# Patient Record
Sex: FEMALE | Race: WHITE | NOT HISPANIC OR LATINO | Employment: OTHER | URBAN - METROPOLITAN AREA
[De-identification: names, ages, dates, MRNs, and addresses within clinical notes are randomized per-mention and may not be internally consistent; named-entity substitution may affect disease eponyms.]

---

## 2023-02-22 LAB
ALANINE AMINOTRANSFERASE (SGPT) (U/L) IN SER/PLAS: 16 U/L (ref 7–45)
ALBUMIN (G/DL) IN SER/PLAS: 4.3 G/DL (ref 3.4–5)
ALKALINE PHOSPHATASE (U/L) IN SER/PLAS: 61 U/L (ref 33–136)
ANION GAP IN SER/PLAS: 12 MMOL/L (ref 10–20)
ASPARTATE AMINOTRANSFERASE (SGOT) (U/L) IN SER/PLAS: 19 U/L (ref 9–39)
BILIRUBIN TOTAL (MG/DL) IN SER/PLAS: 0.5 MG/DL (ref 0–1.2)
CALCIDIOL (25 OH VITAMIN D3) (NG/ML) IN SER/PLAS: 34 NG/ML
CALCIUM (MG/DL) IN SER/PLAS: 9.7 MG/DL (ref 8.6–10.3)
CARBON DIOXIDE, TOTAL (MMOL/L) IN SER/PLAS: 29 MMOL/L (ref 21–32)
CHLORIDE (MMOL/L) IN SER/PLAS: 102 MMOL/L (ref 98–107)
CHOLESTEROL (MG/DL) IN SER/PLAS: 139 MG/DL (ref 0–199)
CHOLESTEROL IN HDL (MG/DL) IN SER/PLAS: 53.8 MG/DL
CHOLESTEROL/HDL RATIO: 2.6
CREATININE (MG/DL) IN SER/PLAS: 0.81 MG/DL (ref 0.5–1.05)
CREATININE (MG/DL) IN URINE: 72 MG/DL (ref 20–320)
ERYTHROCYTE DISTRIBUTION WIDTH (RATIO) BY AUTOMATED COUNT: 12.9 % (ref 11.5–14.5)
ERYTHROCYTE MEAN CORPUSCULAR HEMOGLOBIN CONCENTRATION (G/DL) BY AUTOMATED: 32.1 G/DL (ref 32–36)
ERYTHROCYTE MEAN CORPUSCULAR VOLUME (FL) BY AUTOMATED COUNT: 87 FL (ref 80–100)
ERYTHROCYTES (10*6/UL) IN BLOOD BY AUTOMATED COUNT: 4.46 X10E12/L (ref 4–5.2)
ESTIMATED AVERAGE GLUCOSE FOR HBA1C: 189 MG/DL
GFR FEMALE: 75 ML/MIN/1.73M2
GLUCOSE (MG/DL) IN SER/PLAS: 165 MG/DL (ref 74–99)
HEMATOCRIT (%) IN BLOOD BY AUTOMATED COUNT: 39 % (ref 36–46)
HEMOGLOBIN (G/DL) IN BLOOD: 12.5 G/DL (ref 12–16)
HEMOGLOBIN A1C/HEMOGLOBIN TOTAL IN BLOOD: 8.2 %
LDL: 44 MG/DL (ref 0–99)
LEUKOCYTES (10*3/UL) IN BLOOD BY AUTOMATED COUNT: 7.2 X10E9/L (ref 4.4–11.3)
NON HDL CHOLESTEROL: 85 MG/DL
PLATELETS (10*3/UL) IN BLOOD AUTOMATED COUNT: 289 X10E9/L (ref 150–450)
POTASSIUM (MMOL/L) IN SER/PLAS: 4.3 MMOL/L (ref 3.5–5.3)
PROTEIN (MG/DL) IN URINE: 20 MG/DL (ref 5–24)
PROTEIN TOTAL: 6.6 G/DL (ref 6.4–8.2)
PROTEIN/CREATININE (MG/MG) IN URINE: 0.28 MG/MG CREAT (ref 0–0.17)
SODIUM (MMOL/L) IN SER/PLAS: 139 MMOL/L (ref 136–145)
TRIGLYCERIDE (MG/DL) IN SER/PLAS: 204 MG/DL (ref 0–149)
UREA NITROGEN (MG/DL) IN SER/PLAS: 20 MG/DL (ref 6–23)
VLDL: 41 MG/DL (ref 0–40)

## 2023-04-04 PROBLEM — M67.951 TENDINOPATHY OF RIGHT GLUTEUS MEDIUS: Status: ACTIVE | Noted: 2023-04-04

## 2023-04-04 PROBLEM — M81.0 OSTEOPOROSIS, POST-MENOPAUSAL: Status: ACTIVE | Noted: 2023-04-04

## 2023-04-04 PROBLEM — J22 LOWER RESPIRATORY INFECTION (E.G., BRONCHITIS, PNEUMONIA, PNEUMONITIS, PULMONITIS): Status: ACTIVE | Noted: 2023-04-04

## 2023-04-04 PROBLEM — R10.32 ABDOMINAL PAIN, ACUTE, LEFT LOWER QUADRANT: Status: RESOLVED | Noted: 2023-04-04 | Resolved: 2023-04-04

## 2023-04-04 PROBLEM — H52.13 MYOPIA OF BOTH EYES: Status: ACTIVE | Noted: 2023-04-04

## 2023-04-04 PROBLEM — R42 VERTIGO: Status: ACTIVE | Noted: 2023-04-04

## 2023-04-04 PROBLEM — M54.50 LOW BACK PAIN: Status: ACTIVE | Noted: 2023-04-04

## 2023-04-04 PROBLEM — M17.0 OSTEOARTHRITIS OF BOTH KNEES: Status: ACTIVE | Noted: 2023-04-04

## 2023-04-04 PROBLEM — M25.532 LEFT WRIST PAIN: Status: ACTIVE | Noted: 2023-04-04

## 2023-04-04 PROBLEM — M65.342 TRIGGER RING FINGER OF LEFT HAND: Status: ACTIVE | Noted: 2023-04-04

## 2023-04-04 PROBLEM — H93.12 TINNITUS OF LEFT EAR: Status: ACTIVE | Noted: 2023-04-04

## 2023-04-04 PROBLEM — R35.0 INCREASED URINARY FREQUENCY: Status: ACTIVE | Noted: 2023-04-04

## 2023-04-04 PROBLEM — S89.91XA INJURY OF RIGHT KNEE: Status: ACTIVE | Noted: 2023-04-04

## 2023-04-04 PROBLEM — M65.839 INTERSECTION SYNDROME OF WRIST: Status: ACTIVE | Noted: 2023-04-04

## 2023-04-04 PROBLEM — M19.042 OSTEOARTHRITIS OF HANDS, BILATERAL: Status: ACTIVE | Noted: 2023-04-04

## 2023-04-04 PROBLEM — M47.816 SPONDYLOSIS OF LUMBAR SPINE: Status: ACTIVE | Noted: 2023-04-04

## 2023-04-04 PROBLEM — M15.9 GENERALIZED OSTEOARTHRITIS OF MULTIPLE SITES: Status: ACTIVE | Noted: 2023-04-04

## 2023-04-04 PROBLEM — M19.041 OSTEOARTHRITIS OF HANDS, BILATERAL: Status: ACTIVE | Noted: 2023-04-04

## 2023-04-04 PROBLEM — G56.00 CARPAL TUNNEL SYNDROME: Status: ACTIVE | Noted: 2023-04-04

## 2023-04-04 PROBLEM — N95.2 ATROPHIC VAGINITIS: Status: ACTIVE | Noted: 2023-04-04

## 2023-04-04 PROBLEM — I10 HYPERTENSION: Status: ACTIVE | Noted: 2023-04-04

## 2023-04-04 PROBLEM — J32.9 SINUSITIS: Status: RESOLVED | Noted: 2023-04-04 | Resolved: 2023-04-04

## 2023-04-04 PROBLEM — K11.8 MASS OF RIGHT PAROTID GLAND: Status: ACTIVE | Noted: 2023-04-04

## 2023-04-04 PROBLEM — E55.9 VITAMIN D DEFICIENCY: Status: ACTIVE | Noted: 2023-04-04

## 2023-04-04 PROBLEM — H81.10 BENIGN PAROXYSMAL POSITIONAL VERTIGO: Status: ACTIVE | Noted: 2023-04-04

## 2023-04-04 PROBLEM — E78.2 HYPERLIPIDEMIA, MIXED: Status: ACTIVE | Noted: 2023-04-04

## 2023-04-04 PROBLEM — M46.1 SACROILIITIS (CMS-HCC): Status: ACTIVE | Noted: 2023-04-04

## 2023-04-04 PROBLEM — N39.0 RECURRENT UTI: Status: RESOLVED | Noted: 2023-04-04 | Resolved: 2023-04-04

## 2023-04-04 PROBLEM — H69.93 EUSTACHIAN TUBE DYSFUNCTION, BILATERAL: Status: ACTIVE | Noted: 2023-04-04

## 2023-04-04 PROBLEM — H90.3 BILATERAL SENSORINEURAL HEARING LOSS: Status: ACTIVE | Noted: 2023-04-04

## 2023-04-04 PROBLEM — D31.31 CHOROIDAL NEVUS OF RIGHT EYE: Status: ACTIVE | Noted: 2023-04-04

## 2023-04-04 PROBLEM — R59.1 LYMPHADENOPATHY: Status: ACTIVE | Noted: 2023-04-04

## 2023-04-04 PROBLEM — E11.9 TYPE 2 DIABETES MELLITUS WITHOUT COMPLICATIONS (MULTI): Status: ACTIVE | Noted: 2023-04-04

## 2023-04-04 PROBLEM — R25.2 LEG CRAMPS: Status: ACTIVE | Noted: 2023-04-04

## 2023-04-04 PROBLEM — H52.03 HYPERMETROPIA OF BOTH EYES: Status: ACTIVE | Noted: 2023-04-04

## 2023-04-04 PROBLEM — M25.542 ARTHRALGIA OF LEFT HAND: Status: ACTIVE | Noted: 2023-04-04

## 2023-04-04 PROBLEM — M25.551 HIP PAIN, RIGHT: Status: ACTIVE | Noted: 2023-04-04

## 2023-04-04 PROBLEM — R82.90 ABNORMAL FINDING ON URINALYSIS: Status: ACTIVE | Noted: 2023-04-04

## 2023-04-04 PROBLEM — M25.562 ARTHRALGIA OF LEFT KNEE: Status: ACTIVE | Noted: 2023-04-04

## 2023-04-04 PROBLEM — K57.32 DIVERTICULITIS OF SIGMOID COLON: Status: ACTIVE | Noted: 2023-04-04

## 2023-04-04 PROBLEM — R22.0 MASS OF SUBMANDIBULAR REGION: Status: ACTIVE | Noted: 2023-04-04

## 2023-04-04 PROBLEM — E11.9 DIABETES MELLITUS (MULTI): Status: ACTIVE | Noted: 2023-04-04

## 2023-04-04 PROBLEM — R39.9 URINARY TRACT INFECTION SYMPTOMS: Status: RESOLVED | Noted: 2023-04-04 | Resolved: 2023-04-04

## 2023-04-04 PROBLEM — R00.2 PALPITATIONS: Status: ACTIVE | Noted: 2023-04-04

## 2023-04-04 PROBLEM — M25.50 ARTHRALGIA: Status: ACTIVE | Noted: 2023-04-04

## 2023-04-04 RX ORDER — METFORMIN HYDROCHLORIDE 1000 MG/1
TABLET ORAL 2 TIMES DAILY
COMMUNITY
End: 2023-09-01 | Stop reason: WASHOUT

## 2023-04-04 RX ORDER — ACETAMINOPHEN 500 MG
TABLET ORAL DAILY
COMMUNITY
Start: 2021-03-29

## 2023-04-04 RX ORDER — ASPIRIN 81 MG/1
1 TABLET ORAL DAILY
COMMUNITY
Start: 2016-05-03

## 2023-04-04 RX ORDER — METFORMIN HYDROCHLORIDE 500 MG/1
2 TABLET, EXTENDED RELEASE ORAL 2 TIMES DAILY
COMMUNITY
Start: 2022-02-25 | End: 2023-08-21

## 2023-04-04 RX ORDER — BLOOD SUGAR DIAGNOSTIC
STRIP MISCELLANEOUS
COMMUNITY
Start: 2021-02-05 | End: 2023-11-30 | Stop reason: ENTERED-IN-ERROR

## 2023-04-04 RX ORDER — BISOPROLOL FUMARATE AND HYDROCHLOROTHIAZIDE 10; 6.25 MG/1; MG/1
TABLET ORAL
COMMUNITY
Start: 2015-12-07 | End: 2023-05-04

## 2023-04-04 RX ORDER — VITAMIN B COMPLEX
1 CAPSULE ORAL DAILY
COMMUNITY
Start: 2018-05-11

## 2023-04-04 RX ORDER — ROSUVASTATIN CALCIUM 20 MG/1
1 TABLET, COATED ORAL DAILY
COMMUNITY
Start: 2021-03-29 | End: 2024-02-19

## 2023-04-04 RX ORDER — LISINOPRIL 40 MG/1
1 TABLET ORAL DAILY
COMMUNITY
Start: 2016-05-03 | End: 2023-05-04

## 2023-04-04 RX ORDER — ESTRADIOL 0.1 MG/G
CREAM VAGINAL DAILY
COMMUNITY
Start: 2021-11-09

## 2023-04-11 PROBLEM — M54.51 VERTEBROGENIC LOW BACK PAIN: Status: ACTIVE | Noted: 2023-04-11

## 2023-04-11 RX ORDER — EMPAGLIFLOZIN 10 MG/1
1 TABLET, FILM COATED ORAL DAILY
COMMUNITY
Start: 2023-04-03 | End: 2023-09-01 | Stop reason: SDUPTHER

## 2023-04-11 RX ORDER — IBUPROFEN 200 MG
200 TABLET ORAL
COMMUNITY
Start: 2023-04-03 | End: 2024-01-04 | Stop reason: WASHOUT

## 2023-04-11 RX ORDER — AMLODIPINE BESYLATE 5 MG/1
0.5 TABLET ORAL DAILY
COMMUNITY
Start: 2023-04-03 | End: 2023-09-01 | Stop reason: ALTCHOICE

## 2023-05-02 DIAGNOSIS — I10 PRIMARY HYPERTENSION: Primary | ICD-10-CM

## 2023-05-04 RX ORDER — BISOPROLOL FUMARATE AND HYDROCHLOROTHIAZIDE 10; 6.25 MG/1; MG/1
TABLET ORAL
Qty: 150 TABLET | Refills: 2 | Status: SHIPPED | OUTPATIENT
Start: 2023-05-04 | End: 2023-09-01 | Stop reason: SDUPTHER

## 2023-05-04 RX ORDER — LISINOPRIL 40 MG/1
TABLET ORAL
Qty: 100 TABLET | Refills: 2 | Status: SHIPPED | OUTPATIENT
Start: 2023-05-04 | End: 2023-09-01 | Stop reason: SDUPTHER

## 2023-08-19 DIAGNOSIS — E11.9 TYPE 2 DIABETES MELLITUS WITHOUT COMPLICATION, WITHOUT LONG-TERM CURRENT USE OF INSULIN (MULTI): Primary | ICD-10-CM

## 2023-08-21 RX ORDER — METFORMIN HYDROCHLORIDE 500 MG/1
1000 TABLET, EXTENDED RELEASE ORAL 2 TIMES DAILY
Qty: 400 TABLET | Refills: 2 | Status: SHIPPED | OUTPATIENT
Start: 2023-08-21

## 2023-08-21 RX ORDER — SULFAMETHOXAZOLE AND TRIMETHOPRIM 400; 80 MG/1; MG/1
1 TABLET ORAL EVERY OTHER DAY
COMMUNITY
Start: 2023-06-27 | End: 2023-10-20 | Stop reason: SDUPTHER

## 2023-08-22 ENCOUNTER — APPOINTMENT (OUTPATIENT)
Dept: PRIMARY CARE | Facility: CLINIC | Age: 78
End: 2023-08-22
Payer: MEDICARE

## 2023-08-25 ENCOUNTER — APPOINTMENT (OUTPATIENT)
Dept: PRIMARY CARE | Facility: CLINIC | Age: 78
End: 2023-08-25
Payer: MEDICARE

## 2023-09-01 ENCOUNTER — OFFICE VISIT (OUTPATIENT)
Dept: PRIMARY CARE | Facility: CLINIC | Age: 78
End: 2023-09-01
Payer: MEDICARE

## 2023-09-01 ENCOUNTER — LAB (OUTPATIENT)
Dept: LAB | Facility: LAB | Age: 78
End: 2023-09-01
Payer: MEDICARE

## 2023-09-01 VITALS
BODY MASS INDEX: 27.35 KG/M2 | WEIGHT: 160.2 LBS | HEIGHT: 64 IN | HEART RATE: 55 BPM | SYSTOLIC BLOOD PRESSURE: 110 MMHG | TEMPERATURE: 97.3 F | OXYGEN SATURATION: 94 % | DIASTOLIC BLOOD PRESSURE: 70 MMHG

## 2023-09-01 DIAGNOSIS — E11.9 TYPE 2 DIABETES MELLITUS WITHOUT COMPLICATION, WITHOUT LONG-TERM CURRENT USE OF INSULIN (MULTI): ICD-10-CM

## 2023-09-01 DIAGNOSIS — M46.1 SACROILIITIS (CMS-HCC): ICD-10-CM

## 2023-09-01 DIAGNOSIS — I10 PRIMARY HYPERTENSION: ICD-10-CM

## 2023-09-01 DIAGNOSIS — E55.9 VITAMIN D DEFICIENCY: ICD-10-CM

## 2023-09-01 DIAGNOSIS — Z00.00 ROUTINE GENERAL MEDICAL EXAMINATION AT HEALTH CARE FACILITY: ICD-10-CM

## 2023-09-01 DIAGNOSIS — Z78.0 ASYMPTOMATIC MENOPAUSAL STATE: ICD-10-CM

## 2023-09-01 DIAGNOSIS — Z00.00 ROUTINE GENERAL MEDICAL EXAMINATION AT HEALTH CARE FACILITY: Primary | ICD-10-CM

## 2023-09-01 PROBLEM — R82.90 ABNORMAL FINDING ON URINALYSIS: Status: RESOLVED | Noted: 2023-04-04 | Resolved: 2023-09-01

## 2023-09-01 PROBLEM — M25.532 LEFT WRIST PAIN: Status: RESOLVED | Noted: 2023-04-04 | Resolved: 2023-09-01

## 2023-09-01 PROBLEM — M25.542 ARTHRALGIA OF LEFT HAND: Status: RESOLVED | Noted: 2023-04-04 | Resolved: 2023-09-01

## 2023-09-01 PROBLEM — M25.562 ARTHRALGIA OF LEFT KNEE: Status: RESOLVED | Noted: 2023-04-04 | Resolved: 2023-09-01

## 2023-09-01 PROBLEM — M25.50 ARTHRALGIA: Status: RESOLVED | Noted: 2023-04-04 | Resolved: 2023-09-01

## 2023-09-01 PROCEDURE — 1170F FXNL STATUS ASSESSED: CPT | Performed by: INTERNAL MEDICINE

## 2023-09-01 PROCEDURE — 80053 COMPREHEN METABOLIC PANEL: CPT

## 2023-09-01 PROCEDURE — 83036 HEMOGLOBIN GLYCOSYLATED A1C: CPT

## 2023-09-01 PROCEDURE — 1159F MED LIST DOCD IN RCRD: CPT | Performed by: INTERNAL MEDICINE

## 2023-09-01 PROCEDURE — 3074F SYST BP LT 130 MM HG: CPT | Performed by: INTERNAL MEDICINE

## 2023-09-01 PROCEDURE — 1126F AMNT PAIN NOTED NONE PRSNT: CPT | Performed by: INTERNAL MEDICINE

## 2023-09-01 PROCEDURE — G0439 PPPS, SUBSEQ VISIT: HCPCS | Performed by: INTERNAL MEDICINE

## 2023-09-01 PROCEDURE — 86803 HEPATITIS C AB TEST: CPT

## 2023-09-01 PROCEDURE — 82306 VITAMIN D 25 HYDROXY: CPT

## 2023-09-01 PROCEDURE — 1036F TOBACCO NON-USER: CPT | Performed by: INTERNAL MEDICINE

## 2023-09-01 PROCEDURE — 36415 COLL VENOUS BLD VENIPUNCTURE: CPT

## 2023-09-01 PROCEDURE — 3078F DIAST BP <80 MM HG: CPT | Performed by: INTERNAL MEDICINE

## 2023-09-01 PROCEDURE — 1160F RVW MEDS BY RX/DR IN RCRD: CPT | Performed by: INTERNAL MEDICINE

## 2023-09-01 PROCEDURE — 82570 ASSAY OF URINE CREATININE: CPT

## 2023-09-01 PROCEDURE — 82043 UR ALBUMIN QUANTITATIVE: CPT

## 2023-09-01 RX ORDER — EMPAGLIFLOZIN 10 MG/1
10 TABLET, FILM COATED ORAL DAILY
Qty: 90 TABLET | Refills: 1 | Status: SHIPPED | OUTPATIENT
Start: 2023-11-01 | End: 2023-11-29

## 2023-09-01 RX ORDER — LISINOPRIL 40 MG/1
40 TABLET ORAL DAILY
Qty: 90 TABLET | Refills: 1 | Status: SHIPPED | OUTPATIENT
Start: 2023-11-01 | End: 2024-03-07 | Stop reason: SDUPTHER

## 2023-09-01 RX ORDER — AMLODIPINE BESYLATE 2.5 MG/1
2.5 TABLET ORAL DAILY
Qty: 90 TABLET | Refills: 1 | Status: SHIPPED | OUTPATIENT
Start: 2023-11-01 | End: 2024-03-07 | Stop reason: SDUPTHER

## 2023-09-01 RX ORDER — BISOPROLOL FUMARATE AND HYDROCHLOROTHIAZIDE 10; 6.25 MG/1; MG/1
TABLET ORAL
Qty: 150 TABLET | Refills: 2 | Status: SHIPPED | OUTPATIENT
Start: 2023-11-01

## 2023-09-01 ASSESSMENT — ACTIVITIES OF DAILY LIVING (ADL)
JUDGMENT_ADEQUATE_SAFELY_COMPLETE_DAILY_ACTIVITIES: YES
BATHING: INDEPENDENT
DOING_HOUSEWORK: INDEPENDENT
ADEQUATE_TO_COMPLETE_ADL: YES
GROCERY_SHOPPING: INDEPENDENT
DRESSING: INDEPENDENT
TOILETING: INDEPENDENT
PATIENT'S MEMORY ADEQUATE TO SAFELY COMPLETE DAILY ACTIVITIES?: YES
GROOMING: INDEPENDENT
FEEDING YOURSELF: INDEPENDENT
TAKING_MEDICATION: INDEPENDENT
MANAGING_FINANCES: INDEPENDENT

## 2023-09-01 ASSESSMENT — ENCOUNTER SYMPTOMS
LOSS OF SENSATION IN FEET: 0
DEPRESSION: 0
OCCASIONAL FEELINGS OF UNSTEADINESS: 0

## 2023-09-01 ASSESSMENT — PATIENT HEALTH QUESTIONNAIRE - PHQ9
2. FEELING DOWN, DEPRESSED OR HOPELESS: NOT AT ALL
1. LITTLE INTEREST OR PLEASURE IN DOING THINGS: NOT AT ALL
SUM OF ALL RESPONSES TO PHQ9 QUESTIONS 1 AND 2: 0

## 2023-09-01 ASSESSMENT — PAIN SCALES - GENERAL: PAINLEVEL: 0-NO PAIN

## 2023-09-01 NOTE — PROGRESS NOTES
Subjective   Reason for Visit: Tanya Fan is an 78 y.o. female here for a Medicare Wellness visit.     Past Medical, Surgical, and Family History reviewed and updated in chart.    Reviewed all medications by prescribing practitioner or clinical pharmacist (such as prescriptions, OTCs, herbal therapies and supplements) and documented in the medical record.    HPI  Patient presented to the office for Medicare wellness visit.    Patient Care Team:  Marquise Garcia MD as PCP - General  Marquise Garcia MD as PCP - [employer] PCP     Review of Systems   Constitutional:  Negative for activity change, appetite change, fatigue, fever and unexpected weight change.   HENT:  Negative for dental problem, ear discharge, hearing loss, nosebleeds, postnasal drip, sinus pain, sore throat, trouble swallowing and voice change.    Eyes:  Negative for photophobia, pain and visual disturbance.   Respiratory:  Negative for cough, chest tightness, shortness of breath, wheezing and stridor.    Cardiovascular:  Negative for chest pain, palpitations and leg swelling.   Gastrointestinal:  Negative for abdominal pain, blood in stool, constipation, diarrhea, nausea and vomiting.   Endocrine: Negative for polydipsia, polyphagia and polyuria.   Genitourinary:  Negative for decreased urine volume, dyspareunia, dysuria, flank pain, frequency, hematuria and urgency.   Musculoskeletal:  Negative for arthralgias, back pain, gait problem, joint swelling, myalgias and neck pain.   Skin:  Negative for color change, rash and wound.   Allergic/Immunologic: Negative for environmental allergies and food allergies.   Neurological:  Negative for dizziness, tremors, seizures, syncope, facial asymmetry, speech difficulty, weakness, light-headedness, numbness and headaches.   Hematological:  Negative for adenopathy.   Psychiatric/Behavioral:  Negative for behavioral problems, confusion, hallucinations, self-injury, sleep disturbance and suicidal ideas.  "The patient is not nervous/anxious.      Objective   Vitals:  /70   Pulse 55   Temp 36.3 °C (97.3 °F)   Ht 1.626 m (5' 4\")   Wt 72.7 kg (160 lb 3.2 oz)   SpO2 94%   BMI 27.50 kg/m²       Physical Exam  Constitutional:       General: She is not in acute distress.     Appearance: Normal appearance. She is not ill-appearing or toxic-appearing.   HENT:      Head: Normocephalic and atraumatic.      Nose: Nose normal.   Eyes:      General:         Right eye: No discharge.         Left eye: No discharge.      Extraocular Movements: Extraocular movements intact.      Conjunctiva/sclera: Conjunctivae normal.      Pupils: Pupils are equal, round, and reactive to light.   Cardiovascular:      Rate and Rhythm: Normal rate and regular rhythm.      Pulses: Normal pulses.      Heart sounds: Normal heart sounds. No murmur heard.     No gallop.   Pulmonary:      Effort: Pulmonary effort is normal. No respiratory distress.      Breath sounds: Normal breath sounds. No stridor. No wheezing or rales.   Abdominal:      General: Bowel sounds are normal. There is no distension.      Palpations: Abdomen is soft.      Tenderness: There is no abdominal tenderness. There is no right CVA tenderness or left CVA tenderness.   Musculoskeletal:         General: No swelling or deformity. Normal range of motion.      Cervical back: Normal range of motion and neck supple. No rigidity or tenderness.      Right lower leg: No edema.      Left lower leg: No edema.   Skin:     General: Skin is warm.      Coloration: Skin is not jaundiced.      Findings: No bruising, erythema or rash.   Neurological:      General: No focal deficit present.      Mental Status: She is alert and oriented to person, place, and time.      Cranial Nerves: No cranial nerve deficit.      Gait: Gait normal.   Psychiatric:         Mood and Affect: Mood normal.         Behavior: Behavior normal.         Thought Content: Thought content normal.         Judgment: Judgment " normal.       Assessment/Plan   Problem List Items Addressed This Visit          Cardiac and Vasculature    Hypertension    Relevant Medications    amLODIPine (Norvasc) 2.5 mg tablet (Start on 11/1/2023)    bisoproloL-hydrochlorothiazide (Ziac) 10-6.25 mg tablet (Start on 11/1/2023)    lisinopril 40 mg tablet (Start on 11/1/2023)       Endocrine/Metabolic    Type 2 diabetes mellitus without complication (CMS/HCC)    Relevant Medications    Jardiance 10 mg (Start on 11/1/2023)    Other Relevant Orders    Hemoglobin A1C    Comprehensive Metabolic Panel    Albumin, urine, random    Vitamin D deficiency    Relevant Orders    Vitamin D 25-Hydroxy,Total (for eval of Vitamin D levels)     Other Visit Diagnoses       Routine general medical examination at health care facility    -  Primary    Relevant Orders    Hepatitis C Antibody    Asymptomatic menopausal state        Relevant Orders    XR DEXA bone density

## 2023-09-02 LAB
ALANINE AMINOTRANSFERASE (SGPT) (U/L) IN SER/PLAS: 16 U/L (ref 7–45)
ALBUMIN (G/DL) IN SER/PLAS: 4.4 G/DL (ref 3.4–5)
ALBUMIN (MG/L) IN URINE: 9 MG/L
ALBUMIN/CREATININE (UG/MG) IN URINE: 14.4 UG/MG CRT (ref 0–30)
ALKALINE PHOSPHATASE (U/L) IN SER/PLAS: 69 U/L (ref 33–136)
ANION GAP IN SER/PLAS: 14 MMOL/L (ref 10–20)
ASPARTATE AMINOTRANSFERASE (SGOT) (U/L) IN SER/PLAS: 17 U/L (ref 9–39)
BILIRUBIN TOTAL (MG/DL) IN SER/PLAS: 0.4 MG/DL (ref 0–1.2)
CALCIDIOL (25 OH VITAMIN D3) (NG/ML) IN SER/PLAS: 45 NG/ML
CALCIUM (MG/DL) IN SER/PLAS: 10.3 MG/DL (ref 8.6–10.6)
CARBON DIOXIDE, TOTAL (MMOL/L) IN SER/PLAS: 26 MMOL/L (ref 21–32)
CHLORIDE (MMOL/L) IN SER/PLAS: 106 MMOL/L (ref 98–107)
CREATININE (MG/DL) IN SER/PLAS: 0.89 MG/DL (ref 0.5–1.05)
CREATININE (MG/DL) IN URINE: 62.3 MG/DL (ref 20–320)
ESTIMATED AVERAGE GLUCOSE FOR HBA1C: 169 MG/DL
GFR FEMALE: 67 ML/MIN/1.73M2
GLUCOSE (MG/DL) IN SER/PLAS: 169 MG/DL (ref 74–99)
HEMOGLOBIN A1C/HEMOGLOBIN TOTAL IN BLOOD: 7.5 %
HEPATITIS C VIRUS AB PRESENCE IN SERUM: NONREACTIVE
POTASSIUM (MMOL/L) IN SER/PLAS: 4.3 MMOL/L (ref 3.5–5.3)
PROTEIN TOTAL: 7 G/DL (ref 6.4–8.2)
SODIUM (MMOL/L) IN SER/PLAS: 142 MMOL/L (ref 136–145)
UREA NITROGEN (MG/DL) IN SER/PLAS: 20 MG/DL (ref 6–23)

## 2023-09-03 ASSESSMENT — ENCOUNTER SYMPTOMS
VOICE CHANGE: 0
ADENOPATHY: 0
TREMORS: 0
EYE PAIN: 0
WOUND: 0
DYSURIA: 0
SEIZURES: 0
SORE THROAT: 0
VOMITING: 0
NAUSEA: 0
ARTHRALGIAS: 0
SPEECH DIFFICULTY: 0
TROUBLE SWALLOWING: 0
NUMBNESS: 0
FREQUENCY: 0
HALLUCINATIONS: 0
SHORTNESS OF BREATH: 0
FEVER: 0
ACTIVITY CHANGE: 0
UNEXPECTED WEIGHT CHANGE: 0
FLANK PAIN: 0
SLEEP DISTURBANCE: 0
WEAKNESS: 0
DIARRHEA: 0
POLYPHAGIA: 0
CHEST TIGHTNESS: 0
DIZZINESS: 0
MYALGIAS: 0
BLOOD IN STOOL: 0
FACIAL ASYMMETRY: 0
BACK PAIN: 0
CONSTIPATION: 0
APPETITE CHANGE: 0
LIGHT-HEADEDNESS: 0
FATIGUE: 0
POLYDIPSIA: 0
HEMATURIA: 0
NERVOUS/ANXIOUS: 0
COUGH: 0
CONFUSION: 0
COLOR CHANGE: 0
NECK PAIN: 0
JOINT SWELLING: 0
HEADACHES: 0
PHOTOPHOBIA: 0
STRIDOR: 0
PALPITATIONS: 0
WHEEZING: 0
SINUS PAIN: 0
ABDOMINAL PAIN: 0

## 2023-09-20 PROBLEM — H52.4 MYOPIA OF BOTH EYES WITH REGULAR ASTIGMATISM AND PRESBYOPIA: Status: ACTIVE | Noted: 2023-04-04

## 2023-09-20 PROBLEM — H52.223 MYOPIA OF BOTH EYES WITH REGULAR ASTIGMATISM AND PRESBYOPIA: Status: ACTIVE | Noted: 2023-04-04

## 2023-09-20 PROBLEM — E66.3 OVERWEIGHT WITH BODY MASS INDEX (BMI) OF 27 TO 27.9 IN ADULT: Status: ACTIVE | Noted: 2023-09-20

## 2023-09-20 PROBLEM — M47.816: Status: ACTIVE | Noted: 2023-09-20

## 2023-09-20 RX ORDER — LANCETS 33 GAUGE
EACH MISCELLANEOUS DAILY
COMMUNITY
End: 2023-11-30 | Stop reason: ENTERED-IN-ERROR

## 2023-09-20 RX ORDER — AMLODIPINE BESYLATE 5 MG/1
5 TABLET ORAL DAILY
COMMUNITY
End: 2023-11-30 | Stop reason: ENTERED-IN-ERROR

## 2023-09-20 RX ORDER — INSULIN PUMP SYRINGE, 3 ML
EACH MISCELLANEOUS
COMMUNITY
End: 2023-11-30 | Stop reason: ENTERED-IN-ERROR

## 2023-10-20 ENCOUNTER — TELEMEDICINE (OUTPATIENT)
Dept: INFECTIOUS DISEASES | Facility: CLINIC | Age: 78
End: 2023-10-20
Payer: MEDICARE

## 2023-10-20 DIAGNOSIS — Z87.440 HISTORY OF RECURRENT UTIS: Primary | ICD-10-CM

## 2023-10-20 DIAGNOSIS — N30.00 ACUTE CYSTITIS WITHOUT HEMATURIA: ICD-10-CM

## 2023-10-20 PROCEDURE — 99214 OFFICE O/P EST MOD 30 MIN: CPT | Performed by: INTERNAL MEDICINE

## 2023-10-20 RX ORDER — SULFAMETHOXAZOLE AND TRIMETHOPRIM 400; 80 MG/1; MG/1
1 TABLET ORAL EVERY OTHER DAY
Qty: 45 TABLET | Refills: 3 | Status: SHIPPED | OUTPATIENT
Start: 2023-10-20 | End: 2024-01-18

## 2023-10-20 NOTE — PROGRESS NOTES
Prior to seeing the patient we reviewed all the data in the system since our last visit in April.  No significant events.  Patient's had no symptomatic UTIs since her last visit.  She continues to use topical estrogen with increased fluids and continues to take a single strength Bactrim every other day.  No complaints whatsoever and very happy with her current regimen status in the absence of UTIs.      Virtual visit doing well      Assessment and plan.  Patient very happy with current strategy.  Continue fluids and estrogen.  I always tell patients I have some reservations about chronic suppressive antibiotics but in her case has been working for a long time with no symptomatic UTIs and will continue.  We ordered a standing urine culture result change should she get symptoms and we refilled her Bactrim.  We will do a follow-up visit April 5 at 820 virtual

## 2023-11-25 DIAGNOSIS — E11.9 TYPE 2 DIABETES MELLITUS WITHOUT COMPLICATION, WITHOUT LONG-TERM CURRENT USE OF INSULIN (MULTI): ICD-10-CM

## 2023-11-28 ENCOUNTER — TELEMEDICINE (OUTPATIENT)
Dept: PRIMARY CARE | Facility: CLINIC | Age: 78
End: 2023-11-28
Payer: MEDICARE

## 2023-11-28 VITALS — HEIGHT: 64 IN | WEIGHT: 160 LBS | BODY MASS INDEX: 27.31 KG/M2

## 2023-11-28 DIAGNOSIS — K11.20 SIALADENITIS: Primary | ICD-10-CM

## 2023-11-28 PROCEDURE — 99213 OFFICE O/P EST LOW 20 MIN: CPT | Performed by: FAMILY MEDICINE

## 2023-11-28 RX ORDER — CLINDAMYCIN HYDROCHLORIDE 300 MG/1
300 CAPSULE ORAL 3 TIMES DAILY
Qty: 30 CAPSULE | Refills: 0 | Status: SHIPPED | OUTPATIENT
Start: 2023-11-28 | End: 2023-12-08

## 2023-11-28 RX ORDER — PREDNISONE 20 MG/1
TABLET ORAL
COMMUNITY
Start: 2023-11-25 | End: 2023-11-30 | Stop reason: ENTERED-IN-ERROR

## 2023-11-28 ASSESSMENT — ENCOUNTER SYMPTOMS
SORE THROAT: 0
DIARRHEA: 0
FATIGUE: 0
NAUSEA: 0
FREQUENCY: 0
WEAKNESS: 0
VOMITING: 0
ABDOMINAL PAIN: 0
SHORTNESS OF BREATH: 0
HEMATURIA: 0
HEADACHES: 0
EYE PAIN: 0
CONFUSION: 0
NECK PAIN: 1
BLOOD IN STOOL: 0
HALLUCINATIONS: 0
DECREASED CONCENTRATION: 0
UNEXPECTED WEIGHT CHANGE: 0
RHINORRHEA: 1
TROUBLE SWALLOWING: 0
FEVER: 0
NUMBNESS: 0
DIZZINESS: 0
JOINT SWELLING: 0
DYSURIA: 0
COUGH: 0
PALPITATIONS: 0

## 2023-11-28 ASSESSMENT — PAIN SCALES - GENERAL: PAINLEVEL: 10-WORST PAIN EVER

## 2023-11-28 NOTE — PROGRESS NOTES
Subjective   Patient ID: Tanya Fan is a 78 y.o. female.    Patient diagnosed with COVID about 4 days ago.  Those symptoms have pretty much resolved but she developed swelling and significant pain below the angle of the mandible on the left.  It is painful to touch and she does not necessarily have a sore throat with swallowing.  She has had sialadenitis before and had seen ENT and even had biopsies done which were unremarkable.  There is no fever or chills.  No respiratory distress.        Review of Systems   Constitutional:  Negative for fatigue, fever and unexpected weight change.   HENT:  Positive for rhinorrhea. Negative for congestion, ear pain, hearing loss, sore throat and trouble swallowing.    Eyes:  Negative for pain and visual disturbance.   Respiratory:  Negative for cough and shortness of breath.    Cardiovascular:  Negative for chest pain, palpitations and leg swelling.   Gastrointestinal:  Negative for abdominal pain, blood in stool, diarrhea, nausea and vomiting.   Genitourinary:  Negative for dysuria, frequency, hematuria and urgency.   Musculoskeletal:  Positive for neck pain. Negative for joint swelling.   Skin:  Negative for pallor and rash.   Neurological:  Negative for dizziness, syncope, weakness, numbness and headaches.   Psychiatric/Behavioral:  Negative for confusion, decreased concentration, hallucinations and suicidal ideas.      There were no vitals filed for this visit.   Objective   Physical Exam  Constitutional:       General: She is not in acute distress.     Appearance: Normal appearance. She is not toxic-appearing.      Comments: She does not sound congested and there is no cough   Neck:      Comments: There is fullness on the left neck at the angle of the mandible.  There is no erythema and does not appear to be any induration.  Neurological:      Mental Status: She is alert.         Assessment/Plan   There are no diagnoses linked to this encounter.

## 2023-11-28 NOTE — PATIENT INSTRUCTIONS
You may have another blocked salivary gland on the left side.  I am going to treat it with antibiotics and I would like you to drink lemonade, lemon water and or sour candies.  Heating pad and massage may help as well.  If it does not improve let me know.  I do not feel this is part of Covid but perhaps you got  dehydrated or low on fluids and the problem developed.

## 2023-11-29 ENCOUNTER — APPOINTMENT (OUTPATIENT)
Dept: RADIOLOGY | Facility: HOSPITAL | Age: 78
DRG: 156 | End: 2023-11-29
Payer: MEDICARE

## 2023-11-29 ENCOUNTER — APPOINTMENT (OUTPATIENT)
Dept: PRIMARY CARE | Facility: CLINIC | Age: 78
End: 2023-11-29
Payer: MEDICARE

## 2023-11-29 ENCOUNTER — HOSPITAL ENCOUNTER (INPATIENT)
Facility: HOSPITAL | Age: 78
LOS: 1 days | Discharge: HOME | DRG: 156 | End: 2023-12-03
Attending: INTERNAL MEDICINE | Admitting: INTERNAL MEDICINE
Payer: MEDICARE

## 2023-11-29 DIAGNOSIS — K11.21 PAROTITIS, ACUTE: Primary | ICD-10-CM

## 2023-11-29 DIAGNOSIS — K11.20 SIALOADENITIS: ICD-10-CM

## 2023-11-29 LAB
ALBUMIN SERPL BCP-MCNC: 4.2 G/DL (ref 3.4–5)
ALP SERPL-CCNC: 67 U/L (ref 33–136)
ALT SERPL W P-5'-P-CCNC: 15 U/L (ref 7–45)
ANION GAP SERPL CALC-SCNC: 14 MMOL/L (ref 10–20)
AST SERPL W P-5'-P-CCNC: 14 U/L (ref 9–39)
BASOPHILS # BLD AUTO: 0.05 X10*3/UL (ref 0–0.1)
BASOPHILS NFR BLD AUTO: 0.4 %
BILIRUB SERPL-MCNC: 0.4 MG/DL (ref 0–1.2)
BUN SERPL-MCNC: 23 MG/DL (ref 6–23)
CALCIUM SERPL-MCNC: 9.7 MG/DL (ref 8.6–10.3)
CHLORIDE SERPL-SCNC: 103 MMOL/L (ref 98–107)
CO2 SERPL-SCNC: 25 MMOL/L (ref 21–32)
CREAT SERPL-MCNC: 1.03 MG/DL (ref 0.5–1.05)
EOSINOPHIL # BLD AUTO: 0.07 X10*3/UL (ref 0–0.4)
EOSINOPHIL NFR BLD AUTO: 0.5 %
ERYTHROCYTE [DISTWIDTH] IN BLOOD BY AUTOMATED COUNT: 13.2 % (ref 11.5–14.5)
GFR SERPL CREATININE-BSD FRML MDRD: 56 ML/MIN/1.73M*2
GLUCOSE SERPL-MCNC: 185 MG/DL (ref 74–99)
HCT VFR BLD AUTO: 39.8 % (ref 36–46)
HGB BLD-MCNC: 12.7 G/DL (ref 12–16)
IMM GRANULOCYTES # BLD AUTO: 0.17 X10*3/UL (ref 0–0.5)
IMM GRANULOCYTES NFR BLD AUTO: 1.3 % (ref 0–0.9)
LACTATE SERPL-SCNC: 1.2 MMOL/L (ref 0.4–2)
LYMPHOCYTES # BLD AUTO: 2.9 X10*3/UL (ref 0.8–3)
LYMPHOCYTES NFR BLD AUTO: 22.4 %
MCH RBC QN AUTO: 28.2 PG (ref 26–34)
MCHC RBC AUTO-ENTMCNC: 31.9 G/DL (ref 32–36)
MCV RBC AUTO: 88 FL (ref 80–100)
MONOCYTES # BLD AUTO: 1.11 X10*3/UL (ref 0.05–0.8)
MONOCYTES NFR BLD AUTO: 8.6 %
NEUTROPHILS # BLD AUTO: 8.64 X10*3/UL (ref 1.6–5.5)
NEUTROPHILS NFR BLD AUTO: 66.8 %
NRBC BLD-RTO: 0 /100 WBCS (ref 0–0)
PLATELET # BLD AUTO: 310 X10*3/UL (ref 150–450)
POTASSIUM SERPL-SCNC: 4.1 MMOL/L (ref 3.5–5.3)
PROT SERPL-MCNC: 6.9 G/DL (ref 6.4–8.2)
RBC # BLD AUTO: 4.51 X10*6/UL (ref 4–5.2)
SODIUM SERPL-SCNC: 138 MMOL/L (ref 136–145)
WBC # BLD AUTO: 12.9 X10*3/UL (ref 4.4–11.3)

## 2023-11-29 PROCEDURE — 2550000001 HC RX 255 CONTRASTS

## 2023-11-29 PROCEDURE — 85025 COMPLETE CBC W/AUTO DIFF WBC: CPT

## 2023-11-29 PROCEDURE — 2500000004 HC RX 250 GENERAL PHARMACY W/ HCPCS (ALT 636 FOR OP/ED): Performed by: NURSE PRACTITIONER

## 2023-11-29 PROCEDURE — 2500000004 HC RX 250 GENERAL PHARMACY W/ HCPCS (ALT 636 FOR OP/ED)

## 2023-11-29 PROCEDURE — 96368 THER/DIAG CONCURRENT INF: CPT

## 2023-11-29 PROCEDURE — 99233 SBSQ HOSP IP/OBS HIGH 50: CPT | Performed by: NURSE PRACTITIONER

## 2023-11-29 PROCEDURE — 83605 ASSAY OF LACTIC ACID: CPT

## 2023-11-29 PROCEDURE — 2550000001 HC RX 255 CONTRASTS: Performed by: INTERNAL MEDICINE

## 2023-11-29 PROCEDURE — 70491 CT SOFT TISSUE NECK W/DYE: CPT | Performed by: STUDENT IN AN ORGANIZED HEALTH CARE EDUCATION/TRAINING PROGRAM

## 2023-11-29 PROCEDURE — 96365 THER/PROPH/DIAG IV INF INIT: CPT | Mod: 59

## 2023-11-29 PROCEDURE — 96375 TX/PRO/DX INJ NEW DRUG ADDON: CPT

## 2023-11-29 PROCEDURE — 96372 THER/PROPH/DIAG INJ SC/IM: CPT

## 2023-11-29 PROCEDURE — 80053 COMPREHEN METABOLIC PANEL: CPT

## 2023-11-29 PROCEDURE — G0378 HOSPITAL OBSERVATION PER HR: HCPCS

## 2023-11-29 PROCEDURE — 99285 EMERGENCY DEPT VISIT HI MDM: CPT | Mod: 25 | Performed by: INTERNAL MEDICINE

## 2023-11-29 PROCEDURE — 96366 THER/PROPH/DIAG IV INF ADDON: CPT

## 2023-11-29 PROCEDURE — 2500000001 HC RX 250 WO HCPCS SELF ADMINISTERED DRUGS (ALT 637 FOR MEDICARE OP)

## 2023-11-29 PROCEDURE — 96376 TX/PRO/DX INJ SAME DRUG ADON: CPT

## 2023-11-29 PROCEDURE — 70491 CT SOFT TISSUE NECK W/DYE: CPT

## 2023-11-29 PROCEDURE — 36415 COLL VENOUS BLD VENIPUNCTURE: CPT

## 2023-11-29 RX ORDER — ROSUVASTATIN CALCIUM 20 MG/1
20 TABLET, COATED ORAL DAILY
Status: DISCONTINUED | OUTPATIENT
Start: 2023-11-30 | End: 2023-12-03 | Stop reason: HOSPADM

## 2023-11-29 RX ORDER — POLYETHYLENE GLYCOL 3350 17 G/17G
17 POWDER, FOR SOLUTION ORAL DAILY
Status: DISCONTINUED | OUTPATIENT
Start: 2023-11-30 | End: 2023-12-03 | Stop reason: HOSPADM

## 2023-11-29 RX ORDER — METRONIDAZOLE 500 MG/100ML
500 INJECTION, SOLUTION INTRAVENOUS ONCE
Status: COMPLETED | OUTPATIENT
Start: 2023-11-29 | End: 2023-11-29

## 2023-11-29 RX ORDER — LISINOPRIL 20 MG/1
40 TABLET ORAL DAILY
Status: DISCONTINUED | OUTPATIENT
Start: 2023-11-30 | End: 2023-12-03 | Stop reason: HOSPADM

## 2023-11-29 RX ORDER — ASPIRIN 81 MG/1
81 TABLET ORAL DAILY
Status: DISCONTINUED | OUTPATIENT
Start: 2023-11-30 | End: 2023-12-03 | Stop reason: HOSPADM

## 2023-11-29 RX ORDER — CHOLECALCIFEROL (VITAMIN D3) 25 MCG
2000 TABLET ORAL DAILY
Status: DISCONTINUED | OUTPATIENT
Start: 2023-11-30 | End: 2023-12-03 | Stop reason: HOSPADM

## 2023-11-29 RX ORDER — CLINDAMYCIN HYDROCHLORIDE 150 MG/1
300 CAPSULE ORAL 3 TIMES DAILY
Status: DISCONTINUED | OUTPATIENT
Start: 2023-11-29 | End: 2023-11-29

## 2023-11-29 RX ORDER — ACETAMINOPHEN 160 MG/5ML
650 SOLUTION ORAL EVERY 4 HOURS PRN
Status: DISCONTINUED | OUTPATIENT
Start: 2023-11-29 | End: 2023-12-03 | Stop reason: HOSPADM

## 2023-11-29 RX ORDER — BISOPROLOL FUMARATE 5 MG/1
10 TABLET, FILM COATED ORAL DAILY
Status: DISCONTINUED | OUTPATIENT
Start: 2023-11-30 | End: 2023-12-03 | Stop reason: HOSPADM

## 2023-11-29 RX ORDER — ACETAMINOPHEN 325 MG/1
650 TABLET ORAL EVERY 4 HOURS PRN
Status: DISCONTINUED | OUTPATIENT
Start: 2023-11-29 | End: 2023-12-03 | Stop reason: HOSPADM

## 2023-11-29 RX ORDER — OXYCODONE HYDROCHLORIDE 5 MG/1
5 TABLET ORAL EVERY 6 HOURS PRN
Status: DISCONTINUED | OUTPATIENT
Start: 2023-11-29 | End: 2023-12-03 | Stop reason: HOSPADM

## 2023-11-29 RX ORDER — METRONIDAZOLE 500 MG/100ML
500 INJECTION, SOLUTION INTRAVENOUS EVERY 8 HOURS
Status: DISCONTINUED | OUTPATIENT
Start: 2023-11-30 | End: 2023-11-30

## 2023-11-29 RX ORDER — ONDANSETRON 4 MG/1
4 TABLET, FILM COATED ORAL EVERY 8 HOURS PRN
Status: DISCONTINUED | OUTPATIENT
Start: 2023-11-29 | End: 2023-12-03 | Stop reason: HOSPADM

## 2023-11-29 RX ORDER — AMLODIPINE BESYLATE 2.5 MG/1
2.5 TABLET ORAL DAILY
Status: DISCONTINUED | OUTPATIENT
Start: 2023-11-30 | End: 2023-12-03 | Stop reason: HOSPADM

## 2023-11-29 RX ORDER — CEFAZOLIN SODIUM 1 G/50ML
1 SOLUTION INTRAVENOUS EVERY 8 HOURS
Status: DISCONTINUED | OUTPATIENT
Start: 2023-11-30 | End: 2023-12-03 | Stop reason: HOSPADM

## 2023-11-29 RX ORDER — ACETAMINOPHEN 650 MG/1
650 SUPPOSITORY RECTAL EVERY 4 HOURS PRN
Status: DISCONTINUED | OUTPATIENT
Start: 2023-11-29 | End: 2023-12-03 | Stop reason: HOSPADM

## 2023-11-29 RX ORDER — HYDROCHLOROTHIAZIDE 12.5 MG/1
6.25 TABLET ORAL DAILY
Status: DISCONTINUED | OUTPATIENT
Start: 2023-11-30 | End: 2023-12-03 | Stop reason: HOSPADM

## 2023-11-29 RX ORDER — DEXTROSE MONOHYDRATE 100 MG/ML
0.3 INJECTION, SOLUTION INTRAVENOUS ONCE AS NEEDED
Status: DISCONTINUED | OUTPATIENT
Start: 2023-11-29 | End: 2023-12-03 | Stop reason: HOSPADM

## 2023-11-29 RX ORDER — DEXTROSE 50 % IN WATER (D50W) INTRAVENOUS SYRINGE
25
Status: DISCONTINUED | OUTPATIENT
Start: 2023-11-29 | End: 2023-12-03 | Stop reason: HOSPADM

## 2023-11-29 RX ORDER — INSULIN LISPRO 100 [IU]/ML
0-10 INJECTION, SOLUTION INTRAVENOUS; SUBCUTANEOUS
Status: DISCONTINUED | OUTPATIENT
Start: 2023-11-30 | End: 2023-12-03 | Stop reason: HOSPADM

## 2023-11-29 RX ORDER — OXYCODONE AND ACETAMINOPHEN 5; 325 MG/1; MG/1
1 TABLET ORAL ONCE
Status: COMPLETED | OUTPATIENT
Start: 2023-11-29 | End: 2023-11-29

## 2023-11-29 RX ORDER — ONDANSETRON HYDROCHLORIDE 2 MG/ML
4 INJECTION, SOLUTION INTRAVENOUS EVERY 8 HOURS PRN
Status: DISCONTINUED | OUTPATIENT
Start: 2023-11-29 | End: 2023-12-03 | Stop reason: HOSPADM

## 2023-11-29 RX ORDER — CEFAZOLIN SODIUM 2 G/100ML
2 INJECTION, SOLUTION INTRAVENOUS DAILY
Status: DISCONTINUED | OUTPATIENT
Start: 2023-11-30 | End: 2023-11-29

## 2023-11-29 RX ORDER — METFORMIN HYDROCHLORIDE 500 MG/1
1000 TABLET, EXTENDED RELEASE ORAL 2 TIMES DAILY
Status: DISCONTINUED | OUTPATIENT
Start: 2023-11-29 | End: 2023-12-03 | Stop reason: HOSPADM

## 2023-11-29 RX ORDER — CEFAZOLIN SODIUM 2 G/100ML
2 INJECTION, SOLUTION INTRAVENOUS ONCE
Status: COMPLETED | OUTPATIENT
Start: 2023-11-29 | End: 2023-11-29

## 2023-11-29 RX ORDER — EMPAGLIFLOZIN 10 MG/1
TABLET, FILM COATED ORAL
Qty: 100 TABLET | Refills: 2 | Status: SHIPPED | OUTPATIENT
Start: 2023-11-29

## 2023-11-29 RX ADMIN — SODIUM CHLORIDE 1000 ML: 9 INJECTION, SOLUTION INTRAVENOUS at 19:36

## 2023-11-29 RX ADMIN — OXYCODONE HYDROCHLORIDE AND ACETAMINOPHEN 1 TABLET: 5; 325 TABLET ORAL at 14:46

## 2023-11-29 RX ADMIN — IOHEXOL 75 ML: 350 INJECTION, SOLUTION INTRAVENOUS at 16:03

## 2023-11-29 RX ADMIN — METRONIDAZOLE 500 MG: 500 INJECTION, SOLUTION INTRAVENOUS at 19:36

## 2023-11-29 RX ADMIN — HYDROMORPHONE HYDROCHLORIDE 0.2 MG: 0.2 INJECTION, SOLUTION INTRAMUSCULAR; INTRAVENOUS; SUBCUTANEOUS at 21:49

## 2023-11-29 RX ADMIN — CEFAZOLIN SODIUM 2 G: 2 INJECTION, SOLUTION INTRAVENOUS at 19:51

## 2023-11-29 ASSESSMENT — PAIN - FUNCTIONAL ASSESSMENT: PAIN_FUNCTIONAL_ASSESSMENT: 0-10

## 2023-11-29 ASSESSMENT — PAIN SCALES - GENERAL
PAINLEVEL_OUTOF10: 10 - WORST POSSIBLE PAIN
PAINLEVEL_OUTOF10: 10 - WORST POSSIBLE PAIN

## 2023-11-29 ASSESSMENT — COLUMBIA-SUICIDE SEVERITY RATING SCALE - C-SSRS
2. HAVE YOU ACTUALLY HAD ANY THOUGHTS OF KILLING YOURSELF?: NO
1. IN THE PAST MONTH, HAVE YOU WISHED YOU WERE DEAD OR WISHED YOU COULD GO TO SLEEP AND NOT WAKE UP?: NO
6. HAVE YOU EVER DONE ANYTHING, STARTED TO DO ANYTHING, OR PREPARED TO DO ANYTHING TO END YOUR LIFE?: NO

## 2023-11-29 ASSESSMENT — PAIN DESCRIPTION - ORIENTATION: ORIENTATION: LEFT

## 2023-11-29 ASSESSMENT — PAIN DESCRIPTION - LOCATION: LOCATION: NECK

## 2023-11-29 NOTE — ED PROVIDER NOTES
HPI   Chief Complaint   Patient presents with    Neck Pain     Pt presents to ED for swollen lymph node to L side of neck with ongoing pain after taking prednisone and atx. Pt covid (+) since Friday. Pt denies CP/SOB. Pt reports dysphagia. Airway patent and intact.         HPI  HISTORY OF PRESENT ILLNESS:  78 y.o. female presenting to the ED with complaint of left neck swelling for the past 4 days.  Patient has had a mild dry cough for about a week as well as some rhinorrhea, she took a COVID test on Friday 11/24 which was positive.  The symptoms have resolved.  She then developed pain and swelling on the left side of the neck, she was seen at an urgent care on Saturday 11/25, and was prescribed prednisone for a presumed swollen lymph node per patient.  She then had a telemedicine visit yesterday with her PCP who prescribed her clindamycin for presumed salivary gland stone or infection.  Patient has taken 3 doses of clindamycin, and states that she is having worsening pain and swelling.  She states that she is now having pain with swallowing and difficulty swallowing as well.  She states that she has had somewhat similar symptoms in the past, states that she had had an episode of pinkeye a few years ago which led to a salivary gland infection, she states that it was drained by ENT.  She denies any swelling, masses, bleeding, or drainage within the mouth.  She denies a sore throat.  No cough persistent cough or nasal congestion.  No neck pain or stiffness.  No headache.  No blurry vision, no pain with eye movements.  She denies any fevers or chills.  No chest pain or shortness of breath, no abdominal pain, no nausea or vomiting.  No other complaints or symptoms voiced.    PMH: DM2, HTN, HLD, parotid and submandibular masses, parotid sialoadenitis  Family history: noncontributory  Social history: non smoker, no ETOH, no illicit substances    12 point review of systems was performed and is negative unless otherwise  specified in HPI.        No data recorded                Patient History   Past Medical History:   Diagnosis Date    Acute pharyngitis, unspecified 2016    Pharyngitis, unspecified etiology    Acute pharyngitis, unspecified 2016    Sore throat    Acute upper respiratory infection, unspecified 2016    Acute upper respiratory infection    Contusion of unspecified knee, initial encounter 2015    Contusion of knee    Dysuria 2015    Dysuria    Encounter for full-term uncomplicated delivery      (spontaneous vaginal delivery)    Other disorders of nervous system 2014    Neurologic disorder    Personal history of other diseases of the circulatory system     History of hypertension    Personal history of other diseases of the respiratory system 2016    History of acute sinusitis    Personal history of pneumonia (recurrent) 2020    History of pneumonia    Personal history of urinary (tract) infections 2015    History of urinary tract infection    Recurrent UTI 2023    Unspecified conjunctivitis 2016    Conjunctivitis, left eye    Varicella without complication     Varicella without complication     Past Surgical History:   Procedure Laterality Date    BLADDER SURGERY  2013    Bladder Surgery    FOOT SURGERY  2013    Foot Surgery    HAND SURGERY  2013    Hand Surgery                                                                                                                                                          HYSTERECTOMY  2013    Hysterectomy    MR HEAD ANGIO WO IV CONTRAST  2019    MR HEAD ANGIO WO IV CONTRAST 2019 [facility]    MR NECK ANGIO WO IV CONTRAST  2019    MR NECK ANGIO WO IV CONTRAST 2019 [facility]    OTHER SURGICAL HISTORY  2013    Brain Surgery    OTHER SURGICAL HISTORY  2019    Arm surgery    OTHER SURGICAL HISTORY  2019    Oophorectomy    OTHER  SURGICAL HISTORY  07/25/2019    Ovarian cystectomy     Family History   Problem Relation Name Age of Onset    Stroke Father      Diabetes Other Family     Hypertension Other Family      Social History     Tobacco Use    Smoking status: Never    Smokeless tobacco: Never   Vaping Use    Vaping Use: Never used   Substance Use Topics    Alcohol use: Never     Comment: occasional    Drug use: Never       Physical Exam   ED Triage Vitals [11/29/23 1358]   Temp Heart Rate Resp BP   36.9 °C (98.5 °F) 64 19 114/68      SpO2 Temp Source Heart Rate Source Patient Position   95 % Temporal Monitor --      BP Location FiO2 (%)     -- --       Physical Exam  Constitutional:       General: She is not in acute distress.     Appearance: She is not toxic-appearing.   HENT:      Head: Normocephalic and atraumatic.      Nose: No congestion.      Mouth/Throat:      Mouth: Mucous membranes are moist.      Pharynx: Oropharynx is clear. No oropharyngeal exudate or posterior oropharyngeal erythema.   Eyes:      General: No scleral icterus.     Extraocular Movements: Extraocular movements intact.      Conjunctiva/sclera: Conjunctivae normal.      Pupils: Pupils are equal, round, and reactive to light.   Neck:      Comments: +edema and tenderness over the left lateral superior neck and overlying the left parotid gland, soft masslike edema, no induration.  Mild overlying erythema.  Skin is intact, no bleeding or drainage.  Has mild pain with neck rotation, but has full range of motion of the neck with flexion and extension and rotation.  No meningeal signs, no nuchal rigidity.  No tenderness over the midline C-spine or paraspinal musculature.  No trismus.  Parotid ducts appear normal.  Normal oropharynx, no lesions, edema, masses, or abscess, no bleeding or drainage noted, tonsils 1+ bilaterally with no exudates, uvula midline, no PTA.  Normal phonation, no hot potato voice.  No elevation of the tongue or the floor of mouth.  No edema or  tenderness over the anterior neck or submandibular area.  Cardiovascular:      Rate and Rhythm: Normal rate and regular rhythm.      Pulses: Normal pulses.   Pulmonary:      Effort: Pulmonary effort is normal. No respiratory distress.      Breath sounds: Normal breath sounds.   Abdominal:      General: There is no distension.      Palpations: Abdomen is soft.   Musculoskeletal:         General: Normal range of motion.      Cervical back: Normal range of motion and neck supple. Tenderness present. No rigidity.      Right lower leg: No edema.      Left lower leg: No edema.   Skin:     General: Skin is warm and dry.      Capillary Refill: Capillary refill takes less than 2 seconds.      Findings: No rash.   Neurological:      General: No focal deficit present.      Mental Status: She is alert and oriented to person, place, and time.      Cranial Nerves: No cranial nerve deficit.      Sensory: No sensory deficit.      Motor: No weakness.      Gait: Gait normal.   Psychiatric:         Mood and Affect: Mood normal.         Behavior: Behavior normal.         Judgment: Judgment normal.       ED Course & MDM   Diagnoses as of 11/29/23 2102   Sialoadenitis       Medical Decision Making  ED course / MDM     Summary:  Patient presented with left-sided neck swelling and pain for the past 4 days.  Seen at an urgent care and given prednisone, telemedicine visit with her PCP and given clindamycin, has had 3 doses.  No fevers.  Vital signs are stable, patient is overall well-appearing, nontoxic, ambulating unassisted.  On exam, there is edema and tenderness over the left lateral superior neck and overlying the parotid gland, no crepitus, some pain with neck range of motion but has intact full neck range of motion with no nuchal rigidity or meningeal signs, normal oropharynx.  Normal phonation, no opted to voice, no stridor, lungs clear to auscultation, no evidence of imminent airway emergency.  No purulence or fluid expressed from  parotid gland with pressure over the area of edema.  IV established, labs drawn.  Labs show a leukocytosis of 12.9, normal hemoglobin, glucose 185, no other electrolyte abnormalities, GFR 56, normal creatinine, normal liver enzymes.  Negative lactate.  CT scan shows findings compatible with acute sialoadenitis due to sialolithiasis.  Patient was given a dose of Percocet in the ED, which should improve her pain for period time, but pain did return. Patient case discussed with ED attending Dr. Sheets, who also saw and evaluated the patient. Discussed with ENT attending Dr. Storey via phone, discussed patient case and reviewed images, no need for transfer as there is no ENT intervention needed, admission versus outpatient management based on patient's clinical picture. Results and differential were discussed in detail with the patient. As she is having intractable pain, she prefers admission, which is reasonable.  ENT recommends aggressive hydration, frequent firm parotid massage, pain control, and IV antibiotics.  IV antibiotics and fluids were started in the ED. Patient was accepted for observation.    Impression:  1. See diagnosis     Disposition: Admission    Patient seen and discussed with Dr. Sheets    This note has been transcribed using voice recognition and may contain grammatical errors, misplaced words, incorrect words, incorrect phrases or other errors.     Procedure  Procedures     Polly Jeffrey PA-C  11/29/23 7603

## 2023-11-30 LAB
ANION GAP SERPL CALC-SCNC: 14 MMOL/L (ref 10–20)
BUN SERPL-MCNC: 19 MG/DL (ref 6–23)
CALCIUM SERPL-MCNC: 9.1 MG/DL (ref 8.6–10.3)
CHLORIDE SERPL-SCNC: 103 MMOL/L (ref 98–107)
CO2 SERPL-SCNC: 23 MMOL/L (ref 21–32)
CREAT SERPL-MCNC: 0.91 MG/DL (ref 0.5–1.05)
ERYTHROCYTE [DISTWIDTH] IN BLOOD BY AUTOMATED COUNT: 13.2 % (ref 11.5–14.5)
GFR SERPL CREATININE-BSD FRML MDRD: 66 ML/MIN/1.73M*2
GLUCOSE BLD MANUAL STRIP-MCNC: 140 MG/DL (ref 74–99)
GLUCOSE BLD MANUAL STRIP-MCNC: 157 MG/DL (ref 74–99)
GLUCOSE BLD MANUAL STRIP-MCNC: 188 MG/DL (ref 74–99)
GLUCOSE BLD MANUAL STRIP-MCNC: 265 MG/DL (ref 74–99)
GLUCOSE SERPL-MCNC: 132 MG/DL (ref 74–99)
HCT VFR BLD AUTO: 37.4 % (ref 36–46)
HGB BLD-MCNC: 12.4 G/DL (ref 12–16)
MCH RBC QN AUTO: 28.6 PG (ref 26–34)
MCHC RBC AUTO-ENTMCNC: 33.2 G/DL (ref 32–36)
MCV RBC AUTO: 86 FL (ref 80–100)
NRBC BLD-RTO: 0 /100 WBCS (ref 0–0)
PLATELET # BLD AUTO: 297 X10*3/UL (ref 150–450)
POTASSIUM SERPL-SCNC: 4.1 MMOL/L (ref 3.5–5.3)
RBC # BLD AUTO: 4.34 X10*6/UL (ref 4–5.2)
SODIUM SERPL-SCNC: 136 MMOL/L (ref 136–145)
WBC # BLD AUTO: 14.6 X10*3/UL (ref 4.4–11.3)

## 2023-11-30 PROCEDURE — 2500000001 HC RX 250 WO HCPCS SELF ADMINISTERED DRUGS (ALT 637 FOR MEDICARE OP): Performed by: PHARMACIST

## 2023-11-30 PROCEDURE — 80048 BASIC METABOLIC PNL TOTAL CA: CPT | Performed by: NURSE PRACTITIONER

## 2023-11-30 PROCEDURE — 2500000001 HC RX 250 WO HCPCS SELF ADMINISTERED DRUGS (ALT 637 FOR MEDICARE OP): Performed by: NURSE PRACTITIONER

## 2023-11-30 PROCEDURE — 82947 ASSAY GLUCOSE BLOOD QUANT: CPT

## 2023-11-30 PROCEDURE — 85027 COMPLETE CBC AUTOMATED: CPT | Performed by: NURSE PRACTITIONER

## 2023-11-30 PROCEDURE — 36415 COLL VENOUS BLD VENIPUNCTURE: CPT | Performed by: NURSE PRACTITIONER

## 2023-11-30 PROCEDURE — 2500000002 HC RX 250 W HCPCS SELF ADMINISTERED DRUGS (ALT 637 FOR MEDICARE OP, ALT 636 FOR OP/ED): Performed by: NURSE PRACTITIONER

## 2023-11-30 PROCEDURE — 2500000004 HC RX 250 GENERAL PHARMACY W/ HCPCS (ALT 636 FOR OP/ED): Performed by: PHARMACIST

## 2023-11-30 PROCEDURE — 99232 SBSQ HOSP IP/OBS MODERATE 35: CPT | Performed by: NURSE PRACTITIONER

## 2023-11-30 PROCEDURE — 2500000004 HC RX 250 GENERAL PHARMACY W/ HCPCS (ALT 636 FOR OP/ED): Performed by: INTERNAL MEDICINE

## 2023-11-30 PROCEDURE — 99221 1ST HOSP IP/OBS SF/LOW 40: CPT | Performed by: OTOLARYNGOLOGY

## 2023-11-30 PROCEDURE — G0378 HOSPITAL OBSERVATION PER HR: HCPCS

## 2023-11-30 PROCEDURE — 2500000004 HC RX 250 GENERAL PHARMACY W/ HCPCS (ALT 636 FOR OP/ED): Performed by: NURSE PRACTITIONER

## 2023-11-30 RX ORDER — ENOXAPARIN SODIUM 100 MG/ML
40 INJECTION SUBCUTANEOUS EVERY 24 HOURS
Status: DISCONTINUED | OUTPATIENT
Start: 2023-11-30 | End: 2023-12-03 | Stop reason: HOSPADM

## 2023-11-30 RX ORDER — CEFAZOLIN SODIUM 2 G/100ML
INJECTION, SOLUTION INTRAVENOUS
Status: DISPENSED
Start: 2023-11-30 | End: 2023-11-30

## 2023-11-30 RX ADMIN — HYDROMORPHONE HYDROCHLORIDE 0.4 MG: 1 INJECTION, SOLUTION INTRAMUSCULAR; INTRAVENOUS; SUBCUTANEOUS at 00:53

## 2023-11-30 RX ADMIN — LISINOPRIL 40 MG: 20 TABLET ORAL at 09:34

## 2023-11-30 RX ADMIN — POLYETHYLENE GLYCOL 3350 17 G: 17 POWDER, FOR SOLUTION ORAL at 09:31

## 2023-11-30 RX ADMIN — AMLODIPINE BESYLATE 2.5 MG: 5 TABLET ORAL at 09:35

## 2023-11-30 RX ADMIN — EMPAGLIFLOZIN 10 MG: 10 TABLET, FILM COATED ORAL at 09:36

## 2023-11-30 RX ADMIN — HYDROMORPHONE HYDROCHLORIDE 0.4 MG: 1 INJECTION, SOLUTION INTRAMUSCULAR; INTRAVENOUS; SUBCUTANEOUS at 21:34

## 2023-11-30 RX ADMIN — BISOPROLOL FUMARATE 10 MG: 5 TABLET, FILM COATED ORAL at 10:03

## 2023-11-30 RX ADMIN — HYDROMORPHONE HYDROCHLORIDE 0.4 MG: 1 INJECTION, SOLUTION INTRAMUSCULAR; INTRAVENOUS; SUBCUTANEOUS at 06:54

## 2023-11-30 RX ADMIN — METRONIDAZOLE 500 MG: 500 INJECTION, SOLUTION INTRAVENOUS at 11:13

## 2023-11-30 RX ADMIN — ASPIRIN 81 MG: 81 TABLET, COATED ORAL at 09:32

## 2023-11-30 RX ADMIN — ROSUVASTATIN CALCIUM 20 MG: 20 TABLET, FILM COATED ORAL at 09:35

## 2023-11-30 RX ADMIN — HYDROMORPHONE HYDROCHLORIDE 0.4 MG: 1 INJECTION, SOLUTION INTRAMUSCULAR; INTRAVENOUS; SUBCUTANEOUS at 16:55

## 2023-11-30 RX ADMIN — METRONIDAZOLE 500 MG: 500 INJECTION, SOLUTION INTRAVENOUS at 03:53

## 2023-11-30 RX ADMIN — HYDROCHLOROTHIAZIDE 6.25 MG: 25 TABLET ORAL at 09:32

## 2023-11-30 RX ADMIN — INSULIN LISPRO 2 UNITS: 100 INJECTION, SOLUTION INTRAVENOUS; SUBCUTANEOUS at 12:00

## 2023-11-30 RX ADMIN — ACETAMINOPHEN 650 MG: 325 TABLET ORAL at 09:32

## 2023-11-30 RX ADMIN — CEFAZOLIN SODIUM 1 G: 1 INJECTION, SOLUTION INTRAVENOUS at 21:35

## 2023-11-30 RX ADMIN — INSULIN LISPRO 6 UNITS: 100 INJECTION, SOLUTION INTRAVENOUS; SUBCUTANEOUS at 16:51

## 2023-11-30 RX ADMIN — CEFAZOLIN SODIUM 1 G: 1 INJECTION, SOLUTION INTRAVENOUS at 12:01

## 2023-11-30 RX ADMIN — HYDROMORPHONE HYDROCHLORIDE 0.4 MG: 1 INJECTION, SOLUTION INTRAMUSCULAR; INTRAVENOUS; SUBCUTANEOUS at 10:04

## 2023-11-30 RX ADMIN — VANCOMYCIN HYDROCHLORIDE 1.25 G: 10 INJECTION, POWDER, LYOPHILIZED, FOR SOLUTION INTRAVENOUS at 14:36

## 2023-11-30 RX ADMIN — CEFAZOLIN SODIUM 1 G: 1 INJECTION, SOLUTION INTRAVENOUS at 04:21

## 2023-11-30 SDOH — ECONOMIC STABILITY: FOOD INSECURITY: WITHIN THE PAST 12 MONTHS, THE FOOD YOU BOUGHT JUST DIDN'T LAST AND YOU DIDN'T HAVE MONEY TO GET MORE.: NEVER TRUE

## 2023-11-30 SDOH — HEALTH STABILITY: PHYSICAL HEALTH: ON AVERAGE, HOW MANY MINUTES DO YOU ENGAGE IN EXERCISE AT THIS LEVEL?: 0 MIN

## 2023-11-30 SDOH — ECONOMIC STABILITY: FOOD INSECURITY: WITHIN THE PAST 12 MONTHS, YOU WORRIED THAT YOUR FOOD WOULD RUN OUT BEFORE YOU GOT MONEY TO BUY MORE.: NEVER TRUE

## 2023-11-30 SDOH — ECONOMIC STABILITY: INCOME INSECURITY: IN THE PAST 12 MONTHS, HAS THE ELECTRIC, GAS, OIL, OR WATER COMPANY THREATENED TO SHUT OFF SERVICE IN YOUR HOME?: NO

## 2023-11-30 SDOH — SOCIAL STABILITY: SOCIAL INSECURITY: WITHIN THE LAST YEAR, HAVE YOU BEEN HUMILIATED OR EMOTIONALLY ABUSED IN OTHER WAYS BY YOUR PARTNER OR EX-PARTNER?: NO

## 2023-11-30 SDOH — HEALTH STABILITY: MENTAL HEALTH: HOW MANY STANDARD DRINKS CONTAINING ALCOHOL DO YOU HAVE ON A TYPICAL DAY?: PATIENT DOES NOT DRINK

## 2023-11-30 SDOH — HEALTH STABILITY: MENTAL HEALTH: HOW OFTEN DO YOU HAVE A DRINK CONTAINING ALCOHOL?: NEVER

## 2023-11-30 SDOH — ECONOMIC STABILITY: TRANSPORTATION INSECURITY
IN THE PAST 12 MONTHS, HAS THE LACK OF TRANSPORTATION KEPT YOU FROM MEDICAL APPOINTMENTS OR FROM GETTING MEDICATIONS?: NO

## 2023-11-30 SDOH — ECONOMIC STABILITY: INCOME INSECURITY: IN THE LAST 12 MONTHS, WAS THERE A TIME WHEN YOU WERE NOT ABLE TO PAY THE MORTGAGE OR RENT ON TIME?: NO

## 2023-11-30 SDOH — SOCIAL STABILITY: SOCIAL INSECURITY: HAS ANYONE EVER THREATENED TO HURT YOUR FAMILY OR YOUR PETS?: NO

## 2023-11-30 SDOH — SOCIAL STABILITY: SOCIAL NETWORK
IN A TYPICAL WEEK, HOW MANY TIMES DO YOU TALK ON THE PHONE WITH FAMILY, FRIENDS, OR NEIGHBORS?: MORE THAN THREE TIMES A WEEK

## 2023-11-30 SDOH — SOCIAL STABILITY: SOCIAL NETWORK: HOW OFTEN DO YOU ATTEND CHURCH OR RELIGIOUS SERVICES?: NEVER

## 2023-11-30 SDOH — HEALTH STABILITY: MENTAL HEALTH
STRESS IS WHEN SOMEONE FEELS TENSE, NERVOUS, ANXIOUS, OR CAN'T SLEEP AT NIGHT BECAUSE THEIR MIND IS TROUBLED. HOW STRESSED ARE YOU?: NOT AT ALL

## 2023-11-30 SDOH — SOCIAL STABILITY: SOCIAL INSECURITY: WITHIN THE LAST YEAR, HAVE YOU BEEN AFRAID OF YOUR PARTNER OR EX-PARTNER?: NO

## 2023-11-30 SDOH — ECONOMIC STABILITY: INCOME INSECURITY: HOW HARD IS IT FOR YOU TO PAY FOR THE VERY BASICS LIKE FOOD, HOUSING, MEDICAL CARE, AND HEATING?: NOT HARD AT ALL

## 2023-11-30 SDOH — SOCIAL STABILITY: SOCIAL NETWORK: HOW OFTEN DO YOU GET TOGETHER WITH FRIENDS OR RELATIVES?: MORE THAN THREE TIMES A WEEK

## 2023-11-30 SDOH — SOCIAL STABILITY: SOCIAL NETWORK: ARE YOU MARRIED, WIDOWED, DIVORCED, SEPARATED, NEVER MARRIED, OR LIVING WITH A PARTNER?: MARRIED

## 2023-11-30 SDOH — SOCIAL STABILITY: SOCIAL NETWORK
DO YOU BELONG TO ANY CLUBS OR ORGANIZATIONS SUCH AS CHURCH GROUPS UNIONS, FRATERNAL OR ATHLETIC GROUPS, OR SCHOOL GROUPS?: NO

## 2023-11-30 SDOH — SOCIAL STABILITY: SOCIAL INSECURITY: DO YOU FEEL ANYONE HAS EXPLOITED OR TAKEN ADVANTAGE OF YOU FINANCIALLY OR OF YOUR PERSONAL PROPERTY?: NO

## 2023-11-30 SDOH — ECONOMIC STABILITY: TRANSPORTATION INSECURITY
IN THE PAST 12 MONTHS, HAS LACK OF TRANSPORTATION KEPT YOU FROM MEETINGS, WORK, OR FROM GETTING THINGS NEEDED FOR DAILY LIVING?: NO

## 2023-11-30 SDOH — HEALTH STABILITY: PHYSICAL HEALTH: ON AVERAGE, HOW MANY DAYS PER WEEK DO YOU ENGAGE IN MODERATE TO STRENUOUS EXERCISE (LIKE A BRISK WALK)?: 0 DAYS

## 2023-11-30 SDOH — ECONOMIC STABILITY: HOUSING INSECURITY
IN THE LAST 12 MONTHS, WAS THERE A TIME WHEN YOU DID NOT HAVE A STEADY PLACE TO SLEEP OR SLEPT IN A SHELTER (INCLUDING NOW)?: NO

## 2023-11-30 SDOH — SOCIAL STABILITY: SOCIAL INSECURITY: HAVE YOU HAD THOUGHTS OF HARMING ANYONE ELSE?: YES

## 2023-11-30 SDOH — HEALTH STABILITY: MENTAL HEALTH: HOW OFTEN DO YOU HAVE 6 OR MORE DRINKS ON ONE OCCASION?: NEVER

## 2023-11-30 SDOH — SOCIAL STABILITY: SOCIAL INSECURITY
WITHIN THE LAST YEAR, HAVE TO BEEN RAPED OR FORCED TO HAVE ANY KIND OF SEXUAL ACTIVITY BY YOUR PARTNER OR EX-PARTNER?: NO

## 2023-11-30 SDOH — ECONOMIC STABILITY: HOUSING INSECURITY: IN THE LAST 12 MONTHS, HOW MANY PLACES HAVE YOU LIVED?: 1

## 2023-11-30 SDOH — SOCIAL STABILITY: SOCIAL INSECURITY
WITHIN THE LAST YEAR, HAVE YOU BEEN KICKED, HIT, SLAPPED, OR OTHERWISE PHYSICALLY HURT BY YOUR PARTNER OR EX-PARTNER?: NO

## 2023-11-30 SDOH — SOCIAL STABILITY: SOCIAL INSECURITY: ABUSE: ADULT

## 2023-11-30 SDOH — SOCIAL STABILITY: SOCIAL INSECURITY: WERE YOU ABLE TO COMPLETE ALL THE BEHAVIORAL HEALTH SCREENINGS?: YES

## 2023-11-30 SDOH — SOCIAL STABILITY: SOCIAL INSECURITY: ARE YOU OR HAVE YOU BEEN THREATENED OR ABUSED PHYSICALLY, EMOTIONALLY, OR SEXUALLY BY ANYONE?: NO

## 2023-11-30 SDOH — SOCIAL STABILITY: SOCIAL NETWORK: HOW OFTEN DO YOU ATTENT MEETINGS OF THE CLUB OR ORGANIZATION YOU BELONG TO?: NEVER

## 2023-11-30 SDOH — SOCIAL STABILITY: SOCIAL INSECURITY: ARE THERE ANY APPARENT SIGNS OF INJURIES/BEHAVIORS THAT COULD BE RELATED TO ABUSE/NEGLECT?: NO

## 2023-11-30 SDOH — SOCIAL STABILITY: SOCIAL INSECURITY: DO YOU FEEL UNSAFE GOING BACK TO THE PLACE WHERE YOU ARE LIVING?: NO

## 2023-11-30 SDOH — SOCIAL STABILITY: SOCIAL INSECURITY: DOES ANYONE TRY TO KEEP YOU FROM HAVING/CONTACTING OTHER FRIENDS OR DOING THINGS OUTSIDE YOUR HOME?: NO

## 2023-11-30 ASSESSMENT — COGNITIVE AND FUNCTIONAL STATUS - GENERAL
MOBILITY SCORE: 24
MOVING TO AND FROM BED TO CHAIR: A LITTLE
STANDING UP FROM CHAIR USING ARMS: A LITTLE
PATIENT BASELINE BEDBOUND: NO
DAILY ACTIVITIY SCORE: 24
MOBILITY SCORE: 22
DAILY ACTIVITIY SCORE: 24

## 2023-11-30 ASSESSMENT — ACTIVITIES OF DAILY LIVING (ADL)
PATIENT'S MEMORY ADEQUATE TO SAFELY COMPLETE DAILY ACTIVITIES?: YES
JUDGMENT_ADEQUATE_SAFELY_COMPLETE_DAILY_ACTIVITIES: YES
LACK_OF_TRANSPORTATION: NO
BATHING: INDEPENDENT
FEEDING YOURSELF: INDEPENDENT
DRESSING YOURSELF: INDEPENDENT
TOILETING: INDEPENDENT
HEARING - LEFT EAR: FUNCTIONAL
GROOMING: INDEPENDENT
HEARING - RIGHT EAR: FUNCTIONAL
ADEQUATE_TO_COMPLETE_ADL: YES
WALKS IN HOME: INDEPENDENT

## 2023-11-30 ASSESSMENT — LIFESTYLE VARIABLES
AUDIT-C TOTAL SCORE: 1
HOW MANY STANDARD DRINKS CONTAINING ALCOHOL DO YOU HAVE ON A TYPICAL DAY: 1 OR 2
HOW OFTEN DO YOU HAVE A DRINK CONTAINING ALCOHOL: MONTHLY OR LESS
SKIP TO QUESTIONS 9-10: 1
SKIP TO QUESTIONS 9-10: 1
PRESCIPTION_ABUSE_PAST_12_MONTHS: NO
HOW OFTEN DO YOU HAVE 6 OR MORE DRINKS ON ONE OCCASION: NEVER
AUDIT-C TOTAL SCORE: 0
SUBSTANCE_ABUSE_PAST_12_MONTHS: NO
AUDIT-C TOTAL SCORE: 1

## 2023-11-30 ASSESSMENT — PAIN SCALES - GENERAL
PAINLEVEL_OUTOF10: 0 - NO PAIN
PAINLEVEL_OUTOF10: 10 - WORST POSSIBLE PAIN
PAINLEVEL_OUTOF10: 7

## 2023-11-30 ASSESSMENT — PAIN DESCRIPTION - ORIENTATION: ORIENTATION: LEFT

## 2023-11-30 ASSESSMENT — PAIN - FUNCTIONAL ASSESSMENT
PAIN_FUNCTIONAL_ASSESSMENT: 0-10
PAIN_FUNCTIONAL_ASSESSMENT: 0-10

## 2023-11-30 ASSESSMENT — PATIENT HEALTH QUESTIONNAIRE - PHQ9
2. FEELING DOWN, DEPRESSED OR HOPELESS: NOT AT ALL
SUM OF ALL RESPONSES TO PHQ9 QUESTIONS 1 & 2: 0
1. LITTLE INTEREST OR PLEASURE IN DOING THINGS: NOT AT ALL

## 2023-11-30 ASSESSMENT — PAIN DESCRIPTION - LOCATION: LOCATION: JAW

## 2023-11-30 NOTE — PROGRESS NOTES
Vancomycin Dosing by Pharmacy- INITIAL    Tanya Fan is a 78 y.o. year old female who Pharmacy has been consulted for vancomycin dosing for cellulitis, skin and soft tissue. Based on the patient's indication and renal status this patient will be dosed based on a goal AUC of 400-600.     Renal function is currently stable.    Visit Vitals  /83 (BP Location: Right arm, Patient Position: Sitting)   Pulse 108   Temp 36.9 °C (98.5 °F) (Temporal)   Resp 18        Lab Results   Component Value Date    CREATININE 0.91 11/30/2023    CREATININE 1.03 11/29/2023    CREATININE 0.89 09/01/2023    CREATININE 0.81 02/22/2023    CREATININE 0.80 08/22/2022    CREATININE 0.92 11/05/2021        Assessment/Plan     Patient will not be given a loading dose.  Will initiate vancomycin maintenance,  1250 mg every 24 hours.    This dosing regimen is predicted by [employer] to result in the following pharmacokinetic parameters:  Regimen: 1250 mg IV every 24 hours.  Start time: 12:24 on 11/30/2023  Exposure target: AUC24 (range)400-600 mg/L.hr   AUC24,ss: 432 mg/L.hr  Probability of AUC24 > 400: 59 %  Ctrough,ss: 12.5 mg/L  Probability of Ctrough,ss > 20: 9 %  Probability of nephrotoxicity (Lodise ADRIÁN 2009): 8 %    Follow-up level has been ordered with AM labs on 12/1.   Will continue to monitor renal function daily while on vancomycin and order serum creatinine at least every 48 hours if not already ordered.  Follow for continued vancomycin needs, clinical response, and signs/symptoms of toxicity.     Niyah Goff, PharmD

## 2023-11-30 NOTE — H&P
History Of Present Illness  Tanya Fan is a 78 y.o. female presenting with swelling of the left side of her neck.  Patient recently was diagnosed with COVID on Friday, November 24.  She noticed swelling of the left side of her neck.  Patient states she noticed worsening pain overnight.  She did call her primary care doctor and she put her on clindamycin due to multiple allergies.  She told her to suck on sour candies and follow-up with ENT.  She called Dr. Mclain to make an appointment but he could not see her today.  Her pain got worse so she came into the ER.  CT neck shows multiple stones with stranding around the parotid gland.  Patient was admitted for pain control and IV antibiotics.  ER called ENT down at [facility] they said there was no need to transfer her down to [facility].  Admit for pain control along with IV antibiotics and close ENT follow-up outpatient.     Past Medical History  She has a past medical history of Acute pharyngitis, unspecified (05/07/2016), Acute pharyngitis, unspecified (05/07/2016), Acute upper respiratory infection, unspecified (06/02/2016), Contusion of unspecified knee, initial encounter (07/30/2015), Dysuria (04/09/2015), Encounter for full-term uncomplicated delivery, Other disorders of nervous system (04/04/2014), Personal history of other diseases of the circulatory system, Personal history of other diseases of the respiratory system (05/12/2016), Personal history of pneumonia (recurrent) (02/14/2020), Personal history of urinary (tract) infections (04/13/2015), Recurrent UTI (04/04/2023), Unspecified conjunctivitis (05/27/2016), and Varicella without complication.    Surgical History  She has a past surgical history that includes Other surgical history (11/14/2013); Bladder surgery (11/14/2013); Hysterectomy (11/14/2013); Foot surgery (11/14/2013); Other surgical history (07/25/2019); Other surgical history (07/25/2019); Other surgical history (07/25/2019); Hand  surgery (12/13/2013); MR angio head wo IV contrast (6/27/2019); and MR angio neck wo IV contrast (6/27/2019).     Social History  She reports that she has never smoked. She has never used smokeless tobacco. She reports that she does not drink alcohol and does not use drugs.    Family History  Family History   Problem Relation Name Age of Onset    Stroke Father      Diabetes Other Family     Hypertension Other Family         Allergies  Amoxicillin-pot clavulanate, Doxycycline, and Aspartame    Review of Systems   Gen: No fatigue, fever, sweats.  Head: No headache, trauma.  Eyes: No vision loss, double vision, drainage, eye pain.  ENT: No hearing changes, pain, epistaxis, congestion POSITIVE neck swelling  Cardiac: No chest pain  Pulmonary: No shortness of breath,  pleuritic pain,   Heme/lymph: No swollen glands  GI: No abdominal pain, nausea, vomiting, diarrhea  : No  dysuria, frequency, urgency, hematuria  Musculoskeletal: No limb pain, joint pain, back pain, joint swelling or stiffness.  Skin: No rashes, pruritus, lumps, lesions.  Neuro: No Numbness, tingling, or weakness.  Psych: No  anxiety     Review of systems is otherwise negative unless stated above or in history of present illness.    Physical Exam   General: Vital signs stable, Pt is alert, no acute distress  Eyes: Conjunctiva normal, PERRL, EOMs intact  HENMT: Normocephalic, atraumatic, external ears and nose normal, no scars POSITIVE parotid swelling with pain on palpation and erythema.  No mastoid tenderness. Trachea is midline. No meningeal signs, negative Kernig and Brudzinski, moves neck freely.  No sinus tenderness  Resp: Respiratory effort is normal, no retractions, no stridor. Lungs CTA, no wheezes or rhonchi  CV: Heart is regular rate and rhythm.   Skin: No evidence of trauma, skin is warm and dry. No rashes, lesions or ulcers.  Skel: full range of motion of upper and lower extremities.   Neuro: Normal gait, CN II-XII intact, no motor or sensory  changes.  Psych: Alert and oriented ×3, judgment is appropriate, normal mood and affect     Last Recorded Vitals  /68   Pulse 64   Temp 36.9 °C (98.5 °F) (Temporal)   Resp 19   Wt 74.8 kg (165 lb)   SpO2 95%     Relevant Results        Results for orders placed or performed during the hospital encounter of 11/29/23 (from the past 24 hour(s))   CBC and Auto Differential   Result Value Ref Range    WBC 12.9 (H) 4.4 - 11.3 x10*3/uL    nRBC 0.0 0.0 - 0.0 /100 WBCs    RBC 4.51 4.00 - 5.20 x10*6/uL    Hemoglobin 12.7 12.0 - 16.0 g/dL    Hematocrit 39.8 36.0 - 46.0 %    MCV 88 80 - 100 fL    MCH 28.2 26.0 - 34.0 pg    MCHC 31.9 (L) 32.0 - 36.0 g/dL    RDW 13.2 11.5 - 14.5 %    Platelets 310 150 - 450 x10*3/uL    Neutrophils % 66.8 40.0 - 80.0 %    Immature Granulocytes %, Automated 1.3 (H) 0.0 - 0.9 %    Lymphocytes % 22.4 13.0 - 44.0 %    Monocytes % 8.6 2.0 - 10.0 %    Eosinophils % 0.5 0.0 - 6.0 %    Basophils % 0.4 0.0 - 2.0 %    Neutrophils Absolute 8.64 (H) 1.60 - 5.50 x10*3/uL    Immature Granulocytes Absolute, Automated 0.17 0.00 - 0.50 x10*3/uL    Lymphocytes Absolute 2.90 0.80 - 3.00 x10*3/uL    Monocytes Absolute 1.11 (H) 0.05 - 0.80 x10*3/uL    Eosinophils Absolute 0.07 0.00 - 0.40 x10*3/uL    Basophils Absolute 0.05 0.00 - 0.10 x10*3/uL   Comprehensive metabolic panel   Result Value Ref Range    Glucose 185 (H) 74 - 99 mg/dL    Sodium 138 136 - 145 mmol/L    Potassium 4.1 3.5 - 5.3 mmol/L    Chloride 103 98 - 107 mmol/L    Bicarbonate 25 21 - 32 mmol/L    Anion Gap 14 10 - 20 mmol/L    Urea Nitrogen 23 6 - 23 mg/dL    Creatinine 1.03 0.50 - 1.05 mg/dL    eGFR 56 (L) >60 mL/min/1.73m*2    Calcium 9.7 8.6 - 10.3 mg/dL    Albumin 4.2 3.4 - 5.0 g/dL    Alkaline Phosphatase 67 33 - 136 U/L    Total Protein 6.9 6.4 - 8.2 g/dL    AST 14 9 - 39 U/L    Bilirubin, Total 0.4 0.0 - 1.2 mg/dL    ALT 15 7 - 45 U/L   Lactate   Result Value Ref Range    Lactate 1.2 0.4 - 2.0 mmol/L     CT soft tissue neck w IV  contrast    Result Date: 11/29/2023  Interpreted By:  Chi Sanchez, STUDY: CT SOFT TISSUE NECK W IV CONTRAST;  11/29/2023 4:14 pm   INDICATION: Signs/Symptoms:swelling left side of neck, difficulty swallowing.   COMPARISON: CT soft tissue neck 12/26/2019   ACCESSION NUMBER(S): [id number]   ORDERING CLINICIAN: LIGIA LONGORIA   TECHNIQUE: Axial CT images of the neck were obtained.  The patient received 75 cc Omnipaque 350 intravenous contrast agent. The images were reformatted in angled axial, coronal and sagittal planes.   FINDINGS: Oral Cavity, Pharynx and Larynx:  Evaluation oral cavity is limited by streak artifact from dental hardware.  The nasopharyngeal and oropharyngeal structures are unremarkable. The hypopharyngeal and laryngeal structures are unremarkable.   Retropharyngeal and Prevertebral Soft Tissues: Unremarkable.   Lymph nodes: There are few non specific bilateral neck nodes, probably reactive in etiology.   Neck vessels: Bilateral neck vessels are normal in course and caliber and appear patent.   Thyroid gland: Several low-attenuation nodules scattered throughout the thyroid gland with the largest measuring 8 mm.   Parotid and submandibular glands: Innumerable calcifications scattered throughout bilateral parotid glands, more prominent on the left. There is asymmetric enlargement of the left parotid gland with diffuse soft tissue stranding and adjacent platysmal muscle thickening. Findings are concerning for acute sialoadenitis secondary to several sialoliths. No dilation of Stensen duct. Several calcification within the right parotid gland without acute inflammatory changes. Bilateral submandibular glands are unremarkable.   Paranasal Sinuses and Mastoids: Left maxillary sinus mucous retention cyst. Mild paranasal sinus mucosal thickening otherwise.   Visualized orbital structures are unremarkable. Status post right frontotemporal craniotomy.   Visualized upper lungs are clear.   Moderate to  severe multilevel degenerative changes of the cervical spine most prominent at C4-C5.       Several bilateral parotid gland sialoliths, with heterogeneous enlargement, and surrounding soft tissue stranding involving the left parotid gland, compatible with acute sialoadenitis due to sialolithiasis. No Stensen duct dilation. No suspicious cervical adenopathy.     MACRO: None   Signed by: Chi Sanchez 11/29/2023 4:34 PM Dictation workstation:   [id number]        Assessment/Plan   Principal Problem:    Sialoadenitis  -CBC Wbc 12.9  -CMP   -CT neck Several bilateral parotid gland sialoliths, with heterogeneous enlargement, and surrounding soft tissue stranding involving the left parotid gland, compatible with acute sialoadenitis due to sialolithiasis. No Stensen duct dilation. No suspicious cervical adenopathy  -pain control  -parotid massage q4h  -trend lab work  -Flagyl 500 mg ivpb q8h  -cefazolin 1 gm ivpb q24h    DM/HTN  -Hold metformin due to contrast  -continue all other meds  -insulin sliding scale  -accu check ac/hs    Labs/Testing reviewed    Iv abx  Parotid massage  Trend lab work  DC home with close ENT follow up when feeling better    * 45 minutes total spent on patient's care today; >50% time spent on counseling/coordination of care         Mirella Ni, APRN-CNP

## 2023-11-30 NOTE — PROGRESS NOTES
"Tanya Fan is a 78 y.o. female on day 0 of admission presenting with Sialoadenitis.    Subjective   Patient pain is more manageable.  Will be seen by ENT and ID today for further evaluation     ROS  Gen: No fatigue, fever, sweats.  Head: No headache, trauma.  Eyes: No vision loss, double vision, drainage, eye pain.  ENT: No hearing changes, pain, epistaxis, congestion. POSITIVE parotid swelling and pain on palpation  Cardiac: No chest pain  Pulmonary: No shortness of breath,  pleuritic pain,   Heme/lymph: No swollen glands  GI: No abdominal pain, nausea, vomiting, diarrhea  : No  dysuria, frequency, urgency, hematuria  Musculoskeletal: No limb pain, joint pain, back pain, joint swelling or stiffness.  Skin: No rashes, pruritus, lumps, lesions.  Neuro: No Numbness, tingling, or weakness.  Psych: No  anxiety     Review of systems is otherwise negative unless stated above or in history of present illness.    Objective     Physical Exam  General: Vital signs stable, Pt is alert, no acute distress  Eyes: Conjunctiva normal, PERRL, EOMs intact  HENMT: Normocephalic, atraumatic, external ears and nose normal, no scars POSITIVE large parotid swelling on left neck.  No mastoid tenderness. Trachea is midline. No meningeal signs, negative Kernig and Brudzinski, moves neck freely.  No sinus tenderness  Resp: Respiratory effort is normal, no retractions, no stridor. Lungs CTA, no wheezes or rhonchi  CV: Heart is regular rate and rhythm.   Skin: No evidence of trauma, skin is warm and dry. No rashes, lesions or ulcers.  Skel: full range of motion of upper and lower extremities.   Neuro: Normal gait, CN II-XII intact, no motor or sensory changes.  Psych: Alert and oriented ×3, judgment is appropriate, normal mood and affect      Last Recorded Vitals  Blood pressure 101/58, pulse 62, temperature 36.4 °C (97.5 °F), temperature source Temporal, resp. rate 18, height 1.626 m (5' 4\"), weight 74.8 kg (165 lb), SpO2 95 " %.  Intake/Output last 3 Shifts:  No intake/output data recorded.    Relevant Results  Results for orders placed or performed during the hospital encounter of 11/29/23 (from the past 24 hour(s))   CBC and Auto Differential   Result Value Ref Range    WBC 12.9 (H) 4.4 - 11.3 x10*3/uL    nRBC 0.0 0.0 - 0.0 /100 WBCs    RBC 4.51 4.00 - 5.20 x10*6/uL    Hemoglobin 12.7 12.0 - 16.0 g/dL    Hematocrit 39.8 36.0 - 46.0 %    MCV 88 80 - 100 fL    MCH 28.2 26.0 - 34.0 pg    MCHC 31.9 (L) 32.0 - 36.0 g/dL    RDW 13.2 11.5 - 14.5 %    Platelets 310 150 - 450 x10*3/uL    Neutrophils % 66.8 40.0 - 80.0 %    Immature Granulocytes %, Automated 1.3 (H) 0.0 - 0.9 %    Lymphocytes % 22.4 13.0 - 44.0 %    Monocytes % 8.6 2.0 - 10.0 %    Eosinophils % 0.5 0.0 - 6.0 %    Basophils % 0.4 0.0 - 2.0 %    Neutrophils Absolute 8.64 (H) 1.60 - 5.50 x10*3/uL    Immature Granulocytes Absolute, Automated 0.17 0.00 - 0.50 x10*3/uL    Lymphocytes Absolute 2.90 0.80 - 3.00 x10*3/uL    Monocytes Absolute 1.11 (H) 0.05 - 0.80 x10*3/uL    Eosinophils Absolute 0.07 0.00 - 0.40 x10*3/uL    Basophils Absolute 0.05 0.00 - 0.10 x10*3/uL   Comprehensive metabolic panel   Result Value Ref Range    Glucose 185 (H) 74 - 99 mg/dL    Sodium 138 136 - 145 mmol/L    Potassium 4.1 3.5 - 5.3 mmol/L    Chloride 103 98 - 107 mmol/L    Bicarbonate 25 21 - 32 mmol/L    Anion Gap 14 10 - 20 mmol/L    Urea Nitrogen 23 6 - 23 mg/dL    Creatinine 1.03 0.50 - 1.05 mg/dL    eGFR 56 (L) >60 mL/min/1.73m*2    Calcium 9.7 8.6 - 10.3 mg/dL    Albumin 4.2 3.4 - 5.0 g/dL    Alkaline Phosphatase 67 33 - 136 U/L    Total Protein 6.9 6.4 - 8.2 g/dL    AST 14 9 - 39 U/L    Bilirubin, Total 0.4 0.0 - 1.2 mg/dL    ALT 15 7 - 45 U/L   Lactate   Result Value Ref Range    Lactate 1.2 0.4 - 2.0 mmol/L   CBC   Result Value Ref Range    WBC 14.6 (H) 4.4 - 11.3 x10*3/uL    nRBC 0.0 0.0 - 0.0 /100 WBCs    RBC 4.34 4.00 - 5.20 x10*6/uL    Hemoglobin 12.4 12.0 - 16.0 g/dL    Hematocrit 37.4 36.0 -  46.0 %    MCV 86 80 - 100 fL    MCH 28.6 26.0 - 34.0 pg    MCHC 33.2 32.0 - 36.0 g/dL    RDW 13.2 11.5 - 14.5 %    Platelets 297 150 - 450 x10*3/uL   Basic metabolic panel   Result Value Ref Range    Glucose 132 (H) 74 - 99 mg/dL    Sodium 136 136 - 145 mmol/L    Potassium 4.1 3.5 - 5.3 mmol/L    Chloride 103 98 - 107 mmol/L    Bicarbonate 23 21 - 32 mmol/L    Anion Gap 14 10 - 20 mmol/L    Urea Nitrogen 19 6 - 23 mg/dL    Creatinine 0.91 0.50 - 1.05 mg/dL    eGFR 66 >60 mL/min/1.73m*2    Calcium 9.1 8.6 - 10.3 mg/dL   POCT GLUCOSE   Result Value Ref Range    POCT Glucose 140 (H) 74 - 99 mg/dL   POCT GLUCOSE   Result Value Ref Range    POCT Glucose 188 (H) 74 - 99 mg/dL       Imaging Results  CT soft tissue neck w IV contrast    Result Date: 11/29/2023  Interpreted By:  Chi Sanchez, STUDY: CT SOFT TISSUE NECK W IV CONTRAST;  11/29/2023 4:14 pm   INDICATION: Signs/Symptoms:swelling left side of neck, difficulty swallowing.   COMPARISON: CT soft tissue neck 12/26/2019   ACCESSION NUMBER(S): [id number]   ORDERING CLINICIAN: LIGIA LONGORIA   TECHNIQUE: Axial CT images of the neck were obtained.  The patient received 75 cc Omnipaque 350 intravenous contrast agent. The images were reformatted in angled axial, coronal and sagittal planes.   FINDINGS: Oral Cavity, Pharynx and Larynx:  Evaluation oral cavity is limited by streak artifact from dental hardware.  The nasopharyngeal and oropharyngeal structures are unremarkable. The hypopharyngeal and laryngeal structures are unremarkable.   Retropharyngeal and Prevertebral Soft Tissues: Unremarkable.   Lymph nodes: There are few non specific bilateral neck nodes, probably reactive in etiology.   Neck vessels: Bilateral neck vessels are normal in course and caliber and appear patent.   Thyroid gland: Several low-attenuation nodules scattered throughout the thyroid gland with the largest measuring 8 mm.   Parotid and submandibular glands: Innumerable calcifications  scattered throughout bilateral parotid glands, more prominent on the left. There is asymmetric enlargement of the left parotid gland with diffuse soft tissue stranding and adjacent platysmal muscle thickening. Findings are concerning for acute sialoadenitis secondary to several sialoliths. No dilation of Stensen duct. Several calcification within the right parotid gland without acute inflammatory changes. Bilateral submandibular glands are unremarkable.   Paranasal Sinuses and Mastoids: Left maxillary sinus mucous retention cyst. Mild paranasal sinus mucosal thickening otherwise.   Visualized orbital structures are unremarkable. Status post right frontotemporal craniotomy.   Visualized upper lungs are clear.   Moderate to severe multilevel degenerative changes of the cervical spine most prominent at C4-C5.       Several bilateral parotid gland sialoliths, with heterogeneous enlargement, and surrounding soft tissue stranding involving the left parotid gland, compatible with acute sialoadenitis due to sialolithiasis. No Stensen duct dilation. No suspicious cervical adenopathy.     MACRO: None   Signed by: Chi Sanchez 11/29/2023 4:34 PM Dictation workstation:   [id number]      Medications:  amLODIPine, 2.5 mg, oral, Daily  aspirin, 81 mg, oral, Daily  bisoprolol, 10 mg, oral, Daily  ceFAZolin in dextrose (iso-os), , ,   ceFAZolin in dextrose (iso-os), 1 g, intravenous, q8h  cholecalciferol, 2,000 Units, oral, Daily  empagliflozin, 10 mg, oral, Daily  enoxaparin, 40 mg, subcutaneous, q24h  hydroCHLOROthiazide, 6.25 mg, oral, Daily  insulin lispro, 0-10 Units, subcutaneous, TID with meals  lisinopril, 40 mg, oral, Daily  [Held by provider] metFORMIN XR, 1,000 mg, oral, BID  polyethylene glycol, 17 g, oral, Daily  rosuvastatin, 20 mg, oral, Daily  vancomycin (Vancocin) 1.25 g in dextrose 5 % in water (D5W) 250 mL IV, 1,250 mg, intravenous, q24h       PRN medications: acetaminophen **OR** acetaminophen **OR**  acetaminophen, ceFAZolin in dextrose (iso-os), dextrose 10 % in water (D10W), dextrose, glucagon, HYDROmorphone, ondansetron **OR** ondansetron, oxyCODONE     Assessment/Plan   Principal Problem:    Sialoadenitis  -CBC Wbc 12.9  -CMP   -CT neck Several bilateral parotid gland sialoliths, with heterogeneous enlargement, and surrounding soft tissue stranding involving the left parotid gland, compatible with acute sialoadenitis due to sialolithiasis. No Stensen duct dilation. No suspicious cervical adenopathy  -pain control  -parotid massage q4h  -trend lab work  -ID consult changed abx   -Vancomycin ivpb daily  -Cefazolin ivpb daily  -ENT consult     DM/HTN  -Hold metformin due to contrast  -Continue jardiance  -continue all other meds  -insulin sliding scale  -accu check ac/hs     Labs/Testing reviewed    Interdisciplinary team rounding completed with hospitalist, nurse, TCC    NP discussed plan and lab/testing results with Dr. Malone    ENT consult pending  ID changed abx to cover staph    * 45 minutes total spent on patient's care today; >50% time spent on counseling/coordination of care    Mirella Ni, APRN-CNP

## 2023-11-30 NOTE — PROGRESS NOTES
Home with       11/30/23 0645   Current Planned Discharge Disposition   Current Planned Discharge Disposition Home

## 2023-11-30 NOTE — PROGRESS NOTES
Pharmacy Medication History Review    Tanya Fan is a 78 y.o. female admitted for Sialoadenitis. Pharmacy reviewed the patient's [id number] medications and allergies for accuracy.    The list below reflectives the updated PTA list. Please review each medication in order reconciliation for additional clarification and justification.  Prior to Admission Medications   Prescriptions Last Dose Informant Patient Reported? Taking?   Jardiance 10 mg 11/29/2023  No No   Sig: TAKE 1 TABLET BY MOUTH ONCE  DAILY (DO NOT START BEFORE  NOVEMBER 1, 2023)   amLODIPine (Norvasc) 2.5 mg tablet 11/29/2023  No No   Sig: Take 1 tablet (2.5 mg) by mouth once daily. Do not start before November 1, 2023.   aspirin 81 mg EC tablet 11/29/2023  Yes No   Sig: Take 1 tablet (81 mg) by mouth once daily.   b complex vitamins capsule 11/29/2023  Yes No   Sig: Take 1 capsule by mouth once daily.   bisoproloL-hydrochlorothiazide (Ziac) 10-6.25 mg tablet 11/29/2023  No No   Sig: TAKE 1 AND 1/2 TABLETS BY MOUTH  DAILY WITH BREAKFAST Do not start before November 1, 2023.   cholecalciferol (Vitamin D-3) 50 mcg (2,000 unit) capsule 11/29/2023  Yes No   Sig: Take by mouth early in the morning.. Take daily as directed   clindamycin (Cleocin) 300 mg capsule   No No   Sig: Take 1 capsule (300 mg) by mouth 3 times a day for 10 days.   estradiol (Estrace) 0.01 % (0.1 mg/gram) vaginal cream   Yes No   Sig: Insert into the vagina once daily. Apply 1 applicator full daily to the external area in and around the vagina   ibuprofen (AdviL) 200 mg tablet   Yes No   Sig: Take 1 tablet (200 mg) by mouth. Take 3-4 times daily as needed   lisinopril 40 mg tablet 11/29/2023  No No   Sig: Take 1 tablet (40 mg) by mouth once daily. Do not start before November 1, 2023.   metFORMIN XR (Glucophage-XR) 500 mg 24 hr tablet 11/29/2023  No No   Sig: TAKE 2 TABLETS BY MOUTH TWICE  DAILY   rosuvastatin (Crestor) 20 mg tablet 11/29/2023  Yes No   Sig: Take 1 tablet (20  mg) by mouth once daily.   sulfamethoxazole-trimethoprim (Bactrim) 400-80 mg tablet 11/29/2023  No No   Sig: Take 1 tablet by mouth every other day.      Facility-Administered Medications: None           The list below reflectives the updated allergy list. Please review each documented allergy for additional clarification and justification.  Allergies  Reviewed by Rama May RN on 11/29/2023        Severity Reactions Comments    Amoxicillin-pot Clavulanate Not Specified Unknown     Doxycycline Not Specified Unknown     Aspartame Low GI bleeding, Unknown Bleeding in colon            Below are additional concerns with the patient's PTA list.  Patient verified all medications and doses.    Quita Conway CPhT

## 2023-11-30 NOTE — CARE PLAN
Problem: Diabetes  Goal: Achieve decreasing blood glucose levels by end of shift  11/30/2023 1511 by Edilma Linares RN  Outcome: Progressing  11/30/2023 1511 by Edilma Linares RN  Outcome: Progressing  11/30/2023 1510 by Edilma Linares RN  Outcome: Progressing  Goal: Increase stability of blood glucose readings by end of shift  11/30/2023 1511 by Edilma Linares RN  Outcome: Progressing  11/30/2023 1511 by Edilma Linares RN  Outcome: Progressing  11/30/2023 1510 by Edilma Linares RN  Outcome: Progressing  Goal: Decrease in ketones present in urine by end of shift  11/30/2023 1511 by Edilma Linares RN  Outcome: Progressing  11/30/2023 1511 by Edilma Linares RN  Outcome: Progressing  11/30/2023 1510 by Edilma Linares RN  Outcome: Progressing  Goal: Maintain electrolyte levels within acceptable range throughout shift  11/30/2023 1511 by Edilma Linares RN  Outcome: Progressing  11/30/2023 1511 by Edilma Linares RN  Outcome: Progressing  11/30/2023 1510 by Edilma Linares RN  Outcome: Progressing  Goal: Maintain glucose levels >70mg/dl to <250mg/dl throughout shift  11/30/2023 1511 by Edilma Linares RN  Outcome: Progressing  11/30/2023 1511 by Edilma Linares RN  Outcome: Progressing  11/30/2023 1510 by Edilma Linares RN  Outcome: Progressing  Goal: No changes in neurological exam by end of shift  11/30/2023 1511 by Edilma Linares RN  Outcome: Progressing  11/30/2023 1511 by Edilma Linares RN  Outcome: Progressing  11/30/2023 1510 by Edilma Linares RN  Outcome: Progressing  Goal: Learn about and adhere to nutrition recommendations by end of shift  11/30/2023 1511 by Edilma Linares RN  Outcome: Progressing  11/30/2023 1511 by Edilma Linares RN  Outcome: Progressing  11/30/2023 1510 by Edilma Linares RN  Outcome: Progressing  Goal: Vital signs within normal range for age by end of shift  11/30/2023 1511 by Edilma Linares RN  Outcome: Progressing  11/30/2023 1511 by Edilma Linares RN  Outcome: Progressing  11/30/2023 1510 by  Edilma Linares RN  Outcome: Progressing  Goal: Increase self care and/or family involovement by end of shift  11/30/2023 1511 by Edilma Linares RN  Outcome: Progressing  11/30/2023 1511 by Edilma Linares RN  Outcome: Progressing  11/30/2023 1510 by Edilma Linares RN  Outcome: Progressing  Goal: Receive DSME education by end of shift  11/30/2023 1511 by Edilma Linares RN  Outcome: Progressing  11/30/2023 1511 by Edilma Linares RN  Outcome: Progressing  11/30/2023 1510 by Edilma Linares RN  Outcome: Progressing     Problem: Pain - Adult  Goal: Verbalizes/displays adequate comfort level or baseline comfort level  11/30/2023 1511 by Edilma Linares RN  Outcome: Progressing  11/30/2023 1511 by Edilma Linares RN  Outcome: Progressing  11/30/2023 1510 by Edilma Linares RN  Outcome: Progressing     Problem: Safety - Adult  Goal: Free from fall injury  11/30/2023 1511 by Edilma Linares RN  Outcome: Progressing  11/30/2023 1511 by Edilma Linares RN  Outcome: Progressing  11/30/2023 1510 by Edilma Linares RN  Outcome: Progressing     Problem: Discharge Planning  Goal: Discharge to home or other facility with appropriate resources  11/30/2023 1511 by Edilma Linares RN  Outcome: Progressing  11/30/2023 1511 by Edilma Linares RN  Outcome: Progressing  11/30/2023 1510 by Edilma Linares RN  Outcome: Progressing     Problem: Chronic Conditions and Co-morbidities  Goal: Patient's chronic conditions and co-morbidity symptoms are monitored and maintained or improved  11/30/2023 1511 by Edilma Linares RN  Outcome: Progressing  11/30/2023 1511 by Edilma Linares RN  Outcome: Progressing  11/30/2023 1510 by Edilma Linares RN  Outcome: Progressing   The patient's goals for the shift include      The clinical goals for the shift include to clear infection and return home

## 2023-11-30 NOTE — CONSULTS
Reason For Consult  Concern for parotitis    History Of Present Illness  Tanya Fan is a 78 y.o. female presenting with 4 days of left sided neck swelling and pain for which she was prescribed Clindamycin and prednisone initially and had taken 3 doses of the abx, however, she reports the pain got acutely worse overnight. Notably, she also reports a remote history of salivary gland swelling subsequent to pink eye, which was aspirated by an ENT. Currently, she endorses left neck pain and odynophagia, but denies drainage within the mouth, fevers, or changes in vision or hearing.     A CT neck was obtained which showed multiple stones in bilateral parotid glands left > right.      Past Medical History  She has a past medical history of Acute pharyngitis, unspecified (05/07/2016), Acute pharyngitis, unspecified (05/07/2016), Acute upper respiratory infection, unspecified (06/02/2016), Contusion of unspecified knee, initial encounter (07/30/2015), Dysuria (04/09/2015), Encounter for full-term uncomplicated delivery, Other disorders of nervous system (04/04/2014), Personal history of other diseases of the circulatory system, Personal history of other diseases of the respiratory system (05/12/2016), Personal history of pneumonia (recurrent) (02/14/2020), Personal history of urinary (tract) infections (04/13/2015), Recurrent UTI (04/04/2023), Unspecified conjunctivitis (05/27/2016), and Varicella without complication.    Surgical History  She has a past surgical history that includes Other surgical history (11/14/2013); Bladder surgery (11/14/2013); Hysterectomy (11/14/2013); Foot surgery (11/14/2013); Other surgical history (07/25/2019); Other surgical history (07/25/2019); Other surgical history (07/25/2019); Hand surgery (12/13/2013); MR angio head wo IV contrast (6/27/2019); and MR angio neck wo IV contrast (6/27/2019).     Social History  She reports that she has never smoked. She has never used smokeless tobacco.  "She reports that she does not drink alcohol and does not use drugs.    Family History  Family History   Problem Relation Name Age of Onset    Stroke Father      Diabetes Other Family     Hypertension Other Family         Allergies  Amoxicillin-pot clavulanate, Doxycycline, and Aspartame    Review of Systems  Negative except as above in HPI.      Physical Exam  PHYSICAL EXAMINATION:  Constitutional:  No acute distress  Voice:  No hoarseness or other abnormality  Respiration:  Breathing comfortably, no stridor  Cardiovascular:  No clubbing/cyanosis/edema in hands  Eyes:  EOM intact, sclera normal  Neuro:  Alert and oriented times 3, Cranial nerves II-XII grossly intact and symmetric bilaterally  Head and Face:  Symmetric facial features, no masses or lesions   Salivary Glands:  Erythema and edema overlying area of left parotid gland, tender to palpation; clear saliva expressed intra-orally upon gentle massage  Right Ear:  Normal external ear, normal hearing to whispered voice.  Left Ear: Normal external ear, normal hearing to whispered voice.  Nose:  External nose midline  Oral Cavity/Oropharynx/Lips:  Normal mucous membranes, no obvious masses or lesions  Neck/Lymph:  No LAD, no thyroid masses, trachea midline  Skin:  Neck skin is without scar or injury except for erythema noted overlying parotid   Psych:  Alert and oriented with appropriate mood and affect       Last Recorded Vitals  Blood pressure 101/58, pulse 62, temperature 36.4 °C (97.5 °F), temperature source Temporal, resp. rate 18, height 1.626 m (5' 4\"), weight 74.8 kg (165 lb), SpO2 95 %.    Relevant Results  CT soft tissue neck w IV contrast  Result Date: 11/29/2023  Parotid and submandibular glands: Innumerable calcifications scattered throughout bilateral parotid glands, more prominent on the left. There is asymmetric enlargement of the left parotid gland with diffuse soft tissue stranding and adjacent platysmal muscle thickening. Findings are " concerning for acute sialoadenitis secondary to several sialoliths. No dilation of Stensen duct. Several calcification within the right parotid gland without acute inflammatory changes. Bilateral submandibular glands are unremarkable.        Several bilateral parotid gland sialoliths, with heterogeneous enlargement, and surrounding soft tissue stranding involving the left parotid gland, compatible with acute sialoadenitis due to sialolithiasis. No Stensen duct dilation. No suspicious cervical adenopathy.           Assessment/Plan   Ms. Fan is a 77 yo female who presented to the ED with left sided neck pain and swelling, ENT was consulted for concern for parotitis. CT shows evidence of bilateral stones within the parotid glands, particularly in the left where it is associated with enlargement and fat stranding, however there is currently no appreciable collection amenable to drainage. No appreciable stone in Stensen's duct and clear saliva without purulence expressed upon palpation. Constellation of symptoms compatible with parotitis without hussain abscess or significant obstruction of Stensen's duct.     Recommendations:  - No acute ENT intervention warranted at this time   - Agree with antibiotics and pain control   - Antibiotics per ID   - Gentle massage of the area to encourage expression of saliva, sialagogues including lemon water, warm compresses, and hydration      - If no clinical improvement, recommend consult to interventional radiology for ultrasound and potentially image guided aspiration     Patient seen and discussed with attending Dr. Pabon who agrees with plan.

## 2023-11-30 NOTE — PROGRESS NOTES
11/30/23 0634   [facility] Disability Status   Are you deaf or do you have serious difficulty hearing? N   Are you blind or do you have serious difficulty seeing, even when wearing glasses? N   Because of a physical, mental, or emotional condition, do you have serious difficulty concentrating, remembering, or making decisions? (5 years old or older) N   Do you have serious difficulty walking or climbing stairs? N   Do you have serious difficulty dressing or bathing? N   Because of a physical, mental, or emotional condition, do you have serious difficulty doing errands alone such as visiting the doctor? N

## 2023-11-30 NOTE — PROGRESS NOTES
Transitional Care Coordination Progress Note:  Plan per Medical/Surgical team: treatment of left neck sialoadenitis (with parietal stones) with IV ATB, IV flagyl, oxy, Percocet  Status:observation  Payor source: [facility]  Discharge disposition: home with   Potential Barriers: failed outpatient ATB therapy  ADOD: 12/1/2023  LEON Motley RN, BSN Transitional Care Coordinator ED# [phone]      11/30/23 0634   Discharge Planning   Living Arrangements Spouse/significant other   Support Systems Spouse/significant other   Assistance Needed ENT follow-up outpatient   Type of Residence Private residence   Number of Stairs to Enter Residence 2   Number of Stairs Within Residence 8   Home or Post Acute Services None   Patient expects to be discharged to: home with    Does the patient need discharge transport arranged? Yes   RoundTrip coordination needed? Yes   Has discharge transport been arranged? No   Financial Resource Strain   How hard is it for you to pay for the very basics like food, housing, medical care, and heating? Not hard   Housing Stability   In the last 12 months, was there a time when you were not able to pay the mortgage or rent on time? N   In the last 12 months, how many places have you lived? 1   In the last 12 months, was there a time when you did not have a steady place to sleep or slept in a shelter (including now)? N   Transportation Needs   In the past 12 months, has lack of transportation kept you from medical appointments or from getting medications? no   In the past 12 months, has lack of transportation kept you from meetings, work, or from getting things needed for daily living? No

## 2023-11-30 NOTE — PROGRESS NOTES
11/30/23 0639   Physical Activity   On average, how many days per week do you engage in moderate to strenuous exercise (like a brisk walk)? 0 days   On average, how many minutes do you engage in exercise at this level? 0 min   Financial Resource Strain   How hard is it for you to pay for the very basics like food, housing, medical care, and heating? Not hard   Housing Stability   In the last 12 months, was there a time when you were not able to pay the mortgage or rent on time? N   In the last 12 months, how many places have you lived? 1   In the last 12 months, was there a time when you did not have a steady place to sleep or slept in a shelter (including now)? N   Transportation Needs   In the past 12 months, has lack of transportation kept you from medical appointments or from getting medications? no   In the past 12 months, has lack of transportation kept you from meetings, work, or from getting things needed for daily living? No   Food Insecurity   Within the past 12 months, you worried that your food would run out before you got the money to buy more. Never true   Within the past 12 months, the food you bought just didn't last and you didn't have money to get more. Never true   Stress   Do you feel stress - tense, restless, nervous, or anxious, or unable to sleep at night because your mind is troubled all the time - these days? Not at all   Social Connections   In a typical week, how many times do you talk on the phone with family, friends, or neighbors? More than 3   How often do you get together with friends or relatives? More than 3   How often do you attend Latter day or Amish services? Never   Do you belong to any clubs or organizations such as Latter day groups, unions, fraternal or athletic groups, or school groups? No   How often do you attend meetings of the clubs or organizations you belong to? Never   Are you , , , , never , or living with a partner?     Intimate Partner Violence   Within the last year, have you been afraid of your partner or ex-partner? No   Within the last year, have you been humiliated or emotionally abused in other ways by your partner or ex-partner? No   Within the last year, have you been kicked, hit, slapped, or otherwise physically hurt by your partner or ex-partner? No   Within the last year, have you been raped or forced to have any kind of sexual activity by your partner or ex-partner? No   Alcohol Use   Q1: How often do you have a drink containing alcohol? Never   Q2: How many drinks containing alcohol do you have on a typical day when you are drinking? None   Q3: How often do you have six or more drinks on one occasion? Never   Utilities   In the past 12 months has the electric, gas, oil, or water company threatened to shut off services in your home? No

## 2023-11-30 NOTE — CONSULTS
"INFECTIOUS DISEASE INPATIENT INITIAL CONSULTATION    Referred By: Lakesha Staton    Reason For Consult: parotitis    HPI:  This is a 78 y.o. female with PMH as below who presented with left facial swelling/pain.    Had COVID recently. Developed left parotitis - given Prednisone but not better. On PO Clindamycin for 1 day and not better so came here. CT is showing left parotitis with stones and no abscess. Is on IV Cefazolin and IV Flagyl now. WBC is up. No fever. Pain is significant and having a hard time eating.     Past Medical History:  She has a past medical history of Acute pharyngitis, unspecified (05/07/2016), Acute pharyngitis, unspecified (05/07/2016), Acute upper respiratory infection, unspecified (06/02/2016), Contusion of unspecified knee, initial encounter (07/30/2015), Dysuria (04/09/2015), Encounter for full-term uncomplicated delivery, Other disorders of nervous system (04/04/2014), Personal history of other diseases of the circulatory system, Personal history of other diseases of the respiratory system (05/12/2016), Personal history of pneumonia (recurrent) (02/14/2020), Personal history of urinary (tract) infections (04/13/2015), Recurrent UTI (04/04/2023), Unspecified conjunctivitis (05/27/2016), and Varicella without complication.    Surgical History:  She has a past surgical history that includes Other surgical history (11/14/2013); Bladder surgery (11/14/2013); Hysterectomy (11/14/2013); Foot surgery (11/14/2013); Other surgical history (07/25/2019); Other surgical history (07/25/2019); Other surgical history (07/25/2019); Hand surgery (12/13/2013); MR angio head wo IV contrast (6/27/2019); and MR angio neck wo IV contrast (6/27/2019).    Allergies:  Amoxicillin-pot clavulanate, Doxycycline, and Aspartame     Vitals (Last 24 Hours):  Heart Rate:  []   Temp:  [36.9 °C (98.5 °F)]   Resp:  [16-19]   BP: (105-128)/(55-90)   Height:  [162.6 cm (5' 4\")]   Weight:  [74.8 kg (165 lb)]   SpO2:  " [95 %-99 %]      PHYSICAL EXAM:  Gen - NAD  Face - left face with swollen parotid gland and redness/pain/swelling/warmth  Heart - RRR  Lungs - clear bilaterally, no wheezing  Abd - soft, no ttp, BS present  Skin - no rash    MEDS:    Current Facility-Administered Medications:     acetaminophen (Tylenol) tablet 650 mg, 650 mg, oral, q4h PRN, 650 mg at 11/30/23 0932 **OR** acetaminophen (Tylenol) oral liquid 650 mg, 650 mg, oral, q4h PRN **OR** acetaminophen (Tylenol) suppository 650 mg, 650 mg, rectal, q4h PRN, TODD Pino    amLODIPine (Norvasc) tablet 2.5 mg, 2.5 mg, oral, Daily, TODD Pino, 2.5 mg at 11/30/23 0935    aspirin EC tablet 81 mg, 81 mg, oral, Daily, TODD Pino, 81 mg at 11/30/23 0932    bisoprolol (Zebeta) tablet 10 mg, 10 mg, oral, Daily, Mary Jane Sultana PharmD, 10 mg at 11/30/23 1003    ceFAZolin in dextrose (iso-os) (Ancef) IVPB  - Omnicell Lorenaide Hardy, , , ,     ceFAZolin in dextrose (iso-os) (Ancef) IVPB 1 g, 1 g, intravenous, q8h, TODD Pino, Last Rate: 100 mL/hr at 11/30/23 1201, 1 g at 11/30/23 1201    cholecalciferol (Vitamin D-3) tablet 2,000 Units, 2,000 Units, oral, Daily, TODD Pino    dextrose 10 % in water (D10W) infusion, 0.3 g/kg/hr, intravenous, Once PRN, TODD Pino    dextrose 50 % injection 25 g, 25 g, intravenous, q15 min PRN, TODD Pino    empagliflozin (Jardiance) tablet 10 mg, 10 mg, oral, Daily, TODD Pino, 10 mg at 11/30/23 0936    glucagon (Glucagen) injection 1 mg, 1 mg, intramuscular, q15 min PRN, Mirella Ni, APRN-CNP    hydroCHLOROthiazide (HYDRODiuril) tablet 6.25 mg, 6.25 mg, oral, Daily, Frankie RojoD, 6.25 mg at 11/30/23 0932    HYDROmorphone (Dilaudid) injection 0.4 mg, 0.4 mg, intravenous, q3h PRN, Macy Brady MD, 0.4 mg at 11/30/23 1004    insulin lispro (HumaLOG) injection 0-10 Units, 0-10 Units, subcutaneous, TID  with meals, Mirella Silva TODD Ni, 2 Units at 11/30/23 1200    lisinopril tablet 40 mg, 40 mg, oral, Daily, Mirella Briggsinski, APRN-CNP, 40 mg at 11/30/23 0934    [Held by provider] metFORMIN XR (Glucophage-XR) 24 hr tablet 1,000 mg, 1,000 mg, oral, BID, TODD Pino    ondansetron (Zofran) tablet 4 mg, 4 mg, oral, q8h PRN **OR** ondansetron (Zofran) injection 4 mg, 4 mg, intravenous, q8h PRN, TODD Pino    oxyCODONE (Roxicodone) immediate release tablet 5 mg, 5 mg, oral, q6h PRN, TODD Pino    polyethylene glycol (Glycolax, Miralax) packet 17 g, 17 g, oral, Daily, Jojo Wolinski, APRN-CNP, 17 g at 11/30/23 0931    rosuvastatin (Crestor) tablet 20 mg, 20 mg, oral, Daily, JojoTODD Wies, 20 mg at 11/30/23 0935    vancomycin (Vancocin) 1.25 g in dextrose 5 % in water (D5W) 250 mL IV, 1,250 mg, intravenous, q24h, Niyah Goff, PharmD    Current Outpatient Medications:     amLODIPine (Norvasc) 2.5 mg tablet, Take 1 tablet (2.5 mg) by mouth once daily. Do not start before November 1, 2023., Disp: 90 tablet, Rfl: 1    aspirin 81 mg EC tablet, Take 1 tablet (81 mg) by mouth once daily., Disp: , Rfl:     b complex vitamins capsule, Take 1 capsule by mouth once daily., Disp: , Rfl:     bisoproloL-hydrochlorothiazide (Ziac) 10-6.25 mg tablet, TAKE 1 AND 1/2 TABLETS BY MOUTH  DAILY WITH BREAKFAST Do not start before November 1, 2023., Disp: 150 tablet, Rfl: 2    cholecalciferol (Vitamin D-3) 50 mcg (2,000 unit) capsule, Take by mouth early in the morning.. Take daily as directed, Disp: , Rfl:     clindamycin (Cleocin) 300 mg capsule, Take 1 capsule (300 mg) by mouth 3 times a day for 10 days., Disp: 30 capsule, Rfl: 0    estradiol (Estrace) 0.01 % (0.1 mg/gram) vaginal cream, Insert into the vagina once daily. Apply 1 applicator full daily to the external area in and around the vagina, Disp: , Rfl:     ibuprofen (AdviL) 200 mg tablet, Take 1 tablet (200 mg)  by mouth. Take 3-4 times daily as needed, Disp: , Rfl:     Jardiance 10 mg, TAKE 1 TABLET BY MOUTH ONCE  DAILY (DO NOT START BEFORE  NOVEMBER 1, 2023), Disp: 100 tablet, Rfl: 2    lisinopril 40 mg tablet, Take 1 tablet (40 mg) by mouth once daily. Do not start before November 1, 2023., Disp: 90 tablet, Rfl: 1    metFORMIN XR (Glucophage-XR) 500 mg 24 hr tablet, TAKE 2 TABLETS BY MOUTH TWICE  DAILY, Disp: 400 tablet, Rfl: 2    rosuvastatin (Crestor) 20 mg tablet, Take 1 tablet (20 mg) by mouth once daily., Disp: , Rfl:     sulfamethoxazole-trimethoprim (Bactrim) 400-80 mg tablet, Take 1 tablet by mouth every other day., Disp: 45 tablet, Rfl: 3     LABS:  Lab Results   Component Value Date    WBC 14.6 (H) 11/30/2023    HGB 12.4 11/30/2023    HCT 37.4 11/30/2023    MCV 86 11/30/2023     11/30/2023      Results from last 72 hours   Lab Units 11/30/23  0449   SODIUM mmol/L 136   POTASSIUM mmol/L 4.1   CHLORIDE mmol/L 103   CO2 mmol/L 23   BUN mg/dL 19   CREATININE mg/dL 0.91   GLUCOSE mg/dL 132*   CALCIUM mg/dL 9.1   ANION GAP mmol/L 14   EGFR mL/min/1.73m*2 66     Results from last 72 hours   Lab Units 11/29/23  1443   ALK PHOS U/L 67   BILIRUBIN TOTAL mg/dL 0.4   PROTEIN TOTAL g/dL 6.9   ALT U/L 15   AST U/L 14   ALBUMIN g/dL 4.2     Estimated Creatinine Clearance: 52.1 mL/min (by C-G formula based on SCr of 0.91 mg/dL).       IMAGING:  CT Face 11/29  Impression:     Several bilateral parotid gland sialoliths, with heterogeneous  enlargement, and surrounding soft tissue stranding involving the left  parotid gland, compatible with acute sialoadenitis due to  sialolithiasis. No Stensen duct dilation. No suspicious cervical  adenopathy.       ASSESSMENT/PLAN:    Left Parotitis with Stones - swelling/pain might just be from clogged ducts/stones although infection might be playing a role. WBC could be from infection vs related to recent prednisone use. Tends to be Staph infection, could be MRSA.  Abx Allergies -  limits abx choices    IV Vancomycin ordered. Continue IV Cefazolin. Stopped Flagyl.    Monitoring for adverse effects of abx such as rash/itching/diarrhea.    Will follow. Thanks!    Lopez Geller MD  [facility]  Office #[phone]

## 2023-12-01 ENCOUNTER — APPOINTMENT (OUTPATIENT)
Dept: RADIOLOGY | Facility: HOSPITAL | Age: 78
DRG: 156 | End: 2023-12-01
Payer: MEDICARE

## 2023-12-01 LAB
ALBUMIN SERPL BCP-MCNC: 3.6 G/DL (ref 3.4–5)
ANION GAP SERPL CALC-SCNC: 15 MMOL/L (ref 10–20)
BASOPHILS # BLD AUTO: 0.06 X10*3/UL (ref 0–0.1)
BASOPHILS NFR BLD AUTO: 0.4 %
BUN SERPL-MCNC: 18 MG/DL (ref 6–23)
CALCIUM SERPL-MCNC: 9.2 MG/DL (ref 8.6–10.3)
CHLORIDE SERPL-SCNC: 99 MMOL/L (ref 98–107)
CO2 SERPL-SCNC: 23 MMOL/L (ref 21–32)
CREAT SERPL-MCNC: 0.91 MG/DL (ref 0.5–1.05)
EOSINOPHIL # BLD AUTO: 0.18 X10*3/UL (ref 0–0.4)
EOSINOPHIL NFR BLD AUTO: 1.3 %
ERYTHROCYTE [DISTWIDTH] IN BLOOD BY AUTOMATED COUNT: 13.2 % (ref 11.5–14.5)
GFR SERPL CREATININE-BSD FRML MDRD: 66 ML/MIN/1.73M*2
GLUCOSE BLD MANUAL STRIP-MCNC: 173 MG/DL (ref 74–99)
GLUCOSE BLD MANUAL STRIP-MCNC: 175 MG/DL (ref 74–99)
GLUCOSE BLD MANUAL STRIP-MCNC: 232 MG/DL (ref 74–99)
GLUCOSE SERPL-MCNC: 187 MG/DL (ref 74–99)
HCT VFR BLD AUTO: 37.4 % (ref 36–46)
HGB BLD-MCNC: 11.9 G/DL (ref 12–16)
IMM GRANULOCYTES # BLD AUTO: 0.05 X10*3/UL (ref 0–0.5)
IMM GRANULOCYTES NFR BLD AUTO: 0.3 % (ref 0–0.9)
LYMPHOCYTES # BLD AUTO: 2.49 X10*3/UL (ref 0.8–3)
LYMPHOCYTES NFR BLD AUTO: 17.4 %
MCH RBC QN AUTO: 27.6 PG (ref 26–34)
MCHC RBC AUTO-ENTMCNC: 31.8 G/DL (ref 32–36)
MCV RBC AUTO: 87 FL (ref 80–100)
MONOCYTES # BLD AUTO: 1.03 X10*3/UL (ref 0.05–0.8)
MONOCYTES NFR BLD AUTO: 7.2 %
NEUTROPHILS # BLD AUTO: 10.54 X10*3/UL (ref 1.6–5.5)
NEUTROPHILS NFR BLD AUTO: 73.4 %
NRBC BLD-RTO: 0 /100 WBCS (ref 0–0)
PHOSPHATE SERPL-MCNC: 3 MG/DL (ref 2.5–4.9)
PLATELET # BLD AUTO: 297 X10*3/UL (ref 150–450)
POTASSIUM SERPL-SCNC: 3.9 MMOL/L (ref 3.5–5.3)
RBC # BLD AUTO: 4.31 X10*6/UL (ref 4–5.2)
SODIUM SERPL-SCNC: 133 MMOL/L (ref 136–145)
VANCOMYCIN SERPL-MCNC: 8.5 UG/ML (ref 5–20)
WBC # BLD AUTO: 14.4 X10*3/UL (ref 4.4–11.3)

## 2023-12-01 PROCEDURE — G0378 HOSPITAL OBSERVATION PER HR: HCPCS

## 2023-12-01 PROCEDURE — 2500000004 HC RX 250 GENERAL PHARMACY W/ HCPCS (ALT 636 FOR OP/ED): Performed by: NURSE PRACTITIONER

## 2023-12-01 PROCEDURE — 76536 US EXAM OF HEAD AND NECK: CPT | Mod: REDUCED SERVICES | Performed by: RADIOLOGY

## 2023-12-01 PROCEDURE — 2500000004 HC RX 250 GENERAL PHARMACY W/ HCPCS (ALT 636 FOR OP/ED): Performed by: INTERNAL MEDICINE

## 2023-12-01 PROCEDURE — 36415 COLL VENOUS BLD VENIPUNCTURE: CPT | Performed by: PHARMACIST

## 2023-12-01 PROCEDURE — 82947 ASSAY GLUCOSE BLOOD QUANT: CPT

## 2023-12-01 PROCEDURE — 2500000001 HC RX 250 WO HCPCS SELF ADMINISTERED DRUGS (ALT 637 FOR MEDICARE OP): Performed by: NURSE PRACTITIONER

## 2023-12-01 PROCEDURE — 80069 RENAL FUNCTION PANEL: CPT | Performed by: PHARMACIST

## 2023-12-01 PROCEDURE — 99233 SBSQ HOSP IP/OBS HIGH 50: CPT | Performed by: NURSE PRACTITIONER

## 2023-12-01 PROCEDURE — 36415 COLL VENOUS BLD VENIPUNCTURE: CPT | Performed by: NURSE PRACTITIONER

## 2023-12-01 PROCEDURE — 76536 US EXAM OF HEAD AND NECK: CPT | Mod: 52

## 2023-12-01 PROCEDURE — 80202 ASSAY OF VANCOMYCIN: CPT | Performed by: PHARMACIST

## 2023-12-01 PROCEDURE — 2500000004 HC RX 250 GENERAL PHARMACY W/ HCPCS (ALT 636 FOR OP/ED): Performed by: PHARMACIST

## 2023-12-01 PROCEDURE — 2500000004 HC RX 250 GENERAL PHARMACY W/ HCPCS (ALT 636 FOR OP/ED): Performed by: STUDENT IN AN ORGANIZED HEALTH CARE EDUCATION/TRAINING PROGRAM

## 2023-12-01 PROCEDURE — 85025 COMPLETE CBC W/AUTO DIFF WBC: CPT | Performed by: NURSE PRACTITIONER

## 2023-12-01 RX ORDER — PANTOPRAZOLE SODIUM 40 MG/1
40 TABLET, DELAYED RELEASE ORAL
Status: DISCONTINUED | OUTPATIENT
Start: 2023-12-02 | End: 2023-12-03 | Stop reason: HOSPADM

## 2023-12-01 RX ADMIN — VANCOMYCIN HYDROCHLORIDE 1.25 G: 10 INJECTION, POWDER, LYOPHILIZED, FOR SOLUTION INTRAVENOUS at 14:14

## 2023-12-01 RX ADMIN — CHOLECALCIFEROL TAB 25 MCG (1000 UNIT) 2000 UNITS: 25 TAB at 05:45

## 2023-12-01 RX ADMIN — HYDROMORPHONE HYDROCHLORIDE 0.4 MG: 1 INJECTION, SOLUTION INTRAMUSCULAR; INTRAVENOUS; SUBCUTANEOUS at 06:52

## 2023-12-01 RX ADMIN — ROSUVASTATIN CALCIUM 20 MG: 20 TABLET, FILM COATED ORAL at 08:41

## 2023-12-01 RX ADMIN — HYDROMORPHONE HYDROCHLORIDE 0.4 MG: 1 INJECTION, SOLUTION INTRAMUSCULAR; INTRAVENOUS; SUBCUTANEOUS at 10:36

## 2023-12-01 RX ADMIN — CEFAZOLIN SODIUM 1 G: 1 INJECTION, SOLUTION INTRAVENOUS at 03:47

## 2023-12-01 RX ADMIN — ASPIRIN 81 MG: 81 TABLET, COATED ORAL at 08:41

## 2023-12-01 RX ADMIN — EMPAGLIFLOZIN 10 MG: 10 TABLET, FILM COATED ORAL at 08:41

## 2023-12-01 RX ADMIN — DEXAMETHASONE 10 MG: 6 TABLET ORAL at 22:07

## 2023-12-01 RX ADMIN — HYDROMORPHONE HYDROCHLORIDE 0.4 MG: 1 INJECTION, SOLUTION INTRAMUSCULAR; INTRAVENOUS; SUBCUTANEOUS at 02:51

## 2023-12-01 RX ADMIN — CEFAZOLIN SODIUM 1 G: 1 INJECTION, SOLUTION INTRAVENOUS at 20:30

## 2023-12-01 RX ADMIN — CEFAZOLIN SODIUM 1 G: 1 INJECTION, SOLUTION INTRAVENOUS at 13:03

## 2023-12-01 RX ADMIN — POLYETHYLENE GLYCOL 3350 17 G: 17 POWDER, FOR SOLUTION ORAL at 08:41

## 2023-12-01 ASSESSMENT — COGNITIVE AND FUNCTIONAL STATUS - GENERAL
DAILY ACTIVITIY SCORE: 24
MOBILITY SCORE: 24

## 2023-12-01 ASSESSMENT — PAIN - FUNCTIONAL ASSESSMENT
PAIN_FUNCTIONAL_ASSESSMENT: 0-10

## 2023-12-01 ASSESSMENT — PAIN DESCRIPTION - LOCATION
LOCATION: NECK
LOCATION: NECK

## 2023-12-01 ASSESSMENT — PAIN SCALES - PAIN ASSESSMENT IN ADVANCED DEMENTIA (PAINAD): BREATHING: NORMAL

## 2023-12-01 ASSESSMENT — PAIN SCALES - WONG BAKER
WONGBAKER_NUMERICALRESPONSE: NO HURT
WONGBAKER_NUMERICALRESPONSE: HURTS WORST

## 2023-12-01 ASSESSMENT — PAIN SCALES - GENERAL
PAINLEVEL_OUTOF10: 10 - WORST POSSIBLE PAIN
PAINLEVEL_OUTOF10: 10 - WORST POSSIBLE PAIN
PAINLEVEL_OUTOF10: 5 - MODERATE PAIN
PAINLEVEL_OUTOF10: 10 - WORST POSSIBLE PAIN

## 2023-12-01 ASSESSMENT — PAIN DESCRIPTION - ORIENTATION: ORIENTATION: LEFT

## 2023-12-01 NOTE — PROGRESS NOTES
Patient continues treatment of left neck sialoadenitis (with parietal stones) with IV ATB, Dilaudid, oxy, IR consulted. Seen by ENT & glands aspirated.  L Rody Motley RN, BSN Transitional Care Coordinator ED# [phone]

## 2023-12-01 NOTE — PROGRESS NOTES
Vancomycin Dosing by Pharmacy- FOLLOW UP    Tanya Fan is a 78 y.o. year old female who Pharmacy has been consulted for vancomycin dosing for cellulitis, skin and soft tissue. Based on the patient's indication and renal status this patient is being dosed based on a goal AUC of 400-600.     Renal function is currently stable.    Current vancomycin dose: 1250 mg given every 24 hours    Estimated vancomycin AUC on current dose: 452 mg/L.hr     Visit Vitals  BP 97/54 (BP Location: Left arm, Patient Position: Lying)   Pulse 67   Temp 36.8 °C (98.2 °F) (Temporal)   Resp 18        Lab Results   Component Value Date    CREATININE 0.91 12/01/2023    CREATININE 0.91 11/30/2023    CREATININE 1.03 11/29/2023    CREATININE 0.89 09/01/2023    CREATININE 0.81 02/22/2023    CREATININE 0.80 08/22/2022    CREATININE 0.92 11/05/2021      Assessment/Plan    Within goal AUC range. Continue current vancomycin regimen.    This dosing regimen is predicted by [employer] to result in the following pharmacokinetic parameters:  Regimen: 1250 mg IV every 24 hours.  Start time: 08:26 on 12/01/2023  Exposure target: AUC24 (range)400-600 mg/L.hr   AUC24,ss: 452 mg/L.hr  Probability of AUC24 > 400: 72 %  Ctrough,ss: 13.1 mg/L  Probability of Ctrough,ss > 20: 7 %  Probability of nephrotoxicity (Lodise ADRIÁN 2009): 8 %    The next level has been ordered with AM labs on 12./4. May be obtained sooner if clinically indicated.   Will continue to monitor renal function daily while on vancomycin and order serum creatinine at least every 48 hours if not already ordered.  Follow for continued vancomycin needs, clinical response, and signs/symptoms of toxicity.     iNyah Goff, PharmD

## 2023-12-01 NOTE — DISCHARGE INSTR - OTHER ORDERS
Gentle massage of the area to encourage expression of saliva, sialagogues including lemon water, warm compresses, and hydration

## 2023-12-01 NOTE — PRE-PROCEDURE NOTE
Interventional Radiology Preprocedure Note    Indication for procedure: The encounter diagnosis was Sialoadenitis.    Relevant review of systems:  Pain and swelling left neck    Relevant Labs:   Lab Results   Component Value Date    CREATININE 0.91 12/01/2023    EGFR 66 12/01/2023       Planned Sedation/Anesthesia: Minimal    Airway assessment: normal; left neck edema    Directed physical examination:    A&Ox3  Breathing Unlabored  RRR  Left neck with edema, warm, and red.     Mallampati: II (hard and soft palate, upper portion of tonsils anduvula visible)    ASA Score: ASA 2 - Patient with mild systemic disease with no functional limitations    Plan for US guided aspiration parotid gland/abscess today.     Benefits, risks and alternatives of procedure and planned sedation have been discussed with the patient and/or their representative. All questions answered and they agree to proceed.

## 2023-12-01 NOTE — POST-PROCEDURE NOTE
Interventional Radiology Brief Postprocedure Note    Attending: Shaquille Bliss MD     Assistant: none    Diagnosis: left sialadenitis    Description of procedure: US eval of the left parotid gland showed an expanded gland with sialoliths likely indicating sialadenitis. There is no collection or abscess and so no aspiration was performed. Dictation to follow.     Anesthesia:  NA    Complications: NA    Estimated Blood Loss: NA    Medications  As of 12/01/23 1632      oxyCODONE-acetaminophen (Percocet) 5-325 mg per tablet 1 tablet (tablet) Total dose:  1 tablet Dosing weight:  74.8      Date/Time Rate/Dose/Volume Action       11/29/23  1446 1 tablet Given               iohexol (OMNIPaque) 350 mg iodine/mL solution 75 mL (mL) Total volume:  75 mL Dosing weight:  74.8      Date/Time Rate/Dose/Volume Action       11/29/23  1603 75 mL Given               sodium chloride 0.9 % bolus 1,000 mL (mL/hr) Total volume:  Not documented* Dosing weight:  74.8   *Total volume has not been documented. View each administration to see the amount administered.     Date/Time Rate/Dose/Volume Action       11/29/23  1936 1,000 mL - 1,000 mL/hr (over 60 min) New Bag      2036  (over 60 min) Stopped               metroNIDAZOLE in NaCl (iso-os) (Flagyl)  mg (mg) Total dose:  1,500 mg Dosing weight:  74.8      Date/Time Rate/Dose/Volume Action       11/29/23  1936 500 mg (over 60 min) New Bag      2036  (over 60 min) Stopped     11/30/23  0353 500 mg (over 60 min) New Bag      0453  (over 60 min) Stopped      1113 500 mg (over 60 min) New Bag      1213  (over 60 min) Stopped               ceFAZolin in dextrose (iso-os) (Ancef) IVPB 2 g (g) Total dose:  2 g Dosing weight:  74.8      Date/Time Rate/Dose/Volume Action       11/29/23 1951 2 g (over 30 min) New Bag      2021  (over 30 min) Stopped               ceFAZolin in dextrose (iso-os) (Ancef) IVPB  - Omnicell Override Pull Total dose:  Cannot be calculated*   *Administration dose not  documented     Date/Time Rate/Dose/Volume Action       11/30/23  0350 *Not included in total Missed               amLODIPine (Norvasc) tablet 2.5 mg (mg) Total dose:  2.5 mg* Dosing weight:  74.8   *Administration not included in total     Date/Time Rate/Dose/Volume Action       11/30/23 0935 2.5 mg Given     12/01/23 0900 *2.5 mg Missed               aspirin EC tablet 81 mg (mg) Total dose:  162 mg      Date/Time Rate/Dose/Volume Action       11/30/23  0932 81 mg Given     12/01/23 0841 81 mg Given               cholecalciferol (Vitamin D-3) tablet 2,000 Units (Units) Total dose:  2,000 Units*   *Administration not included in total     Date/Time Rate/Dose/Volume Action       11/30/23 0600 *2,000 Units Missed     12/01/23 0545 2,000 Units Given               empagliflozin (Jardiance) tablet 10 mg (mg) Total dose:  20 mg      Date/Time Rate/Dose/Volume Action       11/30/23 0936 10 mg Given     12/01/23 0841 10 mg Given               lisinopril tablet 40 mg (mg) Total dose:  40 mg*   *Administration not included in total     Date/Time Rate/Dose/Volume Action       11/30/23  0934 40 mg Given     12/01/23 0900 *40 mg Missed               metFORMIN XR (Glucophage-XR) 24 hr tablet 1,000 mg (mg) Total dose:  Cannot be calculated*   *Administration dose not documented     Date/Time Rate/Dose/Volume Action       11/29/23  2105 *Not included in total Held by provider     11/30/23 0900 *Not included in total Automatically Held      2100 *Not included in total Automatically Held     12/01/23 0900 *1,000 mg Missed               rosuvastatin (Crestor) tablet 20 mg (mg) Total dose:  40 mg      Date/Time Rate/Dose/Volume Action       11/30/23 0935 20 mg Given     12/01/23 0841 20 mg Given               enoxaparin (Lovenox) syringe 40 mg (mg) Total dose:  0 mg* Dosing weight:  74.8   *Administration not included in total     Date/Time Rate/Dose/Volume Action       11/30/23  1436 *40 mg Missed     12/01/23  1415 *40 mg  Missed               acetaminophen (Tylenol) tablet 650 mg (mg) Total dose:  650 mg Dosing weight:  74.8      Date/Time Rate/Dose/Volume Action       11/30/23  0932 650 mg Given               acetaminophen (Tylenol) oral liquid 650 mg (mg) Total dose:  Cannot be calculated* Dosing weight:  74.8   *Administration dose not documented     Date/Time Rate/Dose/Volume Action       11/30/23 0932 *Not included in total See Alternative               acetaminophen (Tylenol) suppository 650 mg (mg) Total dose:  Cannot be calculated* Dosing weight:  74.8   *Administration dose not documented     Date/Time Rate/Dose/Volume Action       11/30/23 0932 *Not included in total See Alternative               polyethylene glycol (Glycolax, Miralax) packet 17 g (g) Total dose:  34 g Dosing weight:  74.8      Date/Time Rate/Dose/Volume Action       11/30/23  0931 17 g Given     12/01/23  0841 17 g Given               HYDROmorphone PF (Dilaudid) injection 0.2 mg (mg) Total dose:  0.2 mg Dosing weight:  74.8      Date/Time Rate/Dose/Volume Action       11/29/23 2149 0.2 mg Given               ceFAZolin in dextrose (iso-os) (Ancef) IVPB 1 g (mL/hr) Total volume:  Not documented* Dosing weight:  74.8   *Total volume has not been documented. View each administration to see the amount administered.     Date/Time Rate/Dose/Volume Action       11/30/23  0421 1 g - 100 mL/hr (over 30 min) New Bag      0451  (over 30 min) Stopped      1201 1 g - 100 mL/hr (over 30 min) New Bag      1231  (over 30 min) Stopped      2135 1 g - 100 mL/hr (over 30 min) New Bag      2205  (over 30 min) Stopped     12/01/23  0347 1 g - 100 mL/hr (over 30 min) New Bag      0417  (over 30 min) Stopped      1303 1 g - 100 mL/hr (over 30 min) New Bag      1333  (over 30 min) Stopped               insulin lispro (HumaLOG) injection 0-10 Units (Units) Total dose:  8 Units Dosing weight:  74.8      Date/Time Rate/Dose/Volume Action       11/30/23  0800 *Not included in total  Missed      1200 2 Units Given      1651 6 Units Given     12/01/23  0800 *Not included in total Missed      1200 *Not included in total Missed               bisoprolol (Zebeta) tablet 10 mg (mg) Total dose:  10 mg* Dosing weight:  74.8   *Administration not included in total     Date/Time Rate/Dose/Volume Action       11/30/23  1003 10 mg Given     12/01/23  0900 *10 mg Missed               hydroCHLOROthiazide (HYDRODiuril) tablet 6.25 mg (mg) Total dose:  6.25 mg* Dosing weight:  74.8   *Administration not included in total     Date/Time Rate/Dose/Volume Action       11/30/23  0932 6.25 mg Given     12/01/23  0900 *6.25 mg Missed               HYDROmorphone (Dilaudid) injection 0.4 mg (mg) Total dose:  3.2 mg Dosing weight:  74.8      Date/Time Rate/Dose/Volume Action       11/30/23  0053 0.4 mg Given      0654 0.4 mg Given      1004 0.4 mg Given      1655 0.4 mg Given      2134 0.4 mg Given     12/01/23  0251 0.4 mg Given      0652 0.4 mg Given      1036 0.4 mg Given               vancomycin (Vancocin) 1.25 g in dextrose 5 % in water (D5W) 250 mL IV (mL/hr) Total volume:  Not documented* Dosing weight:  74.8   *Total volume has not been documented. View each administration to see the amount administered.     Date/Time Rate/Dose/Volume Action       11/30/23  1436 1.25 g - 220 mL/hr (over 75 min) New Bag      1551  (over 75 min) Stopped     12/01/23  1414 1.25 g - 220 mL/hr (over 75 min) New Bag                   * Cannot find log *      See detailed result report with images in PACS.    The patient tolerated the procedure well without incident or complication and is in stable condition.

## 2023-12-01 NOTE — PROGRESS NOTES
Tanya Fan is a 78 y.o. female on day 0 of admission presenting with Sialoadenitis.      Subjective   Patient assessed at bedside. No acute events overnight or concerns at this time. Patient's clinical presentation and pain worsened from yesterday. Having some difficulty swallowing which is new today. Airway intact with no difficulties breathing. SpO2 > 92% on room air. No wheezing or acute respiratory distress.   Patient to have IR obtain US and percutaneous drainage today.       Objective     Last Recorded Vitals  BP 97/54 (BP Location: Left arm, Patient Position: Lying)   Pulse 67   Temp 36.8 °C (98.2 °F) (Temporal)   Resp 18   Wt 74.8 kg (165 lb)   SpO2 92%   Intake/Output last 3 Shifts:  No intake or output data in the 24 hours ending 12/01/23 1156    Admission Weight  Weight: 74.8 kg (165 lb) (11/29/23 1358)    Daily Weight  11/29/23 : 74.8 kg (165 lb)    Image Results  CT soft tissue neck w IV contrast  Narrative: Interpreted By:  Chi Sanchez,   STUDY:  CT SOFT TISSUE NECK W IV CONTRAST;  11/29/2023 4:14 pm      INDICATION:  Signs/Symptoms:swelling left side of neck, difficulty swallowing.      COMPARISON:  CT soft tissue neck 12/26/2019      ACCESSION NUMBER(S):  [id number]      ORDERING CLINICIAN:  LIGIA LONGORIA      TECHNIQUE:  Axial CT images of the neck were obtained.  The patient received 75  cc Omnipaque 350 intravenous contrast agent. The images were  reformatted in angled axial, coronal and sagittal planes.      FINDINGS:  Oral Cavity, Pharynx and Larynx:  Evaluation oral cavity is limited  by streak artifact from dental hardware.  The nasopharyngeal and  oropharyngeal structures are unremarkable. The hypopharyngeal and  laryngeal structures are unremarkable.      Retropharyngeal and Prevertebral Soft Tissues: Unremarkable.      Lymph nodes: There are few non specific bilateral neck nodes,  probably reactive in etiology.      Neck vessels: Bilateral neck vessels are normal in course  and caliber  and appear patent.      Thyroid gland: Several low-attenuation nodules scattered throughout  the thyroid gland with the largest measuring 8 mm.      Parotid and submandibular glands: Innumerable calcifications  scattered throughout bilateral parotid glands, more prominent on the  left. There is asymmetric enlargement of the left parotid gland with  diffuse soft tissue stranding and adjacent platysmal muscle  thickening. Findings are concerning for acute sialoadenitis secondary  to several sialoliths. No dilation of Stensen duct. Several  calcification within the right parotid gland without acute  inflammatory changes. Bilateral submandibular glands are unremarkable.      Paranasal Sinuses and Mastoids: Left maxillary sinus mucous retention  cyst. Mild paranasal sinus mucosal thickening otherwise.      Visualized orbital structures are unremarkable. Status post right  frontotemporal craniotomy.      Visualized upper lungs are clear.      Moderate to severe multilevel degenerative changes of the cervical  spine most prominent at C4-C5.      Impression: Several bilateral parotid gland sialoliths, with heterogeneous  enlargement, and surrounding soft tissue stranding involving the left  parotid gland, compatible with acute sialoadenitis due to  sialolithiasis. No Stensen duct dilation. No suspicious cervical  adenopathy.          MACRO:  None      Signed by: Chi Sanchez 11/29/2023 4:34 PM  Dictation workstation:   [id number]      Physical Exam  Vitals reviewed.   Constitutional:       General: She is awake. She is not in acute distress.     Appearance: She is not toxic-appearing.   HENT:      Head: Normocephalic and atraumatic.      Right Ear: External ear normal.      Left Ear: External ear normal.      Nose: Nose normal.      Mouth/Throat:      Mouth: Mucous membranes are moist.   Eyes:      Extraocular Movements: Extraocular movements intact.      Conjunctiva/sclera: Conjunctivae normal.       Pupils: Pupils are equal, round, and reactive to light.   Neck:      Comments: Prominent erythematous mass around left side jaw  Cardiovascular:      Rate and Rhythm: Normal rate and regular rhythm.      Pulses: Normal pulses.      Heart sounds: Normal heart sounds. No murmur heard.  Pulmonary:      Effort: Pulmonary effort is normal. No respiratory distress.      Breath sounds: Normal breath sounds. No wheezing, rhonchi or rales.   Abdominal:      General: Abdomen is flat. Bowel sounds are normal. There is no distension.      Palpations: Abdomen is soft. There is no hepatomegaly or pulsatile mass.      Tenderness: There is no abdominal tenderness. There is no guarding.   Musculoskeletal:         General: No signs of injury. Normal range of motion.      Cervical back: Normal range of motion.      Right lower leg: No edema.      Left lower leg: No edema.   Skin:     General: Skin is warm and dry.      Findings: No bruising, erythema, lesion or rash.   Neurological:      General: No focal deficit present.      Mental Status: She is alert and oriented to person, place, and time. Mental status is at baseline.      Cranial Nerves: Cranial nerves 2-12 are intact. No cranial nerve deficit.   Psychiatric:         Mood and Affect: Mood normal.         Behavior: Behavior normal.         Thought Content: Thought content normal.         Judgment: Judgment normal.         Relevant Results      Scheduled medications  amLODIPine, 2.5 mg, oral, Daily  aspirin, 81 mg, oral, Daily  bisoprolol, 10 mg, oral, Daily  ceFAZolin in dextrose (iso-os), 1 g, intravenous, q8h  cholecalciferol, 2,000 Units, oral, Daily  empagliflozin, 10 mg, oral, Daily  enoxaparin, 40 mg, subcutaneous, q24h  hydroCHLOROthiazide, 6.25 mg, oral, Daily  insulin lispro, 0-10 Units, subcutaneous, TID with meals  lisinopril, 40 mg, oral, Daily  [Held by provider] metFORMIN XR, 1,000 mg, oral, BID  polyethylene glycol, 17 g, oral, Daily  rosuvastatin, 20 mg, oral,  Daily  vancomycin (Vancocin) 1.25 g in dextrose 5 % in water (D5W) 250 mL IV, 1,250 mg, intravenous, q24h      Continuous medications     PRN medications  PRN medications: acetaminophen **OR** acetaminophen **OR** acetaminophen, dextrose 10 % in water (D10W), dextrose, glucagon, HYDROmorphone, ondansetron **OR** ondansetron, oxyCODONE    CT soft tissue neck w IV contrast    Result Date: 11/29/2023  Interpreted By:  Chi Sanchez, STUDY: CT SOFT TISSUE NECK W IV CONTRAST;  11/29/2023 4:14 pm   INDICATION: Signs/Symptoms:swelling left side of neck, difficulty swallowing.   COMPARISON: CT soft tissue neck 12/26/2019   ACCESSION NUMBER(S): [id number]   ORDERING CLINICIAN: LIGIA LONGORIA   TECHNIQUE: Axial CT images of the neck were obtained.  The patient received 75 cc Omnipaque 350 intravenous contrast agent. The images were reformatted in angled axial, coronal and sagittal planes.   FINDINGS: Oral Cavity, Pharynx and Larynx:  Evaluation oral cavity is limited by streak artifact from dental hardware.  The nasopharyngeal and oropharyngeal structures are unremarkable. The hypopharyngeal and laryngeal structures are unremarkable.   Retropharyngeal and Prevertebral Soft Tissues: Unremarkable.   Lymph nodes: There are few non specific bilateral neck nodes, probably reactive in etiology.   Neck vessels: Bilateral neck vessels are normal in course and caliber and appear patent.   Thyroid gland: Several low-attenuation nodules scattered throughout the thyroid gland with the largest measuring 8 mm.   Parotid and submandibular glands: Innumerable calcifications scattered throughout bilateral parotid glands, more prominent on the left. There is asymmetric enlargement of the left parotid gland with diffuse soft tissue stranding and adjacent platysmal muscle thickening. Findings are concerning for acute sialoadenitis secondary to several sialoliths. No dilation of Stensen duct. Several calcification within the right parotid  gland without acute inflammatory changes. Bilateral submandibular glands are unremarkable.   Paranasal Sinuses and Mastoids: Left maxillary sinus mucous retention cyst. Mild paranasal sinus mucosal thickening otherwise.   Visualized orbital structures are unremarkable. Status post right frontotemporal craniotomy.   Visualized upper lungs are clear.   Moderate to severe multilevel degenerative changes of the cervical spine most prominent at C4-C5.       Several bilateral parotid gland sialoliths, with heterogeneous enlargement, and surrounding soft tissue stranding involving the left parotid gland, compatible with acute sialoadenitis due to sialolithiasis. No Stensen duct dilation. No suspicious cervical adenopathy.     MACRO: None   Signed by: Chi Sanchez 11/29/2023 4:34 PM Dictation workstation:   [id number]     Results for orders placed or performed during the hospital encounter of 11/29/23 (from the past 24 hour(s))   POCT GLUCOSE   Result Value Ref Range    POCT Glucose 188 (H) 74 - 99 mg/dL   POCT GLUCOSE   Result Value Ref Range    POCT Glucose 265 (H) 74 - 99 mg/dL   POCT GLUCOSE   Result Value Ref Range    POCT Glucose 157 (H) 74 - 99 mg/dL   Vancomycin   Result Value Ref Range    Vancomycin 8.5 5.0 - 20.0 ug/mL   Renal function panel   Result Value Ref Range    Glucose 187 (H) 74 - 99 mg/dL    Sodium 133 (L) 136 - 145 mmol/L    Potassium 3.9 3.5 - 5.3 mmol/L    Chloride 99 98 - 107 mmol/L    Bicarbonate 23 21 - 32 mmol/L    Anion Gap 15 10 - 20 mmol/L    Urea Nitrogen 18 6 - 23 mg/dL    Creatinine 0.91 0.50 - 1.05 mg/dL    eGFR 66 >60 mL/min/1.73m*2    Calcium 9.2 8.6 - 10.3 mg/dL    Phosphorus 3.0 2.5 - 4.9 mg/dL    Albumin 3.6 3.4 - 5.0 g/dL   CBC and Auto Differential   Result Value Ref Range    WBC 14.4 (H) 4.4 - 11.3 x10*3/uL    nRBC 0.0 0.0 - 0.0 /100 WBCs    RBC 4.31 4.00 - 5.20 x10*6/uL    Hemoglobin 11.9 (L) 12.0 - 16.0 g/dL    Hematocrit 37.4 36.0 - 46.0 %    MCV 87 80 - 100 fL    MCH 27.6  26.0 - 34.0 pg    MCHC 31.8 (L) 32.0 - 36.0 g/dL    RDW 13.2 11.5 - 14.5 %    Platelets 297 150 - 450 x10*3/uL    Neutrophils % 73.4 40.0 - 80.0 %    Immature Granulocytes %, Automated 0.3 0.0 - 0.9 %    Lymphocytes % 17.4 13.0 - 44.0 %    Monocytes % 7.2 2.0 - 10.0 %    Eosinophils % 1.3 0.0 - 6.0 %    Basophils % 0.4 0.0 - 2.0 %    Neutrophils Absolute 10.54 (H) 1.60 - 5.50 x10*3/uL    Immature Granulocytes Absolute, Automated 0.05 0.00 - 0.50 x10*3/uL    Lymphocytes Absolute 2.49 0.80 - 3.00 x10*3/uL    Monocytes Absolute 1.03 (H) 0.05 - 0.80 x10*3/uL    Eosinophils Absolute 0.18 0.00 - 0.40 x10*3/uL    Basophils Absolute 0.06 0.00 - 0.10 x10*3/uL   POCT GLUCOSE   Result Value Ref Range    POCT Glucose 175 (H) 74 - 99 mg/dL                Assessment/Plan      1) Acute Sialoadenitis 2/2 Sialolithiasis  - CT neck: Several bilateral parotid gland sialoliths, with heterogeneous enlargement, and surrounding soft tissue stranding involving the left parotid gland, compatible with acute sialoadenitis due to sialolithiasis. No Stensen duct dilation. No suspicious cervical adenopathy   - ENT evaluated pt in ED upon admission, still in contact with Dr. Pabon via [employer]. Recommending IR to US with intention to percutaneous drain today since patient not improved.  - ID consulted, Vancomycin and Cefazolin IV  - IR consulted, will take this afternoon for US and perc drainage. No need to keep NPO.   - WBC 14.6>14.4  - Hgb stable 12.4>11.9  - Pain control, continue IV abx  - F/U with Dr. Mclain outpatient at discharge    2) DM2  3) HTN  - holding metformin d/t contrast, resume 12/2  - continue jardiance and other home meds  - SSI, hypoglycemic protocol, optimize glycemic control  - monitor BP and HR, currently stable    DVT ppx - lovenox    Dispo - pending IR results, possibly tomorrow    Labs/Testing reviewed  Interdisciplinary team rounding completed with hospitalist, nurse, TCC  PA discussed plan and lab/testing  results with hospital medicine attending Dr. Sher    * 45 minutes total spent on patient's care today; >50% time spent on counseling/coordination of care    Arelis Tristan PA-C

## 2023-12-01 NOTE — CARE PLAN
The patient's goals for the shift include  decreased pain throughout the shift    The clinical goals for the shift include pt will be free from injury throughout the shift    Over the shift, the patient made progress towards the above goals

## 2023-12-01 NOTE — CARE PLAN
The patient's goals for the shift include  pt will have decreased pain throughout the shift    The clinical goals for the shift include to clear infection and return home    Over the shift, the patient did make progress toward the following goals. Barriers to progression include . Recommendations to address these barriers include .

## 2023-12-01 NOTE — PROGRESS NOTES
"  INFECTIOUS DISEASE DAILY PROGRESS NOTE    SUBJECTIVE:    Still quite painful/swollen in the left neck. No fevers.    OBJECTIVE:  VITALS (Last 24 Hours)  BP 97/54 (BP Location: Left arm, Patient Position: Lying)   Pulse 67   Temp 36.8 °C (98.2 °F) (Temporal)   Resp 18   Ht 1.626 m (5' 4\")   Wt 74.8 kg (165 lb)   SpO2 92%   BMI 28.32 kg/m²     PHYSICAL EXAM:  Gen - NAD  Face - left face with swollen parotid gland and redness/pain/swelling/warmth, some of the spreading redness is better but overall looks the same  Abd - soft, no ttp, BS present  Skin - no rash    ABX: IV Vanc/Cefazolin    LABS:  Lab Results   Component Value Date    WBC 14.4 (H) 12/01/2023    HGB 11.9 (L) 12/01/2023    HCT 37.4 12/01/2023    MCV 87 12/01/2023     12/01/2023     Lab Results   Component Value Date    GLUCOSE 187 (H) 12/01/2023    CALCIUM 9.2 12/01/2023     (L) 12/01/2023    K 3.9 12/01/2023    CO2 23 12/01/2023    CL 99 12/01/2023    BUN 18 12/01/2023    CREATININE 0.91 12/01/2023     Results from last 72 hours   Lab Units 12/01/23  0344 11/29/23  1443   ALK PHOS U/L  --  67   BILIRUBIN TOTAL mg/dL  --  0.4   PROTEIN TOTAL g/dL  --  6.9   ALT U/L  --  15   AST U/L  --  14   ALBUMIN g/dL 3.6 4.2     Estimated Creatinine Clearance: 52.1 mL/min (by C-G formula based on SCr of 0.91 mg/dL).    ASSESSMENT/PLAN:     Left Parotitis with Stones  Abx Allergies - limits abx choices     IV Vanc and IV Cefazolin.    US with IR and possible aspiration - please send for culture if able.     Monitoring for adverse effects of abx such as rash/itching/diarrhea.    Will follow peripherally over the weekend. Please call Dr. Wise with questions. Thanks! D/w TR Infante and Dr. Cm Geller MD  [facility]  Office #[phone]      "

## 2023-12-01 NOTE — CONSULTS
Consults    Reason For Consult  Parotid gland infection    History Of Present Illness  Tanya Fan is a 78 y.o. female presenting with pain, swelling, and erythema of the parotid gland. She states she has a history of a right sided parotid gland infection several years ago which was treated with PO abx and aspiration. She noted increasing left sided pain over the weekend and went to the urgent care. She was treated with PO steroids. Her symptoms continued to worsen. She was started on clindamycin by her PCP and was advised to go to the ED if no improvement. Patient in observation from ED with pain and edema of the left parotid gland. ENT following, recommended abx overnight. No clinical improvement today. IR consulted for US and aspiration of parotid gland/abscess.      Past Medical History  She has a past medical history of Acute pharyngitis, unspecified (05/07/2016), Acute pharyngitis, unspecified (05/07/2016), Acute upper respiratory infection, unspecified (06/02/2016), Contusion of unspecified knee, initial encounter (07/30/2015), Dysuria (04/09/2015), Encounter for full-term uncomplicated delivery, Other disorders of nervous system (04/04/2014), Personal history of other diseases of the circulatory system, Personal history of other diseases of the respiratory system (05/12/2016), Personal history of pneumonia (recurrent) (02/14/2020), Personal history of urinary (tract) infections (04/13/2015), Recurrent UTI (04/04/2023), Unspecified conjunctivitis (05/27/2016), and Varicella without complication.    Surgical History  She has a past surgical history that includes Other surgical history (11/14/2013); Bladder surgery (11/14/2013); Hysterectomy (11/14/2013); Foot surgery (11/14/2013); Other surgical history (07/25/2019); Other surgical history (07/25/2019); Other surgical history (07/25/2019); Hand surgery (12/13/2013); MR angio head wo IV contrast (6/27/2019); and MR angio neck wo IV contrast (6/27/2019).      Social History  She reports that she has never smoked. She has never used smokeless tobacco. She reports that she does not drink alcohol and does not use drugs.    Family History  Family History   Problem Relation Name Age of Onset    Stroke Father      Diabetes Other Family     Hypertension Other Family         Allergies  Amoxicillin-pot clavulanate, Doxycycline, and Aspartame    MEDS:    Current Facility-Administered Medications:     acetaminophen (Tylenol) tablet 650 mg, 650 mg, oral, q4h PRN, 650 mg at 11/30/23 0932 **OR** acetaminophen (Tylenol) oral liquid 650 mg, 650 mg, oral, q4h PRN **OR** acetaminophen (Tylenol) suppository 650 mg, 650 mg, rectal, q4h PRN, TODD Pino    amLODIPine (Norvasc) tablet 2.5 mg, 2.5 mg, oral, Daily, TODD Pino, 2.5 mg at 11/30/23 0935    aspirin EC tablet 81 mg, 81 mg, oral, Daily, TODD Pino, 81 mg at 12/01/23 0841    bisoprolol (Zebeta) tablet 10 mg, 10 mg, oral, Daily, Frankie RojoD, 10 mg at 11/30/23 1003    ceFAZolin in dextrose (iso-os) (Ancef) IVPB 1 g, 1 g, intravenous, q8h, TODD Pino, Stopped at 12/01/23 0417    cholecalciferol (Vitamin D-3) tablet 2,000 Units, 2,000 Units, oral, Daily, TODD Pino, 2,000 Units at 12/01/23 0545    dextrose 10 % in water (D10W) infusion, 0.3 g/kg/hr, intravenous, Once PRN, TODD Pino    dextrose 50 % injection 25 g, 25 g, intravenous, q15 min PRN, TODD Pino    empagliflozin (Jardiance) tablet 10 mg, 10 mg, oral, Daily, TODD Pino, 10 mg at 12/01/23 0841    enoxaparin (Lovenox) syringe 40 mg, 40 mg, subcutaneous, q24h, Jojo Wolinski, APRN-CNP    glucagon (Glucagen) injection 1 mg, 1 mg, intramuscular, q15 min PRN, Mirella Ni, APRN-LAURA    hydroCHLOROthiazide (HYDRODiuril) tablet 6.25 mg, 6.25 mg, oral, Daily, Mary Jane Sultana, PharmD, 6.25 mg at 11/30/23 8829    HYDROmorphone (Dilaudid)  injection 0.4 mg, 0.4 mg, intravenous, q3h PRN, Macy Brady MD, 0.4 mg at 12/01/23 1036    insulin lispro (HumaLOG) injection 0-10 Units, 0-10 Units, subcutaneous, TID with meals, TODD Pino, 6 Units at 11/30/23 1651    lisinopril tablet 40 mg, 40 mg, oral, Daily, TODD Pino, 40 mg at 11/30/23 0934    [Held by provider] metFORMIN XR (Glucophage-XR) 24 hr tablet 1,000 mg, 1,000 mg, oral, BID, TODD Pino    ondansetron (Zofran) tablet 4 mg, 4 mg, oral, q8h PRN **OR** ondansetron (Zofran) injection 4 mg, 4 mg, intravenous, q8h PRN, TODD Pino    oxyCODONE (Roxicodone) immediate release tablet 5 mg, 5 mg, oral, q6h PRN, TODD Pino    polyethylene glycol (Glycolax, Miralax) packet 17 g, 17 g, oral, Daily, TODD Pino, 17 g at 12/01/23 0841    rosuvastatin (Crestor) tablet 20 mg, 20 mg, oral, Daily, TODD Pino, 20 mg at 12/01/23 0841    vancomycin (Vancocin) 1.25 g in dextrose 5 % in water (D5W) 250 mL IV, 1,250 mg, intravenous, q24h, Niyah Goff, PharmD, Stopped at 11/30/23 1551    Review of Systems  History obtained from the patient  General ROS: positive for  - malaise  ENT ROS: positive for - Pain and edema of left neck  Respiratory ROS: no cough, shortness of breath, or wheezing  Cardiovascular ROS: no chest pain or dyspnea on exertion  Gastrointestinal ROS: no abdominal pain, change in bowel habits, or black or bloody stools  Genitourinary ROS: no dysuria, trouble voiding, or hematuria     Last Recorded Vitals  BP 97/54 (BP Location: Left arm, Patient Position: Lying)   Pulse 67   Temp 36.8 °C (98.2 °F) (Temporal)   Resp 18   Wt 74.8 kg (165 lb)   SpO2 92%      Physical Exam  Orientation: oriented to person, place, time, and general circumstances  HEENT: Significant edema and erythema to the left lateral neck and submandibular region. Area is warm and tender to touch.   Pulm:   "unlabored  GI:  soft      Relevant Results    LABS:  Lab Results   Component Value Date    WBC 14.4 (H) 12/01/2023    HGB 11.9 (L) 12/01/2023    HCT 37.4 12/01/2023    MCV 87 12/01/2023     12/01/2023      Results from last 72 hours   Lab Units 12/01/23  0344   SODIUM mmol/L 133*   POTASSIUM mmol/L 3.9   CHLORIDE mmol/L 99   CO2 mmol/L 23   BUN mg/dL 18   CREATININE mg/dL 0.91   GLUCOSE mg/dL 187*   CALCIUM mg/dL 9.2   ANION GAP mmol/L 15   EGFR mL/min/1.73m*2 66     Results from last 72 hours   Lab Units 12/01/23  0344 11/29/23  1443   ALK PHOS U/L  --  67   BILIRUBIN TOTAL mg/dL  --  0.4   PROTEIN TOTAL g/dL  --  6.9   ALT U/L  --  15   AST U/L  --  14   ALBUMIN g/dL 3.6 4.2           No lab exists for component: \"PT\"    MICRO:  No results found for the last 14 days.      IMAGING:   CT soft tissue neck w IV contrast   Final Result   Several bilateral parotid gland sialoliths, with heterogeneous   enlargement, and surrounding soft tissue stranding involving the left   parotid gland, compatible with acute sialoadenitis due to   sialolithiasis. No Stensen duct dilation. No suspicious cervical   adenopathy.             MACRO:   None        Signed by: Chi Sanchez 11/29/2023 4:34 PM   Dictation workstation:   [id number]      US guided fine percutaneous aspiration    (Results Pending)          Assessment/Plan     78 year old female with history of parotid stones, admitted with pain and swelling of the left parotid gland. Symptoms not improved with IV abx, WBC stable at 14.   Discussed case with ENT.     We will plan for ultrasound evaluation of left parotid gland with possible aspiration today. Symptoms today more concerning for abscess.     Risks of percutaneous aspiration were reviewed including but not limited to pain, bleeding, infection, damage to underlying structures including facial nerve. Benefits of treatment were also reviewed. Patient would like to proceed with intervention, and will discuss " futher with IR attending prior to procedure.     Plan for local only. Does not need to be NPO.       Dorie Garcia, APRN-CNP          Time : Billing Time  Prep time on date of the patient encounter: 10 minutes.   Time spent directly with patient/family/caregiver: 20 minutes.   Documentation time: 10 minutes.   Total time (minutes):  40 minutes  Time Spent with this Patient (minutes).  More than 50% of This Time was Spent in Counseling and/or Coordination of Care

## 2023-12-02 PROBLEM — K11.21 PAROTITIS, ACUTE: Status: ACTIVE | Noted: 2023-12-02

## 2023-12-02 LAB
ALBUMIN SERPL BCP-MCNC: 3.9 G/DL (ref 3.4–5)
ANION GAP SERPL CALC-SCNC: 15 MMOL/L (ref 10–20)
BUN SERPL-MCNC: 19 MG/DL (ref 6–23)
CALCIUM SERPL-MCNC: 9.7 MG/DL (ref 8.6–10.3)
CHLORIDE SERPL-SCNC: 103 MMOL/L (ref 98–107)
CO2 SERPL-SCNC: 25 MMOL/L (ref 21–32)
CREAT SERPL-MCNC: 0.85 MG/DL (ref 0.5–1.05)
ERYTHROCYTE [DISTWIDTH] IN BLOOD BY AUTOMATED COUNT: 13.2 % (ref 11.5–14.5)
GFR SERPL CREATININE-BSD FRML MDRD: 71 ML/MIN/1.73M*2
GLUCOSE BLD MANUAL STRIP-MCNC: 256 MG/DL (ref 74–99)
GLUCOSE BLD MANUAL STRIP-MCNC: 320 MG/DL (ref 74–99)
GLUCOSE BLD MANUAL STRIP-MCNC: 321 MG/DL (ref 74–99)
GLUCOSE BLD MANUAL STRIP-MCNC: 440 MG/DL (ref 74–99)
GLUCOSE SERPL-MCNC: 212 MG/DL (ref 74–99)
HCT VFR BLD AUTO: 39.9 % (ref 36–46)
HGB BLD-MCNC: 12.7 G/DL (ref 12–16)
MCH RBC QN AUTO: 28 PG (ref 26–34)
MCHC RBC AUTO-ENTMCNC: 31.8 G/DL (ref 32–36)
MCV RBC AUTO: 88 FL (ref 80–100)
NRBC BLD-RTO: 0 /100 WBCS (ref 0–0)
PHOSPHATE SERPL-MCNC: 3.9 MG/DL (ref 2.5–4.9)
PLATELET # BLD AUTO: 320 X10*3/UL (ref 150–450)
POTASSIUM SERPL-SCNC: 4.5 MMOL/L (ref 3.5–5.3)
RBC # BLD AUTO: 4.53 X10*6/UL (ref 4–5.2)
SODIUM SERPL-SCNC: 138 MMOL/L (ref 136–145)
WBC # BLD AUTO: 10.4 X10*3/UL (ref 4.4–11.3)

## 2023-12-02 PROCEDURE — 1100000001 HC PRIVATE ROOM DAILY

## 2023-12-02 PROCEDURE — 2500000004 HC RX 250 GENERAL PHARMACY W/ HCPCS (ALT 636 FOR OP/ED): Performed by: STUDENT IN AN ORGANIZED HEALTH CARE EDUCATION/TRAINING PROGRAM

## 2023-12-02 PROCEDURE — 99233 SBSQ HOSP IP/OBS HIGH 50: CPT | Performed by: NURSE PRACTITIONER

## 2023-12-02 PROCEDURE — 2500000004 HC RX 250 GENERAL PHARMACY W/ HCPCS (ALT 636 FOR OP/ED): Performed by: NURSE PRACTITIONER

## 2023-12-02 PROCEDURE — 36415 COLL VENOUS BLD VENIPUNCTURE: CPT | Performed by: PHARMACIST

## 2023-12-02 PROCEDURE — 85027 COMPLETE CBC AUTOMATED: CPT | Performed by: NURSE PRACTITIONER

## 2023-12-02 PROCEDURE — 2500000004 HC RX 250 GENERAL PHARMACY W/ HCPCS (ALT 636 FOR OP/ED): Performed by: PHARMACIST

## 2023-12-02 PROCEDURE — 96372 THER/PROPH/DIAG INJ SC/IM: CPT | Performed by: NURSE PRACTITIONER

## 2023-12-02 PROCEDURE — 2500000001 HC RX 250 WO HCPCS SELF ADMINISTERED DRUGS (ALT 637 FOR MEDICARE OP): Performed by: NURSE PRACTITIONER

## 2023-12-02 PROCEDURE — 2500000002 HC RX 250 W HCPCS SELF ADMINISTERED DRUGS (ALT 637 FOR MEDICARE OP, ALT 636 FOR OP/ED): Performed by: NURSE PRACTITIONER

## 2023-12-02 PROCEDURE — 82947 ASSAY GLUCOSE BLOOD QUANT: CPT

## 2023-12-02 PROCEDURE — 2500000001 HC RX 250 WO HCPCS SELF ADMINISTERED DRUGS (ALT 637 FOR MEDICARE OP): Performed by: PHARMACIST

## 2023-12-02 PROCEDURE — 80069 RENAL FUNCTION PANEL: CPT | Performed by: PHARMACIST

## 2023-12-02 PROCEDURE — 36415 COLL VENOUS BLD VENIPUNCTURE: CPT | Performed by: NURSE PRACTITIONER

## 2023-12-02 RX ADMIN — DEXAMETHASONE 10 MG: 6 TABLET ORAL at 06:11

## 2023-12-02 RX ADMIN — AMLODIPINE BESYLATE 2.5 MG: 5 TABLET ORAL at 08:36

## 2023-12-02 RX ADMIN — VANCOMYCIN HYDROCHLORIDE 1.25 G: 10 INJECTION, POWDER, LYOPHILIZED, FOR SOLUTION INTRAVENOUS at 14:04

## 2023-12-02 RX ADMIN — BISOPROLOL FUMARATE 10 MG: 5 TABLET, FILM COATED ORAL at 08:36

## 2023-12-02 RX ADMIN — ENOXAPARIN SODIUM 40 MG: 40 INJECTION SUBCUTANEOUS at 14:03

## 2023-12-02 RX ADMIN — DEXAMETHASONE 10 MG: 6 TABLET ORAL at 14:03

## 2023-12-02 RX ADMIN — INSULIN LISPRO 8 UNITS: 100 INJECTION, SOLUTION INTRAVENOUS; SUBCUTANEOUS at 12:45

## 2023-12-02 RX ADMIN — INSULIN LISPRO 6 UNITS: 100 INJECTION, SOLUTION INTRAVENOUS; SUBCUTANEOUS at 08:37

## 2023-12-02 RX ADMIN — CHOLECALCIFEROL TAB 25 MCG (1000 UNIT) 2000 UNITS: 25 TAB at 06:10

## 2023-12-02 RX ADMIN — HYDROCHLOROTHIAZIDE 6.25 MG: 25 TABLET ORAL at 08:34

## 2023-12-02 RX ADMIN — CEFAZOLIN SODIUM 1 G: 1 INJECTION, SOLUTION INTRAVENOUS at 04:10

## 2023-12-02 RX ADMIN — ASPIRIN 81 MG: 81 TABLET, COATED ORAL at 08:36

## 2023-12-02 RX ADMIN — CEFAZOLIN SODIUM 1 G: 1 INJECTION, SOLUTION INTRAVENOUS at 12:04

## 2023-12-02 RX ADMIN — LISINOPRIL 40 MG: 20 TABLET ORAL at 08:36

## 2023-12-02 RX ADMIN — EMPAGLIFLOZIN 10 MG: 10 TABLET, FILM COATED ORAL at 08:36

## 2023-12-02 RX ADMIN — ROSUVASTATIN CALCIUM 20 MG: 20 TABLET, FILM COATED ORAL at 08:36

## 2023-12-02 RX ADMIN — INSULIN LISPRO 10 UNITS: 100 INJECTION, SOLUTION INTRAVENOUS; SUBCUTANEOUS at 16:24

## 2023-12-02 RX ADMIN — CEFAZOLIN SODIUM 1 G: 1 INJECTION, SOLUTION INTRAVENOUS at 20:00

## 2023-12-02 ASSESSMENT — PAIN SCALES - GENERAL: PAINLEVEL_OUTOF10: 0 - NO PAIN

## 2023-12-02 NOTE — NURSING NOTE
Discussed with Antonio BOX that IR nurse said no fluid was able to be aspirated from py. Neck. She states she will await note from IR. Pt. Denies need for pain meds presently. Left neck remains red, swollen, and red to the tough. Swallowing without complications. Cont. To monitor.

## 2023-12-02 NOTE — SIGNIFICANT EVENT
Spoke with Dr. Pabon (ENT) via [employer] Secure Chat regarding results from IR (no collection, no abscess found, no aspiration performed). Recommended the following:  - Continue current antibiotics ensuring that current regimen covers gram negative as well. Dr. Geller is on the case and giving IV abx recs.  - Add decadron 10mg q8H x 24h > can taper with medrol dose pack on discharge based on her response over the weekend; will add coverage for gastritis like PPI    Arelis Tristan PA-C

## 2023-12-02 NOTE — CARE PLAN
The patient's goals for the shift include      The clinical goals for the shift include pt will be free of pain throughout the shift    Over the shift, the patient did make progress toward the following goals. Barriers to progression include. Recommendations to address these barriers include frequently monitor for pain. Denies need for pain meds.  thisshift.

## 2023-12-02 NOTE — PROGRESS NOTES
Tanya Fan is a 78 y.o. female on day 0 of admission presenting with Sialoadenitis.      Subjective   Patient assessed at bedside. No acute events overnight or concerns at this time.  Patient reports pain is slightly less and she feels swelling is slightly less today as well.  No problems swallowing at this time.     Objective     Last Recorded Vitals  /73 (BP Location: Left arm, Patient Position: Sitting)   Pulse 73   Temp 36.6 °C (97.9 °F) (Temporal)   Resp 18   Wt 74.8 kg (165 lb)   SpO2 94%   Intake/Output last 3 Shifts:    Intake/Output Summary (Last 24 hours) at 12/2/2023 1307  Last data filed at 12/1/2023 2030  Gross per 24 hour   Intake 325 ml   Output --   Net 325 ml       Admission Weight  Weight: 74.8 kg (165 lb) (11/29/23 1358)    Daily Weight  11/29/23 : 74.8 kg (165 lb)    Image Results  CT soft tissue neck w IV contrast  Narrative: Interpreted By:  Chi Sanchez,   STUDY:  CT SOFT TISSUE NECK W IV CONTRAST;  11/29/2023 4:14 pm      INDICATION:  Signs/Symptoms:swelling left side of neck, difficulty swallowing.      COMPARISON:  CT soft tissue neck 12/26/2019      ACCESSION NUMBER(S):  [id number]      ORDERING CLINICIAN:  LIGIA LONGORIA      TECHNIQUE:  Axial CT images of the neck were obtained.  The patient received 75  cc Omnipaque 350 intravenous contrast agent. The images were  reformatted in angled axial, coronal and sagittal planes.      FINDINGS:  Oral Cavity, Pharynx and Larynx:  Evaluation oral cavity is limited  by streak artifact from dental hardware.  The nasopharyngeal and  oropharyngeal structures are unremarkable. The hypopharyngeal and  laryngeal structures are unremarkable.      Retropharyngeal and Prevertebral Soft Tissues: Unremarkable.      Lymph nodes: There are few non specific bilateral neck nodes,  probably reactive in etiology.      Neck vessels: Bilateral neck vessels are normal in course and caliber  and appear patent.      Thyroid gland: Several  low-attenuation nodules scattered throughout  the thyroid gland with the largest measuring 8 mm.      Parotid and submandibular glands: Innumerable calcifications  scattered throughout bilateral parotid glands, more prominent on the  left. There is asymmetric enlargement of the left parotid gland with  diffuse soft tissue stranding and adjacent platysmal muscle  thickening. Findings are concerning for acute sialoadenitis secondary  to several sialoliths. No dilation of Stensen duct. Several  calcification within the right parotid gland without acute  inflammatory changes. Bilateral submandibular glands are unremarkable.      Paranasal Sinuses and Mastoids: Left maxillary sinus mucous retention  cyst. Mild paranasal sinus mucosal thickening otherwise.      Visualized orbital structures are unremarkable. Status post right  frontotemporal craniotomy.      Visualized upper lungs are clear.      Moderate to severe multilevel degenerative changes of the cervical  spine most prominent at C4-C5.      Impression: Several bilateral parotid gland sialoliths, with heterogeneous  enlargement, and surrounding soft tissue stranding involving the left  parotid gland, compatible with acute sialoadenitis due to  sialolithiasis. No Stensen duct dilation. No suspicious cervical  adenopathy.          MACRO:  None      Signed by: Chi Sanchez 11/29/2023 4:34 PM  Dictation workstation:   [id number]      Physical Exam  Vitals reviewed.   Constitutional:       General: She is awake. She is not in acute distress.     Appearance: She is not toxic-appearing.   HENT:      Head: Normocephalic and atraumatic.      Right Ear: External ear normal.      Left Ear: External ear normal.      Nose: Nose normal.      Mouth/Throat:      Mouth: Mucous membranes are moist.   Eyes:      Extraocular Movements: Extraocular movements intact.      Conjunctiva/sclera: Conjunctivae normal.      Pupils: Pupils are equal, round, and reactive to light.   Neck:       Comments: Prominent erythematous mass around left side jaw  Cardiovascular:      Rate and Rhythm: Normal rate and regular rhythm.      Pulses: Normal pulses.      Heart sounds: Normal heart sounds. No murmur heard.  Pulmonary:      Effort: Pulmonary effort is normal. No respiratory distress.      Breath sounds: Normal breath sounds. No wheezing, rhonchi or rales.   Abdominal:      General: Abdomen is flat. Bowel sounds are normal. There is no distension.      Palpations: Abdomen is soft. There is no hepatomegaly or pulsatile mass.      Tenderness: There is no abdominal tenderness. There is no guarding.   Musculoskeletal:         General: No signs of injury. Normal range of motion.      Cervical back: Normal range of motion.      Right lower leg: No edema.      Left lower leg: No edema.   Skin:     General: Skin is warm and dry.      Findings: No bruising, erythema, lesion or rash.   Neurological:      General: No focal deficit present.      Mental Status: She is alert and oriented to person, place, and time. Mental status is at baseline.      Cranial Nerves: Cranial nerves 2-12 are intact. No cranial nerve deficit.   Psychiatric:         Mood and Affect: Mood normal.         Behavior: Behavior normal.         Thought Content: Thought content normal.         Judgment: Judgment normal.       Scheduled medications  amLODIPine, 2.5 mg, oral, Daily  aspirin, 81 mg, oral, Daily  bisoprolol, 10 mg, oral, Daily  ceFAZolin in dextrose (iso-os), 1 g, intravenous, q8h  cholecalciferol, 2,000 Units, oral, Daily  dexAMETHasone, 10 mg, oral, q8h ARCHIE  empagliflozin, 10 mg, oral, Daily  enoxaparin, 40 mg, subcutaneous, q24h  hydroCHLOROthiazide, 6.25 mg, oral, Daily  insulin lispro, 0-10 Units, subcutaneous, TID with meals  lisinopril, 40 mg, oral, Daily  [Held by provider] metFORMIN XR, 1,000 mg, oral, BID  pantoprazole, 40 mg, oral, Daily before breakfast  polyethylene glycol, 17 g, oral, Daily  rosuvastatin, 20 mg, oral,  Daily  vancomycin (Vancocin) 1.25 g in dextrose 5 % in water (D5W) 250 mL IV, 1,250 mg, intravenous, q24h      Continuous medications     PRN medications  PRN medications: acetaminophen **OR** acetaminophen **OR** acetaminophen, dextrose 10 % in water (D10W), dextrose, glucagon, HYDROmorphone, ondansetron **OR** ondansetron, oxyCODONE    CT soft tissue neck w IV contrast    Result Date: 11/29/2023  Interpreted By:  Chi Sanchez, STUDY: CT SOFT TISSUE NECK W IV CONTRAST;  11/29/2023 4:14 pm   INDICATION: Signs/Symptoms:swelling left side of neck, difficulty swallowing.   COMPARISON: CT soft tissue neck 12/26/2019   ACCESSION NUMBER(S): [id number]   ORDERING CLINICIAN: LIGIA LONGORIA   TECHNIQUE: Axial CT images of the neck were obtained.  The patient received 75 cc Omnipaque 350 intravenous contrast agent. The images were reformatted in angled axial, coronal and sagittal planes.   FINDINGS: Oral Cavity, Pharynx and Larynx:  Evaluation oral cavity is limited by streak artifact from dental hardware.  The nasopharyngeal and oropharyngeal structures are unremarkable. The hypopharyngeal and laryngeal structures are unremarkable.   Retropharyngeal and Prevertebral Soft Tissues: Unremarkable.   Lymph nodes: There are few non specific bilateral neck nodes, probably reactive in etiology.   Neck vessels: Bilateral neck vessels are normal in course and caliber and appear patent.   Thyroid gland: Several low-attenuation nodules scattered throughout the thyroid gland with the largest measuring 8 mm.   Parotid and submandibular glands: Innumerable calcifications scattered throughout bilateral parotid glands, more prominent on the left. There is asymmetric enlargement of the left parotid gland with diffuse soft tissue stranding and adjacent platysmal muscle thickening. Findings are concerning for acute sialoadenitis secondary to several sialoliths. No dilation of Stensen duct. Several calcification within the right parotid  gland without acute inflammatory changes. Bilateral submandibular glands are unremarkable.   Paranasal Sinuses and Mastoids: Left maxillary sinus mucous retention cyst. Mild paranasal sinus mucosal thickening otherwise.   Visualized orbital structures are unremarkable. Status post right frontotemporal craniotomy.   Visualized upper lungs are clear.   Moderate to severe multilevel degenerative changes of the cervical spine most prominent at C4-C5.       Several bilateral parotid gland sialoliths, with heterogeneous enlargement, and surrounding soft tissue stranding involving the left parotid gland, compatible with acute sialoadenitis due to sialolithiasis. No Stensen duct dilation. No suspicious cervical adenopathy.     MACRO: None   Signed by: Chi Sanchez 11/29/2023 4:34 PM Dictation workstation:   [id number]     Results for orders placed or performed during the hospital encounter of 11/29/23 (from the past 24 hour(s))   POCT GLUCOSE   Result Value Ref Range    POCT Glucose 232 (H) 74 - 99 mg/dL   Renal function panel   Result Value Ref Range    Glucose 212 (H) 74 - 99 mg/dL    Sodium 138 136 - 145 mmol/L    Potassium 4.5 3.5 - 5.3 mmol/L    Chloride 103 98 - 107 mmol/L    Bicarbonate 25 21 - 32 mmol/L    Anion Gap 15 10 - 20 mmol/L    Urea Nitrogen 19 6 - 23 mg/dL    Creatinine 0.85 0.50 - 1.05 mg/dL    eGFR 71 >60 mL/min/1.73m*2    Calcium 9.7 8.6 - 10.3 mg/dL    Phosphorus 3.9 2.5 - 4.9 mg/dL    Albumin 3.9 3.4 - 5.0 g/dL   CBC   Result Value Ref Range    WBC 10.4 4.4 - 11.3 x10*3/uL    nRBC 0.0 0.0 - 0.0 /100 WBCs    RBC 4.53 4.00 - 5.20 x10*6/uL    Hemoglobin 12.7 12.0 - 16.0 g/dL    Hematocrit 39.9 36.0 - 46.0 %    MCV 88 80 - 100 fL    MCH 28.0 26.0 - 34.0 pg    MCHC 31.8 (L) 32.0 - 36.0 g/dL    RDW 13.2 11.5 - 14.5 %    Platelets 320 150 - 450 x10*3/uL   POCT GLUCOSE   Result Value Ref Range    POCT Glucose 256 (H) 74 - 99 mg/dL   POCT GLUCOSE   Result Value Ref Range    POCT Glucose 320 (H) 74 - 99  mg/dL         Assessment/Plan      1) Acute Sialoadenitis 2/2 Sialolithiasis  - CT neck: Several bilateral parotid gland sialoliths, with heterogeneous enlargement, and surrounding soft tissue stranding involving the left parotid gland, compatible with acute sialoadenitis due to sialolithiasis. No Stensen duct dilation. No suspicious cervical adenopathy   - ENT evaluated pt in ED upon admission, still in contact with Dr. Pabon via [employer] Secure Chat. Recommending IR to US with intention to percutaneous drain today since patient not improved.  - ID consulted, Vancomycin and Cefazolin IV  - IR consulted - > not enough to drain.  - WBC 14.6>14.4  - Hgb stable 12.4>11.9  - Pain control, continue IV abx  - F/U with Dr. Mclain outpatient at discharge    2) DM2  3) HTN  - holding metformin d/t contrast, resume 12/2  - continue jardiance and other home meds  - SSI, hypoglycemic protocol, optimize glycemic control  - monitor BP and HR, currently stable    DVT ppx - lovenox    Dispo - pending id recs for iv abx. Reached out via secure chat. May be candidate for hosp at home if need cont iv abx.     Labs/Testing reviewed  Interdisciplinary team rounding completed with hospitalist, nurse, TCC  NP discussed plan and lab/testing results with hospital medicine attending Dr. Casey   - upgrade to hosp team today, moved to inpt status  45 minutes total spent on patient's care today; >50% time spent on counseling/coordination of care  Julee Holden, APRN-CNP

## 2023-12-03 VITALS
HEART RATE: 62 BPM | WEIGHT: 165 LBS | TEMPERATURE: 97.3 F | HEIGHT: 64 IN | OXYGEN SATURATION: 95 % | RESPIRATION RATE: 17 BRPM | BODY MASS INDEX: 28.17 KG/M2 | SYSTOLIC BLOOD PRESSURE: 113 MMHG | DIASTOLIC BLOOD PRESSURE: 63 MMHG

## 2023-12-03 LAB
ANION GAP SERPL CALC-SCNC: 17 MMOL/L (ref 10–20)
BUN SERPL-MCNC: 31 MG/DL (ref 6–23)
CALCIUM SERPL-MCNC: 9.1 MG/DL (ref 8.6–10.3)
CHLORIDE SERPL-SCNC: 105 MMOL/L (ref 98–107)
CO2 SERPL-SCNC: 21 MMOL/L (ref 21–32)
CREAT SERPL-MCNC: 0.89 MG/DL (ref 0.5–1.05)
ERYTHROCYTE [DISTWIDTH] IN BLOOD BY AUTOMATED COUNT: 13.1 % (ref 11.5–14.5)
GFR SERPL CREATININE-BSD FRML MDRD: 67 ML/MIN/1.73M*2
GLUCOSE BLD MANUAL STRIP-MCNC: 304 MG/DL (ref 74–99)
GLUCOSE SERPL-MCNC: 246 MG/DL (ref 74–99)
HCT VFR BLD AUTO: 36.7 % (ref 36–46)
HGB BLD-MCNC: 11.9 G/DL (ref 12–16)
MCH RBC QN AUTO: 28.2 PG (ref 26–34)
MCHC RBC AUTO-ENTMCNC: 32.4 G/DL (ref 32–36)
MCV RBC AUTO: 87 FL (ref 80–100)
NRBC BLD-RTO: 0 /100 WBCS (ref 0–0)
PLATELET # BLD AUTO: 345 X10*3/UL (ref 150–450)
POTASSIUM SERPL-SCNC: 3.9 MMOL/L (ref 3.5–5.3)
RBC # BLD AUTO: 4.22 X10*6/UL (ref 4–5.2)
SODIUM SERPL-SCNC: 139 MMOL/L (ref 136–145)
WBC # BLD AUTO: 13.2 X10*3/UL (ref 4.4–11.3)

## 2023-12-03 PROCEDURE — 99231 SBSQ HOSP IP/OBS SF/LOW 25: CPT | Performed by: NURSE PRACTITIONER

## 2023-12-03 PROCEDURE — 80048 BASIC METABOLIC PNL TOTAL CA: CPT | Performed by: NURSE PRACTITIONER

## 2023-12-03 PROCEDURE — 2500000001 HC RX 250 WO HCPCS SELF ADMINISTERED DRUGS (ALT 637 FOR MEDICARE OP): Performed by: NURSE PRACTITIONER

## 2023-12-03 PROCEDURE — 2500000004 HC RX 250 GENERAL PHARMACY W/ HCPCS (ALT 636 FOR OP/ED): Performed by: NURSE PRACTITIONER

## 2023-12-03 PROCEDURE — 36415 COLL VENOUS BLD VENIPUNCTURE: CPT | Performed by: NURSE PRACTITIONER

## 2023-12-03 PROCEDURE — 82947 ASSAY GLUCOSE BLOOD QUANT: CPT

## 2023-12-03 PROCEDURE — 2500000002 HC RX 250 W HCPCS SELF ADMINISTERED DRUGS (ALT 637 FOR MEDICARE OP, ALT 636 FOR OP/ED): Performed by: NURSE PRACTITIONER

## 2023-12-03 PROCEDURE — 2500000001 HC RX 250 WO HCPCS SELF ADMINISTERED DRUGS (ALT 637 FOR MEDICARE OP): Performed by: PHARMACIST

## 2023-12-03 PROCEDURE — 85027 COMPLETE CBC AUTOMATED: CPT | Performed by: NURSE PRACTITIONER

## 2023-12-03 PROCEDURE — 2500000004 HC RX 250 GENERAL PHARMACY W/ HCPCS (ALT 636 FOR OP/ED): Performed by: STUDENT IN AN ORGANIZED HEALTH CARE EDUCATION/TRAINING PROGRAM

## 2023-12-03 RX ORDER — OXYCODONE HYDROCHLORIDE 5 MG/1
5 TABLET ORAL EVERY 6 HOURS PRN
Qty: 15 TABLET | Refills: 0 | Status: SHIPPED | OUTPATIENT
Start: 2023-12-03 | End: 2024-01-04 | Stop reason: WASHOUT

## 2023-12-03 RX ORDER — LINEZOLID 600 MG/1
600 TABLET, FILM COATED ORAL 2 TIMES DAILY
Qty: 20 TABLET | Refills: 0 | Status: SHIPPED | OUTPATIENT
Start: 2023-12-03 | End: 2023-12-13

## 2023-12-03 RX ADMIN — CEFAZOLIN SODIUM 1 G: 1 INJECTION, SOLUTION INTRAVENOUS at 04:45

## 2023-12-03 RX ADMIN — ROSUVASTATIN CALCIUM 20 MG: 20 TABLET, FILM COATED ORAL at 09:19

## 2023-12-03 RX ADMIN — PANTOPRAZOLE SODIUM 40 MG: 40 TABLET, DELAYED RELEASE ORAL at 05:46

## 2023-12-03 RX ADMIN — AMLODIPINE BESYLATE 2.5 MG: 5 TABLET ORAL at 09:19

## 2023-12-03 RX ADMIN — INSULIN LISPRO 8 UNITS: 100 INJECTION, SOLUTION INTRAVENOUS; SUBCUTANEOUS at 09:17

## 2023-12-03 RX ADMIN — LISINOPRIL 40 MG: 20 TABLET ORAL at 09:19

## 2023-12-03 RX ADMIN — ASPIRIN 81 MG: 81 TABLET, COATED ORAL at 09:19

## 2023-12-03 RX ADMIN — HYDROCHLOROTHIAZIDE 6.25 MG: 25 TABLET ORAL at 09:19

## 2023-12-03 RX ADMIN — CHOLECALCIFEROL TAB 25 MCG (1000 UNIT) 2000 UNITS: 25 TAB at 06:00

## 2023-12-03 RX ADMIN — BISOPROLOL FUMARATE 10 MG: 5 TABLET, FILM COATED ORAL at 09:19

## 2023-12-03 RX ADMIN — EMPAGLIFLOZIN 10 MG: 10 TABLET, FILM COATED ORAL at 09:19

## 2023-12-03 ASSESSMENT — COGNITIVE AND FUNCTIONAL STATUS - GENERAL
DAILY ACTIVITIY SCORE: 24
DAILY ACTIVITIY SCORE: 24
MOBILITY SCORE: 24
MOBILITY SCORE: 24

## 2023-12-03 ASSESSMENT — PAIN SCALES - GENERAL
PAINLEVEL_OUTOF10: 0 - NO PAIN
PAINLEVEL_OUTOF10: 0 - NO PAIN

## 2023-12-03 NOTE — CARE PLAN
The patient's goals for the shift include      Problem: Pain - Adult  Goal: Verbalizes/displays adequate comfort level or baseline comfort level  Outcome: Progressing     Problem: Safety - Adult  Goal: Free from fall injury  Outcome: Progressing     Problem: Discharge Planning  Goal: Discharge to home or other facility with appropriate resources  Outcome: Progressing       The clinical goals for the shift include     Problem: Diabetes  Goal: Maintain glucose levels >70mg/dl to <250mg/dl throughout shift  Outcome: Progressing     Problem: Diabetes  Goal: No changes in neurological exam by end of shift  Outcome: Progressing    Problem: Chronic Conditions and Co-morbidities  Goal: Patient's chronic conditions and co-morbidity symptoms are monitored and maintained or improved  Outcome: Progressing

## 2023-12-03 NOTE — PROGRESS NOTES
"Tanya Fan is a 78 y.o. female on day 1 of admission presenting with Sialoadenitis.    Subjective   Interval History:   Much improved left sided parotid enlargement today. Minimal pain       Objective   Physical Exam    Range of Vitals (last 24 hours)  BP (!) 115/49 (BP Location: Left arm, Patient Position: Lying)   Pulse 61   Temp 36.2 °C (97.2 °F) (Temporal)   Resp 17   Ht 1.626 m (5' 4\")   Wt 74.8 kg (165 lb)   SpO2 96%   BMI 28.32 kg/m²   Daily Weight  11/29/23 : 74.8 kg (165 lb)    Body mass index is 28.32 kg/m².    General: AAOx3, NAD, nontoxic appearing  HEENT: left parotid with minimal swelling and TTP  Lungs: normal respiratory effort      Current Facility-Administered Medications:     acetaminophen (Tylenol) tablet 650 mg, 650 mg, oral, q4h PRN, 650 mg at 11/30/23 0932 **OR** acetaminophen (Tylenol) oral liquid 650 mg, 650 mg, oral, q4h PRN **OR** acetaminophen (Tylenol) suppository 650 mg, 650 mg, rectal, q4h PRN, TODD Pino    amLODIPine (Norvasc) tablet 2.5 mg, 2.5 mg, oral, Daily, TODD Pino, 2.5 mg at 12/02/23 0836    aspirin EC tablet 81 mg, 81 mg, oral, Daily, TODD Pino, 81 mg at 12/02/23 0836    bisoprolol (Zebeta) tablet 10 mg, 10 mg, oral, Daily, Mary Jane Sultana, PharmD, 10 mg at 12/02/23 0836    ceFAZolin in dextrose (iso-os) (Ancef) IVPB 1 g, 1 g, intravenous, q8h, TODD Pino, Stopped at 12/03/23 0515    cholecalciferol (Vitamin D-3) tablet 2,000 Units, 2,000 Units, oral, Daily, TODD Pino, 2,000 Units at 12/03/23 0600    dextrose 10 % in water (D10W) infusion, 0.3 g/kg/hr, intravenous, Once PRN, TODD Pino    dextrose 50 % injection 25 g, 25 g, intravenous, q15 min PRN, TODD Pino    empagliflozin (Jardiance) tablet 10 mg, 10 mg, oral, Daily, TODD Pino, 10 mg at 12/02/23 0836    enoxaparin (Lovenox) syringe 40 mg, 40 mg, subcutaneous, q24h, Mirella Silva" Wolinski, APRN-CNP, 40 mg at 12/02/23 1403    glucagon (Glucagen) injection 1 mg, 1 mg, intramuscular, q15 min PRN, TODD Pino    hydroCHLOROthiazide (HYDRODiuril) tablet 6.25 mg, 6.25 mg, oral, Daily, Mary Jane Sultana PharmD, 6.25 mg at 12/02/23 0834    HYDROmorphone (Dilaudid) injection 0.4 mg, 0.4 mg, intravenous, q3h PRN, Macy Brady MD, 0.4 mg at 12/01/23 1036    insulin lispro (HumaLOG) injection 0-10 Units, 0-10 Units, subcutaneous, TID with meals, TODD Pino, 10 Units at 12/02/23 1624    lisinopril tablet 40 mg, 40 mg, oral, Daily, TODD Pino, 40 mg at 12/02/23 0836    [Held by provider] metFORMIN XR (Glucophage-XR) 24 hr tablet 1,000 mg, 1,000 mg, oral, BID, TODD Pino    ondansetron (Zofran) tablet 4 mg, 4 mg, oral, q8h PRN **OR** ondansetron (Zofran) injection 4 mg, 4 mg, intravenous, q8h PRN, TODD Pino    oxyCODONE (Roxicodone) immediate release tablet 5 mg, 5 mg, oral, q6h PRN, TODD Pino    pantoprazole (ProtoNix) EC tablet 40 mg, 40 mg, oral, Daily before breakfast, Arelis Tristan PA-C, 40 mg at 12/03/23 0546    polyethylene glycol (Glycolax, Miralax) packet 17 g, 17 g, oral, Daily, TODD Pino, 17 g at 12/01/23 0841    rosuvastatin (Crestor) tablet 20 mg, 20 mg, oral, Daily, TODD Pino, 20 mg at 12/02/23 0836    vancomycin (Vancocin) 1.25 g in dextrose 5 % in water (D5W) 250 mL IV, 1,250 mg, intravenous, q24h, Niyah Goff, PharmD, Stopped at 12/02/23 6990    Relevant Results  Labs:  Lab Results   Component Value Date    WBC 13.2 (H) 12/03/2023    HGB 11.9 (L) 12/03/2023    HCT 36.7 12/03/2023    MCV 87 12/03/2023     12/03/2023     Lab Results   Component Value Date    GLUCOSE 246 (H) 12/03/2023    CALCIUM 9.1 12/03/2023     12/03/2023    K 3.9 12/03/2023    CO2 21 12/03/2023     12/03/2023    BUN 31 (H) 12/03/2023    CREATININE 0.89 12/03/2023  "    Results from last 72 hours   Lab Units 12/02/23  0351   ALBUMIN g/dL 3.9     Estimated Creatinine Clearance: 53.2 mL/min (by C-G formula based on SCr of 0.89 mg/dL).  No results found for: \"CRP\"    Micro:  No results found for the last 7 days.      Imaging:  CT soft tissue neck w IV contrast    Result Date: 11/29/2023  Interpreted By:  Chi Sanchez, STUDY: CT SOFT TISSUE NECK W IV CONTRAST;  11/29/2023 4:14 pm   INDICATION: Signs/Symptoms:swelling left side of neck, difficulty swallowing.   COMPARISON: CT soft tissue neck 12/26/2019   ACCESSION NUMBER(S): [id number]   ORDERING CLINICIAN: LIGIA LONGORIA   TECHNIQUE: Axial CT images of the neck were obtained.  The patient received 75 cc Omnipaque 350 intravenous contrast agent. The images were reformatted in angled axial, coronal and sagittal planes.   FINDINGS: Oral Cavity, Pharynx and Larynx:  Evaluation oral cavity is limited by streak artifact from dental hardware.  The nasopharyngeal and oropharyngeal structures are unremarkable. The hypopharyngeal and laryngeal structures are unremarkable.   Retropharyngeal and Prevertebral Soft Tissues: Unremarkable.   Lymph nodes: There are few non specific bilateral neck nodes, probably reactive in etiology.   Neck vessels: Bilateral neck vessels are normal in course and caliber and appear patent.   Thyroid gland: Several low-attenuation nodules scattered throughout the thyroid gland with the largest measuring 8 mm.   Parotid and submandibular glands: Innumerable calcifications scattered throughout bilateral parotid glands, more prominent on the left. There is asymmetric enlargement of the left parotid gland with diffuse soft tissue stranding and adjacent platysmal muscle thickening. Findings are concerning for acute sialoadenitis secondary to several sialoliths. No dilation of Stensen duct. Several calcification within the right parotid gland without acute inflammatory changes. Bilateral submandibular glands are " unremarkable.   Paranasal Sinuses and Mastoids: Left maxillary sinus mucous retention cyst. Mild paranasal sinus mucosal thickening otherwise.   Visualized orbital structures are unremarkable. Status post right frontotemporal craniotomy.   Visualized upper lungs are clear.   Moderate to severe multilevel degenerative changes of the cervical spine most prominent at C4-C5.       Several bilateral parotid gland sialoliths, with heterogeneous enlargement, and surrounding soft tissue stranding involving the left parotid gland, compatible with acute sialoadenitis due to sialolithiasis. No Stensen duct dilation. No suspicious cervical adenopathy.     MACRO: None   Signed by: Chi Sanchez 11/29/2023 4:34 PM Dictation workstation:   [id number]    No results found for this or any previous visit from the past 1095 days.        Assessment:  Left sialadenitis--improved on vancomycin/cefazolin    Plan/Recommendations:  Would be ok for discharge from my standpoint on PO abx if she has follow up this week with ENT--oral abx of choice for me based on her allergies would be PO linezolid 600mg BID x10 total days of abx (MRSA, strep coverage). This may be cost prohibitive so would recommend a goodRx coupon printed for her    Monet Wise DO  [facility]  #[phone]

## 2023-12-03 NOTE — PROGRESS NOTES
Tanya Fan is a 78 y.o. female on day 1 of admission presenting with Sialoadenitis.       Patient has active discharge orders. She is going home, denies any needs going home.

## 2023-12-03 NOTE — CARE PLAN
The patient's goals for the shift include      The clinical goals for the shift include pt will have pain relief

## 2023-12-03 NOTE — DISCHARGE SUMMARY
Discharge Diagnosis  Sialoadenitis    Discharge Meds     Your medication list        START taking these medications        Instructions Last Dose Given Next Dose Due   linezolid 600 mg tablet  Commonly known as: Zyvox      Take 1 tablet (600 mg) by mouth 2 times a day for 10 days.       oxyCODONE 5 mg immediate release tablet  Commonly known as: Roxicodone      Take 1 tablet (5 mg) by mouth every 6 hours if needed for moderate pain (4 - 6).              CONTINUE taking these medications        Instructions Last Dose Given Next Dose Due   AdviL 200 mg tablet  Generic drug: ibuprofen           amLODIPine 2.5 mg tablet  Commonly known as: Norvasc      Take 1 tablet (2.5 mg) by mouth once daily. Do not start before November 1, 2023.       aspirin 81 mg EC tablet           b complex vitamins capsule           bisoproloL-hydrochlorothiazide 10-6.25 mg tablet  Commonly known as: Ziac      TAKE 1 AND 1/2 TABLETS BY MOUTH  DAILY WITH BREAKFAST Do not start before November 1, 2023.       cholecalciferol 50 mcg (2,000 unit) capsule  Commonly known as: Vitamin D-3           clindamycin 300 mg capsule  Commonly known as: Cleocin      Take 1 capsule (300 mg) by mouth 3 times a day for 10 days.       estradiol 0.01 % (0.1 mg/gram) vaginal cream  Commonly known as: Estrace           Jardiance 10 mg  Generic drug: empagliflozin      TAKE 1 TABLET BY MOUTH ONCE  DAILY (DO NOT START BEFORE  NOVEMBER 1, 2023)       lisinopril 40 mg tablet      Take 1 tablet (40 mg) by mouth once daily. Do not start before November 1, 2023.       metFORMIN  mg 24 hr tablet  Commonly known as: Glucophage-XR      TAKE 2 TABLETS BY MOUTH TWICE  DAILY       rosuvastatin 20 mg tablet  Commonly known as: Crestor           sulfamethoxazole-trimethoprim 400-80 mg tablet  Commonly known as: Bactrim      Take 1 tablet by mouth every other day.                 Where to Get Your Medications        These medications were sent to [id number] -  [facility], [place] - [address] [facility] [address] [place]  [address], [facility] [address]      Phone: [phone]   linezolid 600 mg tablet  oxyCODONE 5 mg immediate release tablet         Test Results Pending At Discharge  Pending Labs       No current pending labs.            Hospital Course  Tanya Fan is a 78 y.o. female presenting with swelling of the left side of her neck.  Patient recently was diagnosed with COVID on Friday, November 24.  She noticed swelling of the left side of her neck.  Patient states she noticed worsening pain overnight.  She did call her primary care doctor and she put her on clindamycin due to multiple allergies.  She told her to suck on sour candies and follow-up with ENT.  She called Dr. Mclain to make an appointment but he could not see her today.  Her pain got worse so she came into the ER.  CT neck shows multiple stones with stranding around the parotid gland.  Patient was admitted for pain control and IV antibiotics.  ER called ENT down at [facility] they said there was no need to transfer her down to [facility].  Admit for pain control along with IV antibiotics and close ENT follow-up outpatient.     1) Acute Sialoadenitis 2/2 Sialolithiasis  - CT neck: Several bilateral parotid gland sialoliths, with heterogeneous enlargement, and surrounding soft tissue stranding involving the left parotid gland, compatible with acute sialoadenitis due to sialolithiasis. No Stensen duct dilation. No suspicious cervical adenopathy   - ENT evaluated pt in ED upon admission, still in contact with Dr. Pabon via [address] Secure Chat. Recommending IR to US with intention to percutaneous drain today since patient not improved.  - ID consulted, Vancomycin and Cefazolin IV  - IR consulted - > not enough to drain.  - WBC 14.6>14.4  - Hgb stable 12.4>11.9  - Pain control, continue IV abx  - F/U with Dr. Mclain outpatient at discharge     2) DM2  3) HTN  - holding  metformin d/t contrast, resume 12/2  - continue jardiance and other home meds  - SSI, hypoglycemic protocol, optimize glycemic control  - monitor BP and HR, currently stable     DVT ppx - lovenox     Dispo - pending id recs for iv abx. Reached out via secure chat. May be candidate for hosp at home if need cont iv abx.   -- ok to dc on oral abx per dr sotelo     Pertinent Physical Exam At Time of Discharge  Physical Exam    Constitutional:       General: She is awake. She is not in acute distress.     Appearance: She is not toxic-appearing.   HENT:      Head: Normocephalic and atraumatic.      Right Ear: External ear normal.      Left Ear: External ear normal.      Nose: Nose normal.      Mouth/Throat:      Mouth: Mucous membranes are moist.   Eyes:      Extraocular Movements: Extraocular movements intact.      Conjunctiva/sclera: Conjunctivae normal.      Pupils: Pupils are equal, round, and reactive to light.   Neck:      Comments: Prominent erythematous mass around left side jaw  Cardiovascular:      Rate and Rhythm: Normal rate and regular rhythm.      Pulses: Normal pulses.      Heart sounds: Normal heart sounds. No murmur heard.  Pulmonary:      Effort: Pulmonary effort is normal. No respiratory distress.      Breath sounds: Normal breath sounds. No wheezing, rhonchi or rales.   Abdominal:      General: Abdomen is flat. Bowel sounds are normal. There is no distension.      Palpations: Abdomen is soft. There is no hepatomegaly or pulsatile mass.      Tenderness: There is no abdominal tenderness. There is no guarding.   Musculoskeletal:         General: No signs of injury. Normal range of motion.      Cervical back: Normal range of motion.      Right lower leg: No edema.      Left lower leg: No edema.   Skin:     General: Skin is warm and dry.      Findings: No bruising, erythema, lesion or rash.   Neurological:      General: No focal deficit present.      Mental Status: She is alert and oriented to person,  place, and time. Mental status is at baseline.      Cranial Nerves: Cranial nerves 2-12 are intact. No cranial nerve deficit.   Psychiatric:         Mood and Affect: Mood normal.         Behavior: Behavior normal.         Thought Content: Thought content normal.         Judgment: Judgment normal.     Outpatient Follow-Up  Future Appointments   Date Time Provider Department Center   3/1/2024 10:00 AM Marquise Garcia MD [id number] [facility]   4/5/2024  8:20 AM Mathew Sanford MD [id number] [facility]   Close fu with libby Holden, APRN-CNP

## 2023-12-04 ENCOUNTER — TELEPHONE (OUTPATIENT)
Dept: OTOLARYNGOLOGY | Facility: HOSPITAL | Age: 78
End: 2023-12-04
Payer: MEDICARE

## 2023-12-04 ENCOUNTER — PATIENT OUTREACH (OUTPATIENT)
Dept: PRIMARY CARE | Facility: CLINIC | Age: 78
End: 2023-12-04
Payer: MEDICARE

## 2023-12-04 NOTE — PROGRESS NOTES
Discharge Facility:  [facility]  Discharge Diagnosis: sialotihiasis  Admission Date:  11/29/23  Discharge Date:   12/3/23    PCP Appointment Date:  12/5/23    Specialist Appointment Date:   ENT D    Hospital Encounter and Summary: Linked   See discharge assessment below for further details     Engagement  Call Start Time: 1019 (12/4/2023 10:21 AM)    Medications  Medications reviewed with patient/caregiver?: Yes (12/4/2023 10:21 AM)  Is the patient having any side effects they believe may be caused by any medication additions or changes?: No (12/4/2023 10:21 AM)  Does the patient have all medications ordered at discharge?: Yes (12/4/2023 10:21 AM)  Is the patient taking all medications as directed (includes completed medication regime)?: Yes (12/4/2023 10:21 AM)  Medication Comments: reviewed new, changed, and discontinued meds.  antibiotic and oxycodone are new. (12/4/2023 10:21 AM)    Appointments  Does the patient have a primary care provider?: Yes (12/4/2023 10:21 AM)  Care Management Interventions: -- (appt made 12/5/23) (12/4/2023 10:21 AM)  Has the patient kept scheduled appointments due by today?: Yes (12/4/2023 10:21 AM)  Care Management Interventions: Advised to schedule with specialist (tanya is awaiting call back from ENT office.) (12/4/2023 10:21 AM)    Self Management  Has home health visited the patient within 72 hours of discharge?: Not applicable (12/4/2023 10:21 AM)    Patient Teaching  Does the patient have access to their discharge instructions?: Yes (12/4/2023 10:21 AM)  What is the patient's perception of their health status since discharge?: Same (12/4/2023 10:21 AM)  Is the patient/caregiver able to teach back the hierarchy of who to call/visit for symptoms/problems? PCP, Specialist, Home Health nurse, Urgent Care, ED, 911: Yes (12/4/2023 10:21 AM)  Patient/Caregiver Education Comments: spoke with Tanya.  states feels the same.  still has swelling and pain in the area.  pain 5/10.  medicated  with advil at 9 am.  discussed pain managment.  appt made with PCP for tomorrow.  prefers callback later this week, rather than writing down contact info. (12/4/2023 10:21 AM)

## 2023-12-04 NOTE — TELEPHONE ENCOUNTER
Patient states she was seen in house and you told her she would be seen in the office today.  Please advise.

## 2023-12-05 ENCOUNTER — OFFICE VISIT (OUTPATIENT)
Dept: OTOLARYNGOLOGY | Facility: HOSPITAL | Age: 78
End: 2023-12-05
Payer: MEDICARE

## 2023-12-05 ENCOUNTER — APPOINTMENT (OUTPATIENT)
Dept: PRIMARY CARE | Facility: CLINIC | Age: 78
End: 2023-12-05
Payer: MEDICARE

## 2023-12-05 VITALS — HEIGHT: 64 IN | TEMPERATURE: 97.2 F | BODY MASS INDEX: 26.58 KG/M2 | WEIGHT: 155.7 LBS

## 2023-12-05 DIAGNOSIS — R22.1 NECK SWELLING: ICD-10-CM

## 2023-12-05 DIAGNOSIS — K11.21 PAROTITIS, ACUTE: Primary | ICD-10-CM

## 2023-12-05 DIAGNOSIS — K11.5 PAROTID SIALOLITHIASIS: ICD-10-CM

## 2023-12-05 PROCEDURE — 99213 OFFICE O/P EST LOW 20 MIN: CPT | Performed by: STUDENT IN AN ORGANIZED HEALTH CARE EDUCATION/TRAINING PROGRAM

## 2023-12-05 PROCEDURE — 1125F AMNT PAIN NOTED PAIN PRSNT: CPT | Performed by: STUDENT IN AN ORGANIZED HEALTH CARE EDUCATION/TRAINING PROGRAM

## 2023-12-05 PROCEDURE — 1159F MED LIST DOCD IN RCRD: CPT | Performed by: STUDENT IN AN ORGANIZED HEALTH CARE EDUCATION/TRAINING PROGRAM

## 2023-12-05 PROCEDURE — 1111F DSCHRG MED/CURRENT MED MERGE: CPT | Performed by: STUDENT IN AN ORGANIZED HEALTH CARE EDUCATION/TRAINING PROGRAM

## 2023-12-05 PROCEDURE — 1160F RVW MEDS BY RX/DR IN RCRD: CPT | Performed by: STUDENT IN AN ORGANIZED HEALTH CARE EDUCATION/TRAINING PROGRAM

## 2023-12-05 PROCEDURE — 1036F TOBACCO NON-USER: CPT | Performed by: STUDENT IN AN ORGANIZED HEALTH CARE EDUCATION/TRAINING PROGRAM

## 2023-12-05 ASSESSMENT — ENCOUNTER SYMPTOMS
NUMBNESS: 0
SHORTNESS OF BREATH: 0
ACTIVITY CHANGE: 0
CHOKING: 0
UNEXPECTED WEIGHT CHANGE: 0
SINUS PRESSURE: 0
SORE THROAT: 0
FACIAL SWELLING: 1
VOICE CHANGE: 0
FACIAL ASYMMETRY: 0
NECK PAIN: 0
RHINORRHEA: 0
SINUS PAIN: 0
ADENOPATHY: 0
COUGH: 0
STRIDOR: 0
APPETITE CHANGE: 0
TROUBLE SWALLOWING: 0

## 2023-12-05 NOTE — PROGRESS NOTES
History Of Present Illness  Tanya Fan is a 78 y.o. female presenting with left neck swelling, seen in the hospital for parotitis/sialithiasis.    ENT consult 11/30/23  Ms. Fan is a 77 yo female who presented to the ED with left sided neck pain and swelling, ENT was consulted for concern for parotitis. CT shows evidence of bilateral stones within the parotid glands, particularly in the left where it is associated with enlargement and fat stranding, however there is currently no appreciable collection amenable to drainage. No appreciable stone in Stensen's duct and clear saliva without purulence expressed upon palpation. Constellation of symptoms compatible with parotitis without hussain abscess or significant obstruction of Stensen's duct.   Hospital course- IR was consulted but stated there was not enough to drain  Discharged from the hospital 12/3 after a 6 day hospital stay.  Complete resolution of pain. Still remains swollen but its decreased. She is on Linezolid for a 10 day course.  Of note, several years ago had salivary gland infection- Saw ENT and treated with abx and it resolved. On routine follow up, FNA revealed:     2/2020 FNA  A.   FINE NEEDLE ASPIRATION PAROTID GLAND - RIGHT:        CELLULAR FEATURES CONSISTENT WITH CYST CONTENTS WITH FOCAL FEATURES SUGGESTIVE OF WARTHIN'S TUMOR. SEE NOTE.     This does NOT appear on current imaging.    Past Medical History  She has a past medical history of Acute pharyngitis, unspecified (05/07/2016), Acute pharyngitis, unspecified (05/07/2016), Acute upper respiratory infection, unspecified (06/02/2016), Contusion of unspecified knee, initial encounter (07/30/2015), Dysuria (04/09/2015), Encounter for full-term uncomplicated delivery, Other disorders of nervous system (04/04/2014), Personal history of other diseases of the circulatory system, Personal history of other diseases of the respiratory system (05/12/2016), Personal history of pneumonia (recurrent)  (02/14/2020), Personal history of urinary (tract) infections (04/13/2015), Recurrent UTI (04/04/2023), Unspecified conjunctivitis (05/27/2016), and Varicella without complication.    Surgical History  She has a past surgical history that includes Other surgical history (11/14/2013); Bladder surgery (11/14/2013); Hysterectomy (11/14/2013); Foot surgery (11/14/2013); Other surgical history (07/25/2019); Other surgical history (07/25/2019); Other surgical history (07/25/2019); Hand surgery (12/13/2013); MR angio head wo IV contrast (6/27/2019); and MR angio neck wo IV contrast (6/27/2019).     Social History  She reports that she has never smoked. She has never used smokeless tobacco. She reports that she does not drink alcohol and does not use drugs.    Family History  Family History   Problem Relation Name Age of Onset    Stroke Father      Diabetes Other Family     Hypertension Other Family         Allergies  Amoxicillin-pot clavulanate, Doxycycline, and Aspartame    Review of Systems  Review of Systems   Constitutional:  Negative for activity change, appetite change and unexpected weight change.   HENT:  Positive for facial swelling (left parotid swelling DECREASED since hospital stay). Negative for congestion, ear pain, hearing loss, mouth sores, postnasal drip, rhinorrhea, sinus pressure, sinus pain, sore throat, tinnitus, trouble swallowing and voice change.    Eyes:  Negative for visual disturbance.   Respiratory:  Negative for cough, choking, shortness of breath and stridor.    Musculoskeletal:  Negative for neck pain.   Skin:  Negative for rash.   Neurological:  Negative for facial asymmetry and numbness.   Hematological:  Negative for adenopathy.   All other systems reviewed and are negative.       Physical Exam  Physical Exam:  CONSTITUTIONAL:  No acute distress  VOICE:  No hoarseness or other abnormality  RESPIRATION:  Breathing comfortably, no stridor  CV:  No clubbing/cyanosis/edema in hands  EYES:  EOM  intact, sclera normal  NEURO:  Alert and oriented times 3, Cranial nerves II-XII grossly intact and symmetric bilaterally  HEAD AND FACE:  Symmetric facial features, no masses or lesions, sinuses non-tender to palpation  SALIVARY GLANDS:  submandibular glands normal bilaterally. Left tail of parotid remains enlarged with mild erythema, nontender to palpation. Clear saliva expressed via Stensons duct.  EARS:  Normal external ears, external auditory canals, and TMs to otoscopy, normal hearing to whispered voice.  NOSE:  External nose midline, anterior rhinoscopy is normal with limited visualization to the anterior aspect of the interior turbinates, no bleeding or drainage, no lesions  ORAL CAVITY/OROPHARYNX/LIPS:  Normal mucous membranes, normal floor of mouth/tongue/OP, no masses or lesions  PHARYNGEAL WALLS:  No masses or lesions  NECK/LYMPH:  No LAD, no thyroid masses, trachea midline  SKIN:  Neck skin is without scar or injury  PSYCH:  Alert and oriented with appropriate mood and affect      Relevant Results  === 11/29/23 ===    CT SOFT TISSUE NECK W IV CONTRAST    - Impression -  Several bilateral parotid gland sialoliths, with heterogeneous  enlargement, and surrounding soft tissue stranding involving the left  parotid gland, compatible with acute sialoadenitis due to  sialolithiasis. No Stensen duct dilation. No suspicious cervical  adenopathy.    Assessment/Plan     78y female presents for left parotid swelling following recent hospital stay for parotitis. Pain has completely resolved but gland remains swollen (but decreased). Discussed with the patient that I would expect the swelling to continue to decrease, and if it remains enlarged and firm, may consider repeat imaging and possible biopsy.  Also discussed continued conservative/medical management.     Return in 1 month

## 2023-12-06 ENCOUNTER — PATIENT OUTREACH (OUTPATIENT)
Dept: PRIMARY CARE | Facility: CLINIC | Age: 78
End: 2023-12-06
Payer: MEDICARE

## 2023-12-06 NOTE — PROGRESS NOTES
Call regarding appt. With ENT on 12/5/23  after hospitalization.  At time of outreach call the patient feels as if their condition has remained unchanged  since last visit.   States cancelled with PCP and saw ENT yesterday.  Lump is unchanged, no longer having pain.  Has a week left of antibiotics.  Will address with ENT when ATB complete, if needs arise.

## 2023-12-11 DIAGNOSIS — R11.0 NAUSEA: Primary | ICD-10-CM

## 2023-12-11 DIAGNOSIS — R11.0 NAUSEA: ICD-10-CM

## 2023-12-11 RX ORDER — ONDANSETRON 4 MG/1
4 TABLET, FILM COATED ORAL EVERY 8 HOURS PRN
Qty: 20 TABLET | Refills: 0 | Status: SHIPPED | OUTPATIENT
Start: 2023-12-11 | End: 2023-12-11 | Stop reason: SDUPTHER

## 2023-12-11 RX ORDER — ONDANSETRON 4 MG/1
4 TABLET, FILM COATED ORAL EVERY 8 HOURS PRN
Qty: 20 TABLET | Refills: 0 | Status: SHIPPED | OUTPATIENT
Start: 2023-12-11 | End: 2023-12-18

## 2023-12-13 DIAGNOSIS — R19.7 DIARRHEA, UNSPECIFIED TYPE: Primary | ICD-10-CM

## 2024-01-02 ENCOUNTER — PATIENT OUTREACH (OUTPATIENT)
Dept: PRIMARY CARE | Facility: CLINIC | Age: 79
End: 2024-01-02
Payer: MEDICARE

## 2024-01-02 ENCOUNTER — APPOINTMENT (OUTPATIENT)
Dept: OTOLARYNGOLOGY | Facility: HOSPITAL | Age: 79
End: 2024-01-02
Payer: MEDICARE

## 2024-01-02 ENCOUNTER — TELEPHONE (OUTPATIENT)
Dept: CARDIOLOGY | Facility: CLINIC | Age: 79
End: 2024-01-02
Payer: MEDICARE

## 2024-01-02 DIAGNOSIS — E11.9 TYPE 2 DIABETES MELLITUS WITHOUT COMPLICATION, WITHOUT LONG-TERM CURRENT USE OF INSULIN (MULTI): ICD-10-CM

## 2024-01-02 NOTE — PROGRESS NOTES
Call placed regarding one month post discharge follow up call.  At time of outreach call the patient feels as if their condition has improved  since initial visit with PCP or specialist.    States almost at 100%.  No questions or concerns at this time.

## 2024-01-04 ENCOUNTER — OFFICE VISIT (OUTPATIENT)
Dept: OTOLARYNGOLOGY | Facility: CLINIC | Age: 79
End: 2024-01-04
Payer: MEDICARE

## 2024-01-04 VITALS — BODY MASS INDEX: 27.31 KG/M2 | WEIGHT: 160 LBS | HEIGHT: 64 IN

## 2024-01-04 DIAGNOSIS — H61.23 BILATERAL IMPACTED CERUMEN: ICD-10-CM

## 2024-01-04 DIAGNOSIS — K11.21 PAROTITIS, ACUTE: Primary | ICD-10-CM

## 2024-01-04 DIAGNOSIS — K11.5 PAROTID SIALOLITHIASIS: ICD-10-CM

## 2024-01-04 PROCEDURE — 1125F AMNT PAIN NOTED PAIN PRSNT: CPT | Performed by: OTOLARYNGOLOGY

## 2024-01-04 PROCEDURE — 1036F TOBACCO NON-USER: CPT | Performed by: OTOLARYNGOLOGY

## 2024-01-04 PROCEDURE — 69210 REMOVE IMPACTED EAR WAX UNI: CPT | Performed by: OTOLARYNGOLOGY

## 2024-01-04 PROCEDURE — 99203 OFFICE O/P NEW LOW 30 MIN: CPT | Performed by: OTOLARYNGOLOGY

## 2024-01-04 NOTE — PROGRESS NOTES
Subjective   Patient ID: Tanya Fan is a 78 y.o. female  HPI  Patient had a severe swelling in the parotid gland on the left side approximately 1 month ago.  She presented to the emergency room and was admitted to the hospital for 6 days with IV antibiotics.  She is feeling much better and the swelling and pain have resolved and she presents for follow-up.  She has no hemoptysis or dysphagia.  She does not have any pain in her cheeks and she has not noticed any swelling in her neck.    Review of Systems   HENT:  Positive for nosebleeds and postnasal drip.        Objective   Physical Exam  The following elements of a brief ear nose and throat exam were performed: External ear canals and tympanic membranes, external nose and nasal passages, oral cavity, palpation of the neck, percussion of the face, palpation of the thyroid.    There is cerumen impaction bilaterally and this was cleared using speculum and curettes.  There is some diffuse enlargement of the parotid glands noted bilaterally and the saliva emanating from the parotid ducts and submandibular duct is clear.  The CT scan of the neck dated November 29, 2023 was reviewed and the ultrasound of the neck dated November 30, 2023 were reviewed and her admission records from November 29, 2023 were also reviewed and she was noted to have multiple sialoliths bilaterally..    Ear cerumen removal    Date/Time: 1/4/2024 10:32 AM    Performed by: Freddie Gonzales MD  Authorized by: Freddie Gonzales MD    Consent:     Consent obtained:  Verbal    Risks discussed:  Pain  Procedure details:     Location:  L ear and R ear    Procedure type: curette        Assessment/Plan   Diagnoses and all orders for this visit:  Parotitis, acute (Primary)  Parotid sialolithiasis  Bilateral impacted cerumen  Other orders  -     Ear cerumen removal     1.  Acute otitis on the left side and sialolithiasis resolved after treatment with IV antibiotics.  The patient was advised to increase the  water intake and apply massage to the salivary glands and use lemon candy to promote salivation.  2.  Bilateral cerumen impaction cleared today.  She will follow-up as needed.

## 2024-01-11 ENCOUNTER — APPOINTMENT (OUTPATIENT)
Dept: OTOLARYNGOLOGY | Facility: CLINIC | Age: 79
End: 2024-01-11
Payer: MEDICARE

## 2024-01-15 DIAGNOSIS — M54.2 NECK PAIN: Primary | ICD-10-CM

## 2024-01-23 ENCOUNTER — HOSPITAL ENCOUNTER (OUTPATIENT)
Dept: RADIOLOGY | Facility: CLINIC | Age: 79
Discharge: HOME | End: 2024-01-23
Payer: MEDICARE

## 2024-01-23 DIAGNOSIS — M54.2 NECK PAIN: ICD-10-CM

## 2024-01-23 PROCEDURE — 72040 X-RAY EXAM NECK SPINE 2-3 VW: CPT | Performed by: RADIOLOGY

## 2024-01-23 PROCEDURE — 72040 X-RAY EXAM NECK SPINE 2-3 VW: CPT

## 2024-02-12 ENCOUNTER — LAB (OUTPATIENT)
Dept: LAB | Facility: LAB | Age: 79
End: 2024-02-12
Payer: MEDICARE

## 2024-02-12 DIAGNOSIS — N30.00 ACUTE CYSTITIS WITHOUT HEMATURIA: ICD-10-CM

## 2024-02-12 PROCEDURE — 87086 URINE CULTURE/COLONY COUNT: CPT

## 2024-02-12 PROCEDURE — 87186 SC STD MICRODIL/AGAR DIL: CPT

## 2024-02-14 ENCOUNTER — TELEPHONE (OUTPATIENT)
Dept: INTERNAL MEDICINE | Facility: HOSPITAL | Age: 79
End: 2024-02-14
Payer: MEDICARE

## 2024-02-14 LAB — BACTERIA UR CULT: ABNORMAL

## 2024-02-14 NOTE — TELEPHONE ENCOUNTER
Spoke to the patient.  She had some mild symptoms of UTI on Monday but they have completely resolved.  We discussed the complexity of treating this organism with no oral options.  She is currently asymptomatic.  She will call me again if symptoms develop.  For now no treatment indicated

## 2024-02-19 DIAGNOSIS — E78.2 HYPERLIPIDEMIA, MIXED: ICD-10-CM

## 2024-02-19 RX ORDER — ROSUVASTATIN CALCIUM 20 MG/1
20 TABLET, COATED ORAL DAILY
Qty: 100 TABLET | Refills: 2 | Status: SHIPPED | OUTPATIENT
Start: 2024-02-19

## 2024-03-01 ENCOUNTER — APPOINTMENT (OUTPATIENT)
Dept: PRIMARY CARE | Facility: CLINIC | Age: 79
End: 2024-03-01
Payer: MEDICARE

## 2024-03-01 ENCOUNTER — PATIENT OUTREACH (OUTPATIENT)
Dept: PRIMARY CARE | Facility: CLINIC | Age: 79
End: 2024-03-01

## 2024-03-01 NOTE — PROGRESS NOTES
Patient has met target of no readmission for (90) days post hospital  discharge and is graduated from Transitional Care Management program at this time.    LVM with callback number.  Aware to follow up with PCP as needed/directed.

## 2024-03-07 ENCOUNTER — OFFICE VISIT (OUTPATIENT)
Dept: PRIMARY CARE | Facility: CLINIC | Age: 79
End: 2024-03-07
Payer: MEDICARE

## 2024-03-07 VITALS
TEMPERATURE: 96.6 F | HEART RATE: 69 BPM | DIASTOLIC BLOOD PRESSURE: 64 MMHG | HEIGHT: 64 IN | OXYGEN SATURATION: 98 % | WEIGHT: 158 LBS | BODY MASS INDEX: 26.98 KG/M2 | SYSTOLIC BLOOD PRESSURE: 116 MMHG

## 2024-03-07 DIAGNOSIS — M46.1 SACROILIITIS (CMS-HCC): ICD-10-CM

## 2024-03-07 DIAGNOSIS — I10 PRIMARY HYPERTENSION: ICD-10-CM

## 2024-03-07 DIAGNOSIS — K50.10 CROHN'S DISEASE OF LARGE INTESTINE WITHOUT COMPLICATION (MULTI): ICD-10-CM

## 2024-03-07 DIAGNOSIS — E78.2 HYPERLIPIDEMIA, MIXED: ICD-10-CM

## 2024-03-07 DIAGNOSIS — E66.3 OVERWEIGHT WITH BODY MASS INDEX (BMI) OF 27 TO 27.9 IN ADULT: ICD-10-CM

## 2024-03-07 DIAGNOSIS — E55.9 VITAMIN D DEFICIENCY: ICD-10-CM

## 2024-03-07 DIAGNOSIS — E11.9 TYPE 2 DIABETES MELLITUS WITHOUT COMPLICATION, WITHOUT LONG-TERM CURRENT USE OF INSULIN (MULTI): Primary | ICD-10-CM

## 2024-03-07 PROCEDURE — 3078F DIAST BP <80 MM HG: CPT | Performed by: INTERNAL MEDICINE

## 2024-03-07 PROCEDURE — 3074F SYST BP LT 130 MM HG: CPT | Performed by: INTERNAL MEDICINE

## 2024-03-07 PROCEDURE — 1036F TOBACCO NON-USER: CPT | Performed by: INTERNAL MEDICINE

## 2024-03-07 PROCEDURE — 1160F RVW MEDS BY RX/DR IN RCRD: CPT | Performed by: INTERNAL MEDICINE

## 2024-03-07 PROCEDURE — 1159F MED LIST DOCD IN RCRD: CPT | Performed by: INTERNAL MEDICINE

## 2024-03-07 PROCEDURE — 99214 OFFICE O/P EST MOD 30 MIN: CPT | Performed by: INTERNAL MEDICINE

## 2024-03-07 NOTE — PROGRESS NOTES
"Subjective   Patient ID: Tanya Fan is a 78 y.o. female who presents for Follow-up.    HPI   Patient presented to the office for regular follow up.   Patient is taking all of her medicine and denied for any symptoms.    Review of Systems   Constitutional:  Negative for activity change, appetite change, fatigue, fever and unexpected weight change.   HENT:  Negative for dental problem, ear discharge, hearing loss, nosebleeds, postnasal drip, sinus pain, sore throat, trouble swallowing and voice change.    Eyes:  Negative for photophobia, pain and visual disturbance.   Respiratory:  Negative for cough, chest tightness, shortness of breath, wheezing and stridor.    Cardiovascular:  Negative for chest pain, palpitations and leg swelling.   Gastrointestinal:  Negative for abdominal pain, blood in stool, constipation, diarrhea, nausea and vomiting.   Endocrine: Negative for polydipsia, polyphagia and polyuria.   Genitourinary:  Negative for decreased urine volume, dyspareunia, dysuria, flank pain, frequency, hematuria and urgency.   Musculoskeletal:  Negative for arthralgias, back pain, gait problem, joint swelling, myalgias and neck pain.   Skin:  Negative for color change, rash and wound.   Allergic/Immunologic: Negative for environmental allergies and food allergies.   Neurological:  Negative for dizziness, tremors, seizures, syncope, facial asymmetry, speech difficulty, weakness, numbness and headaches.   Hematological:  Negative for adenopathy.   Psychiatric/Behavioral:  Negative for behavioral problems, confusion, decreased concentration, dysphoric mood, hallucinations, self-injury, sleep disturbance and suicidal ideas. The patient is not nervous/anxious.      Objective   /64   Pulse 69   Temp 35.9 °C (96.6 °F)   Ht 1.626 m (5' 4\")   Wt 71.7 kg (158 lb)   SpO2 98%   BMI 27.12 kg/m²     Physical Exam  Vitals and nursing note reviewed.   Constitutional:       General: She is not in acute distress.     " Appearance: She is not ill-appearing or toxic-appearing.   HENT:      Head: Normocephalic and atraumatic.      Nose: Nose normal.   Eyes:      General:         Right eye: No discharge.         Left eye: No discharge.      Extraocular Movements: Extraocular movements intact.      Conjunctiva/sclera: Conjunctivae normal.      Pupils: Pupils are equal, round, and reactive to light.   Cardiovascular:      Rate and Rhythm: Normal rate and regular rhythm.      Pulses: Normal pulses.      Heart sounds: Normal heart sounds. No murmur heard.  Pulmonary:      Effort: Pulmonary effort is normal. No respiratory distress.      Breath sounds: Normal breath sounds. No stridor. No wheezing or rales.   Abdominal:      General: Bowel sounds are normal.      Palpations: Abdomen is soft.      Tenderness: There is no abdominal tenderness.   Musculoskeletal:         General: No swelling or deformity. Normal range of motion.      Cervical back: Normal range of motion and neck supple. No rigidity or tenderness.      Right lower leg: No edema.      Left lower leg: No edema.   Skin:     General: Skin is warm.      Coloration: Skin is not jaundiced.      Findings: No bruising, erythema or rash.   Neurological:      General: No focal deficit present.      Mental Status: She is alert and oriented to person, place, and time.      Cranial Nerves: No cranial nerve deficit.      Gait: Gait normal.   Psychiatric:         Mood and Affect: Mood normal.         Behavior: Behavior normal.         Thought Content: Thought content normal.         Judgment: Judgment normal.       Assessment/Plan   Problem List Items Addressed This Visit          Cardiac and Vasculature    Primary hypertension     Blood pressure today is 116/64.  Patient is on Amlodipine and Lisinopril.         Relevant Medications    amLODIPine (Norvasc) 2.5 mg tablet    lisinopril 40 mg tablet    Hyperlipidemia, mixed     Patient is on Rosuvastatin 20 mg oral tablet daily. Check Lipid  panel.            Endocrine/Metabolic    Type 2 diabetes mellitus without complication, without long-term current use of insulin (CMS/HCC) - Primary    Relevant Orders    Hemoglobin A1C (Completed)    Albumin, urine, random (Completed)    Lipid Panel (Completed)    Vitamin D deficiency    Relevant Orders    Vitamin D 25-Hydroxy,Total (for eval of Vitamin D levels) (Completed)    Overweight with body mass index (BMI) of 27 to 27.9 in adult     - Lifestyle modification which include daily exercise at least for 45 minute and Low Calorie intake of 1800- 2000 Kcal per day.            Gastrointestinal and Abdominal    Crohn's disease of large intestine without complication (CMS/HCC)     Asymptomatic and no GI symptoms at this time.            Musculoskeletal and Injuries    Sacroiliitis (CMS/HCC)     Controlled.

## 2024-03-13 ENCOUNTER — LAB (OUTPATIENT)
Dept: LAB | Facility: LAB | Age: 79
End: 2024-03-13
Payer: MEDICARE

## 2024-03-13 DIAGNOSIS — E55.9 VITAMIN D DEFICIENCY: ICD-10-CM

## 2024-03-13 DIAGNOSIS — E11.9 TYPE 2 DIABETES MELLITUS WITHOUT COMPLICATION, WITHOUT LONG-TERM CURRENT USE OF INSULIN (MULTI): ICD-10-CM

## 2024-03-13 LAB
25(OH)D3 SERPL-MCNC: 40 NG/ML (ref 30–100)
CHOLEST SERPL-MCNC: 134 MG/DL (ref 0–199)
CHOLESTEROL/HDL RATIO: 2.4
CREAT UR-MCNC: 57.1 MG/DL (ref 20–320)
EST. AVERAGE GLUCOSE BLD GHB EST-MCNC: 169 MG/DL
HBA1C MFR BLD: 7.5 %
HDLC SERPL-MCNC: 56.1 MG/DL
LDLC SERPL CALC-MCNC: 42 MG/DL
MICROALBUMIN UR-MCNC: 10.6 MG/L
MICROALBUMIN/CREAT UR: 18.6 UG/MG CREAT
NON HDL CHOLESTEROL: 78 MG/DL (ref 0–149)
TRIGL SERPL-MCNC: 182 MG/DL (ref 0–149)
VLDL: 36 MG/DL (ref 0–40)

## 2024-03-13 PROCEDURE — 82306 VITAMIN D 25 HYDROXY: CPT

## 2024-03-13 PROCEDURE — 83036 HEMOGLOBIN GLYCOSYLATED A1C: CPT

## 2024-03-13 PROCEDURE — 82570 ASSAY OF URINE CREATININE: CPT

## 2024-03-13 PROCEDURE — 80061 LIPID PANEL: CPT

## 2024-03-13 PROCEDURE — 36415 COLL VENOUS BLD VENIPUNCTURE: CPT

## 2024-03-13 PROCEDURE — 82043 UR ALBUMIN QUANTITATIVE: CPT

## 2024-03-13 RX ORDER — AMLODIPINE BESYLATE 2.5 MG/1
2.5 TABLET ORAL DAILY
Qty: 90 TABLET | Refills: 1 | Status: SHIPPED | OUTPATIENT
Start: 2024-03-13 | End: 2024-04-23

## 2024-03-13 RX ORDER — LISINOPRIL 40 MG/1
40 TABLET ORAL DAILY
Qty: 90 TABLET | Refills: 1 | Status: SHIPPED | OUTPATIENT
Start: 2024-03-13 | End: 2024-04-23

## 2024-03-14 PROBLEM — K50.10 CROHN'S DISEASE OF LARGE INTESTINE WITHOUT COMPLICATION (MULTI): Status: ACTIVE | Noted: 2024-03-14

## 2024-03-14 PROBLEM — M25.551 HIP PAIN, RIGHT: Status: RESOLVED | Noted: 2023-04-04 | Resolved: 2024-03-14

## 2024-03-14 ASSESSMENT — ENCOUNTER SYMPTOMS
CHEST TIGHTNESS: 0
HEMATURIA: 0
FREQUENCY: 0
TROUBLE SWALLOWING: 0
SLEEP DISTURBANCE: 0
FEVER: 0
WOUND: 0
SPEECH DIFFICULTY: 0
WEAKNESS: 0
SEIZURES: 0
NUMBNESS: 0
FATIGUE: 0
NECK PAIN: 0
STRIDOR: 0
VOMITING: 0
DECREASED CONCENTRATION: 0
HEADACHES: 0
PHOTOPHOBIA: 0
BLOOD IN STOOL: 0
HALLUCINATIONS: 0
TREMORS: 0
NAUSEA: 0
SORE THROAT: 0
JOINT SWELLING: 0
BACK PAIN: 0
DYSPHORIC MOOD: 0
SINUS PAIN: 0
APPETITE CHANGE: 0
POLYDIPSIA: 0
DYSURIA: 0
MYALGIAS: 0
PALPITATIONS: 0
SHORTNESS OF BREATH: 0
CONFUSION: 0
ABDOMINAL PAIN: 0
FACIAL ASYMMETRY: 0
UNEXPECTED WEIGHT CHANGE: 0
COUGH: 0
VOICE CHANGE: 0
EYE PAIN: 0
FLANK PAIN: 0
ADENOPATHY: 0
NERVOUS/ANXIOUS: 0
CONSTIPATION: 0
DIZZINESS: 0
DIARRHEA: 0
ACTIVITY CHANGE: 0
WHEEZING: 0
COLOR CHANGE: 0
POLYPHAGIA: 0
ARTHRALGIAS: 0

## 2024-04-05 ENCOUNTER — APPOINTMENT (OUTPATIENT)
Dept: INFECTIOUS DISEASES | Facility: CLINIC | Age: 79
End: 2024-04-05
Payer: MEDICARE

## 2024-04-09 ENCOUNTER — TELEMEDICINE (OUTPATIENT)
Dept: INFECTIOUS DISEASES | Facility: CLINIC | Age: 79
End: 2024-04-09
Payer: MEDICARE

## 2024-04-09 DIAGNOSIS — Z87.440 HISTORY OF RECURRENT UTIS: Primary | ICD-10-CM

## 2024-04-09 PROCEDURE — 99214 OFFICE O/P EST MOD 30 MIN: CPT | Performed by: INTERNAL MEDICINE

## 2024-04-09 PROCEDURE — 1159F MED LIST DOCD IN RCRD: CPT | Performed by: INTERNAL MEDICINE

## 2024-04-09 PROCEDURE — 1160F RVW MEDS BY RX/DR IN RCRD: CPT | Performed by: INTERNAL MEDICINE

## 2024-04-09 RX ORDER — SULFAMETHOXAZOLE AND TRIMETHOPRIM 800; 160 MG/1; MG/1
1 TABLET ORAL EVERY OTHER DAY
Qty: 50 TABLET | Refills: 3 | Status: SHIPPED | OUTPATIENT
Start: 2024-04-09 | End: 2024-04-09 | Stop reason: WASHOUT

## 2024-04-09 RX ORDER — SULFAMETHOXAZOLE AND TRIMETHOPRIM 400; 80 MG/1; MG/1
1 TABLET ORAL EVERY OTHER DAY
Qty: 50 TABLET | Refills: 3 | Status: SHIPPED | OUTPATIENT
Start: 2024-04-09 | End: 2024-07-08

## 2024-04-09 NOTE — PROGRESS NOTES
First visit in [facility].  We follow the patient for recurrent UTIs.  She is currently on topical estrogen and single strength Bactrim every other day- She has been doing good in this with no symptomatic UTIs.  Sounded well today.    We have told her in the past we do not always favor a strategy of chronic suppressive antibiotics in postmenopausal women because they get frequently colonized -but in her  this combination of low-dose single strength Bactrim and topical estrogen has it worked really well for her I will continue as long as it works.  We did refills and we will follow-up in October

## 2024-04-23 DIAGNOSIS — I10 PRIMARY HYPERTENSION: ICD-10-CM

## 2024-04-23 RX ORDER — AMLODIPINE BESYLATE 2.5 MG/1
2.5 TABLET ORAL DAILY
Qty: 100 TABLET | Refills: 2 | Status: SHIPPED | OUTPATIENT
Start: 2024-04-23

## 2024-04-23 RX ORDER — LISINOPRIL 40 MG/1
40 TABLET ORAL DAILY
Qty: 100 TABLET | Refills: 2 | Status: SHIPPED | OUTPATIENT
Start: 2024-04-23

## 2024-04-23 RX ORDER — HYDROXYZINE PAMOATE 50 MG/1
CAPSULE ORAL 4 TIMES DAILY PRN
COMMUNITY
Start: 2013-06-24

## 2024-04-25 ENCOUNTER — APPOINTMENT (OUTPATIENT)
Dept: OTOLARYNGOLOGY | Facility: CLINIC | Age: 79
End: 2024-04-25
Payer: MEDICARE

## 2024-06-06 ENCOUNTER — APPOINTMENT (OUTPATIENT)
Dept: DERMATOLOGY | Facility: CLINIC | Age: 79
End: 2024-06-06
Payer: MEDICARE

## 2024-06-25 ENCOUNTER — DOCUMENTATION (OUTPATIENT)
Dept: PHYSICAL THERAPY | Facility: CLINIC | Age: 79
End: 2024-06-25
Payer: MEDICARE

## 2024-06-25 NOTE — PROGRESS NOTES
Physical Therapy    Discharge Summary    Name: Tanya Fan  MRN: [id number]  : 1945  Date: 24    Discharge Summary: PT    Discharge Information:     Therapy Summary:     Discharge Status: discharge to [facility]     Rehab Discharge Reason: primary therapist no longer at this location, discharge

## 2024-07-09 ENCOUNTER — HOSPITAL ENCOUNTER (EMERGENCY)
Facility: HOSPITAL | Age: 79
Discharge: HOME | End: 2024-07-09
Payer: MEDICARE

## 2024-07-09 ENCOUNTER — APPOINTMENT (OUTPATIENT)
Dept: RADIOLOGY | Facility: HOSPITAL | Age: 79
End: 2024-07-09
Payer: MEDICARE

## 2024-07-09 VITALS
RESPIRATION RATE: 18 BRPM | BODY MASS INDEX: 29.02 KG/M2 | HEART RATE: 71 BPM | WEIGHT: 170 LBS | TEMPERATURE: 98 F | DIASTOLIC BLOOD PRESSURE: 64 MMHG | SYSTOLIC BLOOD PRESSURE: 134 MMHG | HEIGHT: 64 IN | OXYGEN SATURATION: 99 %

## 2024-07-09 DIAGNOSIS — M25.562 ACUTE PAIN OF LEFT KNEE: Primary | ICD-10-CM

## 2024-07-09 DIAGNOSIS — M17.12 ARTHRITIS OF LEFT KNEE: ICD-10-CM

## 2024-07-09 PROCEDURE — 2500000004 HC RX 250 GENERAL PHARMACY W/ HCPCS (ALT 636 FOR OP/ED): Performed by: SURGERY

## 2024-07-09 PROCEDURE — 2500000005 HC RX 250 GENERAL PHARMACY W/O HCPCS: Performed by: SURGERY

## 2024-07-09 PROCEDURE — 99283 EMERGENCY DEPT VISIT LOW MDM: CPT

## 2024-07-09 PROCEDURE — 73562 X-RAY EXAM OF KNEE 3: CPT | Mod: LEFT SIDE | Performed by: RADIOLOGY

## 2024-07-09 PROCEDURE — 73562 X-RAY EXAM OF KNEE 3: CPT | Mod: LT

## 2024-07-09 PROCEDURE — 96372 THER/PROPH/DIAG INJ SC/IM: CPT | Performed by: SURGERY

## 2024-07-09 RX ORDER — KETOROLAC TROMETHAMINE 30 MG/ML
30 INJECTION, SOLUTION INTRAMUSCULAR; INTRAVENOUS ONCE
Status: COMPLETED | OUTPATIENT
Start: 2024-07-09 | End: 2024-07-09

## 2024-07-09 RX ORDER — LIDOCAINE HYDROCHLORIDE 10 MG/ML
20 INJECTION INFILTRATION; PERINEURAL ONCE
Status: COMPLETED | OUTPATIENT
Start: 2024-07-09 | End: 2024-07-09

## 2024-07-09 ASSESSMENT — COLUMBIA-SUICIDE SEVERITY RATING SCALE - C-SSRS
1. IN THE PAST MONTH, HAVE YOU WISHED YOU WERE DEAD OR WISHED YOU COULD GO TO SLEEP AND NOT WAKE UP?: NO
6. HAVE YOU EVER DONE ANYTHING, STARTED TO DO ANYTHING, OR PREPARED TO DO ANYTHING TO END YOUR LIFE?: NO
2. HAVE YOU ACTUALLY HAD ANY THOUGHTS OF KILLING YOURSELF?: NO

## 2024-07-09 ASSESSMENT — PAIN DESCRIPTION - PROGRESSION: CLINICAL_PROGRESSION: NOT CHANGED

## 2024-07-09 ASSESSMENT — PAIN DESCRIPTION - PAIN TYPE: TYPE: ACUTE PAIN

## 2024-07-09 ASSESSMENT — PAIN DESCRIPTION - ONSET: ONSET: ONGOING

## 2024-07-09 ASSESSMENT — PAIN DESCRIPTION - FREQUENCY: FREQUENCY: CONSTANT/CONTINUOUS

## 2024-07-09 ASSESSMENT — PAIN SCALES - GENERAL: PAINLEVEL_OUTOF10: 10 - WORST POSSIBLE PAIN

## 2024-07-09 ASSESSMENT — PAIN - FUNCTIONAL ASSESSMENT: PAIN_FUNCTIONAL_ASSESSMENT: 0-10

## 2024-07-09 ASSESSMENT — PAIN DESCRIPTION - DESCRIPTORS: DESCRIPTORS: ACHING

## 2024-07-09 ASSESSMENT — PAIN DESCRIPTION - LOCATION: LOCATION: KNEE

## 2024-07-09 ASSESSMENT — PAIN DESCRIPTION - ORIENTATION: ORIENTATION: LEFT

## 2024-07-10 NOTE — ED TRIAGE NOTES
"Patient arrives via wheelchair with c/o worsening left knee pain. Pt states \"3 days ago I must've twisted it while I was sleeping. I woke up with pain. I've been fine until today, it seems to have gotten worse\". Pt with pain to the back of L knee, side of L knee. Pt unable to extend left leg due to pain. Denies any fall. No previous injury  "

## 2024-07-10 NOTE — ED PROVIDER NOTES
Chief Complaint   Patient presents with    Knee Pain     Left     HPI:   Tanya Fan is an 79 y.o. female with a history of HTN, diabetes presenting to the ED with chief complaint of left knee pain.  She explains that the pain started about 3 days ago.  She denies any specific injury.  Explains that she has had increased pain with standing and walking over the last 3 days it has been progressively worsening.  She explains that now her pain is worsened with bending and she reports pain in the back of the knee.  Denies pain in the hip or ankle.  Denies recent falls.  Denies numbness or tingling.    Allergies   Allergen Reactions    Amoxicillin-Pot Clavulanate Unknown    Doxycycline Unknown    Aspartame GI bleeding and Unknown     Bleeding in colon   :  Past Medical History:   Diagnosis Date    Acute pharyngitis, unspecified 2016    Pharyngitis, unspecified etiology    Acute pharyngitis, unspecified 2016    Sore throat    Acute upper respiratory infection, unspecified 2016    Acute upper respiratory infection    Contusion of unspecified knee, initial encounter 2015    Contusion of knee    Diabetes (Multi)     Dysuria 2015    Dysuria    [facility]      (spontaneous vaginal delivery)    Hypertension     Other disorders of nervous system 2014    Neurologic disorder    Personal history of other diseases of the circulatory system     History of hypertension    Personal history of other diseases of the respiratory system 2016    History of acute sinusitis    Personal history of pneumonia (recurrent) 2020    History of pneumonia    Personal history of urinary (tract) infections 2015    History of urinary tract infection    Recurrent UTI 2023    Seizure (Multi)     Unspecified conjunctivitis 2016    Conjunctivitis, left eye    Varicella without complication     Varicella without complication     Past Surgical  History:   Procedure Laterality Date    BLADDER SURGERY  11/14/2013    Bladder Surgery    FOOT SURGERY  11/14/2013    Foot Surgery    HAND SURGERY  12/13/2013    Hand Surgery                                                                                                                                                          HYSTERECTOMY  11/14/2013    Hysterectomy    MR HEAD ANGIO WO IV CONTRAST  6/27/2019    MR HEAD ANGIO WO IV CONTRAST 6/27/2019 [facility]    MR NECK ANGIO WO IV CONTRAST  6/27/2019    MR NECK ANGIO WO IV CONTRAST 6/27/2019 [facility]    OTHER SURGICAL HISTORY  11/14/2013    Brain Surgery    OTHER SURGICAL HISTORY  07/25/2019    Arm surgery    OTHER SURGICAL HISTORY  07/25/2019    Oophorectomy    OTHER SURGICAL HISTORY  07/25/2019    Ovarian cystectomy     Family History   Problem Relation Name Age of Onset    Stroke Father      Diabetes Other Family     Hypertension Other Family     Hearing loss Other Family     Heart disease Other Family         Physical Exam  Vitals and nursing note reviewed.   Constitutional:       General: She is not in acute distress.     Appearance: She is well-developed.   HENT:      Head: Normocephalic and atraumatic.   Eyes:      Conjunctiva/sclera: Conjunctivae normal.   Cardiovascular:      Rate and Rhythm: Normal rate and regular rhythm.      Heart sounds: No murmur heard.  Pulmonary:      Effort: Pulmonary effort is normal. No respiratory distress.      Breath sounds: Normal breath sounds.   Abdominal:      Palpations: Abdomen is soft.      Tenderness: There is no abdominal tenderness.   Musculoskeletal:         General: No swelling.      Cervical back: Neck supple.      Comments: Exam of the left knee shows full extension no extension lag.  She has pain with flexion of the knee she can bend to about 90 with pain.  She is stable to AP varus valgus stresses.  No effusion.  Crepitance with motion.  No pain with motion of the hip.  Neurovascular  intact.  Good distal pulses.   Skin:     General: Skin is warm and dry.      Capillary Refill: Capillary refill takes less than 2 seconds.      Comments: Multiple varicosities noted over the distal legs   Neurological:      General: No focal deficit present.      Mental Status: She is alert and oriented to person, place, and time.      Sensory: No sensory deficit.   Psychiatric:         Mood and Affect: Mood normal.        VS: As documented in the triage note and EMR flowsheet from this visit were reviewed.      Medical Decision Making: This is a 79-year-old female with a history of HTN and diabetes presenting to the ED with chief complaint of atraumatic left knee pain.  Patient explains her pain started about 3 days ago she has no memory of injury.  She denies recent falls.  She explains that the pain has been progressively worsening and reports pain worse with bending squatting and standing at this point.  She denies numbness or tingling.  No swelling.  On exam her vitals are stable she is afebrile in no acute distress.  The left knee is not significantly red hot swollen or tender.  She stable to AP varus and valgus stresses.  She is tender over the lateral joint line.  She has significant pain through range of motion with crepitance.  X-rays of the left knee shows significant arthritis of the knee.  Considered starting on steroids however she is diabetic.  Considered anti-inflammatories however she does have a history of HTN as well.  Limited options for treatment so I did inject the knee with Toradol and lidocaine for some pain relief today.  Recommended follow-up outpatient with orthopedic.  She is agreeable to the plan.  Differential diagnosis includes sprain, strain, fracture, dislocation, arthritis, meniscal injury, ligamentous rupture, tendon rupture, septic arthritis, gout      Counseling: Spoke with the patient and discussed today´s findings, in addition to providing specific details for the plan of care  and expected course.  Patient was given the opportunity to ask questions.    Discussed return precautions and importance of follow-up.  Advised to follow-up with Orthopedics.  I specifically advised to return to the ED for changing or worsening symptoms, new symptoms, complaint specific precautions, and precautions listed on the discharge paperwork.  Educated on the common potential side effects of medications prescribed.    I advised the patient that the emergency evaluation and treatment provided today doesn't end their need for medical care. It is very important that they follow-up with their primary care provider or other specialist as instructed.    The plan of care was mutually agreed upon with the patient. The patient and/or family were given the opportunity to ask questions. All questions asked today in the ED were answered to the best of my ability with today's information.    This patient was cared for in the setting of nationwide stress on resources and staffing.    This report was transcribed using voice recognition software.  Every effort was made to ensure accuracy, however, inadvertently computerized transcription errors may be present.       Jovany Lozano PA-C  07/09/24 [id number]

## 2024-07-18 ENCOUNTER — OFFICE VISIT (OUTPATIENT)
Dept: ORTHOPEDIC SURGERY | Facility: CLINIC | Age: 79
End: 2024-07-18
Payer: MEDICARE

## 2024-07-18 VITALS — HEIGHT: 64 IN | WEIGHT: 170 LBS | BODY MASS INDEX: 29.02 KG/M2

## 2024-07-18 DIAGNOSIS — M17.0 OSTEOARTHRITIS OF BOTH KNEES, UNSPECIFIED OSTEOARTHRITIS TYPE: ICD-10-CM

## 2024-07-18 DIAGNOSIS — G89.29 CHRONIC PAIN OF LEFT KNEE: Primary | ICD-10-CM

## 2024-07-18 DIAGNOSIS — M25.562 CHRONIC PAIN OF LEFT KNEE: Primary | ICD-10-CM

## 2024-07-18 PROCEDURE — 20610 DRAIN/INJ JOINT/BURSA W/O US: CPT | Mod: LT | Performed by: ORTHOPAEDIC SURGERY

## 2024-07-18 PROCEDURE — 2500000004 HC RX 250 GENERAL PHARMACY W/ HCPCS (ALT 636 FOR OP/ED): Performed by: ORTHOPAEDIC SURGERY

## 2024-07-18 PROCEDURE — 99214 OFFICE O/P EST MOD 30 MIN: CPT | Performed by: ORTHOPAEDIC SURGERY

## 2024-07-18 PROCEDURE — 1160F RVW MEDS BY RX/DR IN RCRD: CPT | Performed by: ORTHOPAEDIC SURGERY

## 2024-07-18 PROCEDURE — 1125F AMNT PAIN NOTED PAIN PRSNT: CPT | Performed by: ORTHOPAEDIC SURGERY

## 2024-07-18 PROCEDURE — 2500000005 HC RX 250 GENERAL PHARMACY W/O HCPCS: Performed by: ORTHOPAEDIC SURGERY

## 2024-07-18 PROCEDURE — 1036F TOBACCO NON-USER: CPT | Performed by: ORTHOPAEDIC SURGERY

## 2024-07-18 PROCEDURE — 99204 OFFICE O/P NEW MOD 45 MIN: CPT | Performed by: ORTHOPAEDIC SURGERY

## 2024-07-18 PROCEDURE — 1159F MED LIST DOCD IN RCRD: CPT | Performed by: ORTHOPAEDIC SURGERY

## 2024-07-18 RX ORDER — LIDOCAINE HYDROCHLORIDE 20 MG/ML
2 INJECTION, SOLUTION INFILTRATION; PERINEURAL
Status: COMPLETED | OUTPATIENT
Start: 2024-07-18 | End: 2024-07-18

## 2024-07-18 RX ORDER — METHYLPREDNISOLONE ACETATE 40 MG/ML
40 INJECTION, SUSPENSION INTRA-ARTICULAR; INTRALESIONAL; INTRAMUSCULAR; SOFT TISSUE
Status: COMPLETED | OUTPATIENT
Start: 2024-07-18 | End: 2024-07-18

## 2024-07-18 ASSESSMENT — PAIN SCALES - GENERAL: PAINLEVEL_OUTOF10: 2

## 2024-07-18 ASSESSMENT — PAIN - FUNCTIONAL ASSESSMENT: PAIN_FUNCTIONAL_ASSESSMENT: 0-10

## 2024-07-18 ASSESSMENT — PAIN DESCRIPTION - DESCRIPTORS: DESCRIPTORS: ACHING

## 2024-07-18 NOTE — PROGRESS NOTES
Chief complaint is left knee pain and swelling has had some chronic intermittent knee pain that has not really been treated she had an exacerbation of the knee pain and went to the emergency room where she was told she had arthritis   and some type of injection  Knee is still sore and swollen  Past medical,family and social histories have been reviewed and are up to date.  All other body systems have been reviewed and are negative for complaint.  Constitutional: Well-developed well-nourished   Eyes: Sclerae anicteric, pupils equal and round  HENT: Normocephalic atraumatic  Cardiovascular: Pulses full, regular rate and rhythm  Respiratory: Breathing not labored, no wheezing  Integumentary: Skin intact, no lesions or rashes  Neurological: Sensation intact, no gross strength deficits, reflexes equal  Psychiatric: Alert oriented and appropriate  Hematologic/lymphatic: No lymphadenopathy  Left knee: No angular deformity small effusion generalized tenderness no popliteal cyst full range of motion  X-rays reviewed tricompartment arthritis assessment knee arthritis discussed natural history discussed indications knee replacement surgery injected knee today  L Inj/Asp: L knee on 7/18/2024 8:56 AM  Indications: pain and joint swelling  Details: 21 G needle, anterolateral approach  Medications: 40 mg methylPREDNISolone acetate 40 mg/mL; 2 mL lidocaine 20 mg/mL (2 %)  Outcome: tolerated well, no immediate complications  Procedure, treatment alternatives, risks and benefits explained, specific risks discussed. Consent was given by the patient. Immediately prior to procedure a time out was called to verify the correct patient, procedure, equipment, support staff and site/side marked as required. Patient was prepped and draped in the usual sterile fashion.         Physical therapy referral

## 2024-08-04 DIAGNOSIS — E11.9 TYPE 2 DIABETES MELLITUS WITHOUT COMPLICATION, WITHOUT LONG-TERM CURRENT USE OF INSULIN (MULTI): ICD-10-CM

## 2024-08-05 RX ORDER — METFORMIN HYDROCHLORIDE 500 MG/1
1000 TABLET, EXTENDED RELEASE ORAL 2 TIMES DAILY
Qty: 400 TABLET | Refills: 2 | Status: SHIPPED | OUTPATIENT
Start: 2024-08-05

## 2024-08-13 DIAGNOSIS — N30.00 ACUTE CYSTITIS WITHOUT HEMATURIA: ICD-10-CM

## 2024-09-02 DIAGNOSIS — I10 PRIMARY HYPERTENSION: ICD-10-CM

## 2024-09-03 DIAGNOSIS — I10 PRIMARY HYPERTENSION: ICD-10-CM

## 2024-09-03 DIAGNOSIS — E11.9 TYPE 2 DIABETES MELLITUS WITHOUT COMPLICATION, UNSPECIFIED WHETHER LONG TERM INSULIN USE (MULTI): ICD-10-CM

## 2024-09-03 DIAGNOSIS — E78.2 HYPERLIPIDEMIA, MIXED: ICD-10-CM

## 2024-09-03 DIAGNOSIS — E55.9 VITAMIN D DEFICIENCY: ICD-10-CM

## 2024-09-03 RX ORDER — BISOPROLOL FUMARATE AND HYDROCHLOROTHIAZIDE 10; 6.25 MG/1; MG/1
TABLET ORAL
Qty: 150 TABLET | Refills: 3 | Status: SHIPPED | OUTPATIENT
Start: 2024-09-03

## 2024-09-06 ENCOUNTER — LAB (OUTPATIENT)
Dept: LAB | Facility: LAB | Age: 79
End: 2024-09-06
Payer: MEDICARE

## 2024-09-06 DIAGNOSIS — E55.9 VITAMIN D DEFICIENCY: ICD-10-CM

## 2024-09-06 DIAGNOSIS — I10 PRIMARY HYPERTENSION: ICD-10-CM

## 2024-09-06 DIAGNOSIS — E11.9 TYPE 2 DIABETES MELLITUS WITHOUT COMPLICATION, UNSPECIFIED WHETHER LONG TERM INSULIN USE (MULTI): ICD-10-CM

## 2024-09-06 DIAGNOSIS — E78.2 HYPERLIPIDEMIA, MIXED: ICD-10-CM

## 2024-09-06 LAB
25(OH)D3 SERPL-MCNC: 43 NG/ML (ref 30–100)
ALBUMIN SERPL BCP-MCNC: 4.1 G/DL (ref 3.4–5)
ALP SERPL-CCNC: 60 U/L (ref 33–136)
ALT SERPL W P-5'-P-CCNC: 15 U/L (ref 7–45)
ANION GAP SERPL CALC-SCNC: 14 MMOL/L (ref 10–20)
AST SERPL W P-5'-P-CCNC: 16 U/L (ref 9–39)
BASOPHILS # BLD AUTO: 0.08 X10*3/UL (ref 0–0.1)
BASOPHILS NFR BLD AUTO: 1.3 %
BILIRUB SERPL-MCNC: 0.4 MG/DL (ref 0–1.2)
BUN SERPL-MCNC: 23 MG/DL (ref 6–23)
CALCIUM SERPL-MCNC: 9.8 MG/DL (ref 8.6–10.6)
CHLORIDE SERPL-SCNC: 106 MMOL/L (ref 98–107)
CHOLEST SERPL-MCNC: 140 MG/DL (ref 0–199)
CHOLESTEROL/HDL RATIO: 2.8
CO2 SERPL-SCNC: 26 MMOL/L (ref 21–32)
CREAT SERPL-MCNC: 1.05 MG/DL (ref 0.5–1.05)
EGFRCR SERPLBLD CKD-EPI 2021: 55 ML/MIN/1.73M*2
EOSINOPHIL # BLD AUTO: 0.21 X10*3/UL (ref 0–0.4)
EOSINOPHIL NFR BLD AUTO: 3.3 %
ERYTHROCYTE [DISTWIDTH] IN BLOOD BY AUTOMATED COUNT: 13.1 % (ref 11.5–14.5)
EST. AVERAGE GLUCOSE BLD GHB EST-MCNC: 206 MG/DL
GLUCOSE SERPL-MCNC: 197 MG/DL (ref 74–99)
HBA1C MFR BLD: 8.8 %
HCT VFR BLD AUTO: 37 % (ref 36–46)
HDLC SERPL-MCNC: 49.4 MG/DL
HGB BLD-MCNC: 12 G/DL (ref 12–16)
IMM GRANULOCYTES # BLD AUTO: 0.01 X10*3/UL (ref 0–0.5)
IMM GRANULOCYTES NFR BLD AUTO: 0.2 % (ref 0–0.9)
LDLC SERPL CALC-MCNC: 47 MG/DL
LYMPHOCYTES # BLD AUTO: 2.24 X10*3/UL (ref 0.8–3)
LYMPHOCYTES NFR BLD AUTO: 35 %
MCH RBC QN AUTO: 29 PG (ref 26–34)
MCHC RBC AUTO-ENTMCNC: 32.4 G/DL (ref 32–36)
MCV RBC AUTO: 89 FL (ref 80–100)
MONOCYTES # BLD AUTO: 0.52 X10*3/UL (ref 0.05–0.8)
MONOCYTES NFR BLD AUTO: 8.1 %
NEUTROPHILS # BLD AUTO: 3.34 X10*3/UL (ref 1.6–5.5)
NEUTROPHILS NFR BLD AUTO: 52.1 %
NON HDL CHOLESTEROL: 91 MG/DL (ref 0–149)
NRBC BLD-RTO: 0 /100 WBCS (ref 0–0)
PLATELET # BLD AUTO: 254 X10*3/UL (ref 150–450)
POTASSIUM SERPL-SCNC: 4.4 MMOL/L (ref 3.5–5.3)
PROT SERPL-MCNC: 6.4 G/DL (ref 6.4–8.2)
RBC # BLD AUTO: 4.14 X10*6/UL (ref 4–5.2)
SODIUM SERPL-SCNC: 142 MMOL/L (ref 136–145)
TRIGL SERPL-MCNC: 220 MG/DL (ref 0–149)
VLDL: 44 MG/DL (ref 0–40)
WBC # BLD AUTO: 6.4 X10*3/UL (ref 4.4–11.3)

## 2024-09-06 PROCEDURE — 80053 COMPREHEN METABOLIC PANEL: CPT

## 2024-09-06 PROCEDURE — 80061 LIPID PANEL: CPT

## 2024-09-06 PROCEDURE — 85025 COMPLETE CBC W/AUTO DIFF WBC: CPT

## 2024-09-06 PROCEDURE — 36415 COLL VENOUS BLD VENIPUNCTURE: CPT

## 2024-09-06 PROCEDURE — 82306 VITAMIN D 25 HYDROXY: CPT

## 2024-09-06 PROCEDURE — 83036 HEMOGLOBIN GLYCOSYLATED A1C: CPT

## 2024-09-10 ENCOUNTER — APPOINTMENT (OUTPATIENT)
Dept: PRIMARY CARE | Facility: CLINIC | Age: 79
End: 2024-09-10
Payer: MEDICARE

## 2024-09-10 VITALS
BODY MASS INDEX: 27.83 KG/M2 | WEIGHT: 163 LBS | HEIGHT: 64 IN | DIASTOLIC BLOOD PRESSURE: 64 MMHG | TEMPERATURE: 97.4 F | HEART RATE: 68 BPM | SYSTOLIC BLOOD PRESSURE: 118 MMHG | OXYGEN SATURATION: 96 %

## 2024-09-10 DIAGNOSIS — E55.9 VITAMIN D DEFICIENCY: ICD-10-CM

## 2024-09-10 DIAGNOSIS — Z00.00 ROUTINE GENERAL MEDICAL EXAMINATION AT HEALTH CARE FACILITY: Primary | ICD-10-CM

## 2024-09-10 DIAGNOSIS — N18.31 STAGE 3A CHRONIC KIDNEY DISEASE (MULTI): ICD-10-CM

## 2024-09-10 DIAGNOSIS — I10 PRIMARY HYPERTENSION: ICD-10-CM

## 2024-09-10 DIAGNOSIS — E78.2 HYPERLIPIDEMIA, MIXED: ICD-10-CM

## 2024-09-10 DIAGNOSIS — E11.9 TYPE 2 DIABETES MELLITUS WITHOUT COMPLICATION, WITHOUT LONG-TERM CURRENT USE OF INSULIN (MULTI): ICD-10-CM

## 2024-09-10 DIAGNOSIS — Z12.31 BREAST CANCER SCREENING BY MAMMOGRAM: ICD-10-CM

## 2024-09-10 PROCEDURE — 1170F FXNL STATUS ASSESSED: CPT | Performed by: INTERNAL MEDICINE

## 2024-09-10 PROCEDURE — 1160F RVW MEDS BY RX/DR IN RCRD: CPT | Performed by: INTERNAL MEDICINE

## 2024-09-10 PROCEDURE — 3074F SYST BP LT 130 MM HG: CPT | Performed by: INTERNAL MEDICINE

## 2024-09-10 PROCEDURE — 1159F MED LIST DOCD IN RCRD: CPT | Performed by: INTERNAL MEDICINE

## 2024-09-10 PROCEDURE — 1123F ACP DISCUSS/DSCN MKR DOCD: CPT | Performed by: INTERNAL MEDICINE

## 2024-09-10 PROCEDURE — 99214 OFFICE O/P EST MOD 30 MIN: CPT | Performed by: INTERNAL MEDICINE

## 2024-09-10 PROCEDURE — 1158F ADVNC CARE PLAN TLK DOCD: CPT | Performed by: INTERNAL MEDICINE

## 2024-09-10 PROCEDURE — G0439 PPPS, SUBSEQ VISIT: HCPCS | Performed by: INTERNAL MEDICINE

## 2024-09-10 PROCEDURE — 3078F DIAST BP <80 MM HG: CPT | Performed by: INTERNAL MEDICINE

## 2024-09-10 PROCEDURE — 1036F TOBACCO NON-USER: CPT | Performed by: INTERNAL MEDICINE

## 2024-09-10 ASSESSMENT — ACTIVITIES OF DAILY LIVING (ADL)
MANAGING FINANCES: INDEPENDENT
PREPARING MEALS: INDEPENDENT
STIL DRIVING: YES
FEEDING: INDEPENDENT
TOILETING: INDEPENDENT
USING TRANSPORTATION: INDEPENDENT
EATING: INDEPENDENT
USING TELEPHONE: INDEPENDENT
BATHING: INDEPENDENT
TAKING MEDICATION: INDEPENDENT
GROCERY SHOPPING: INDEPENDENT
NEEDS ASSISTANCE WITH FOOD: INDEPENDENT
ADEQUATE_TO_COMPLETE_ADL: YES
DOING HOUSEWORK: INDEPENDENT
DRESSING: INDEPENDENT
JUDGMENT_ADEQUATE_SAFELY_COMPLETE_DAILY_ACTIVITIES: YES

## 2024-09-10 ASSESSMENT — ENCOUNTER SYMPTOMS
ARTHRALGIAS: 0
SORE THROAT: 0
HEMATURIA: 0
HALLUCINATIONS: 0
NUMBNESS: 0
DYSURIA: 0
WHEEZING: 0
VOMITING: 0
ACTIVITY CHANGE: 0
PALPITATIONS: 0
FATIGUE: 0
BLOOD IN STOOL: 0
FREQUENCY: 0
POLYDIPSIA: 0
STRIDOR: 0
TREMORS: 0
CONFUSION: 0
COLOR CHANGE: 0
MYALGIAS: 0
FEVER: 0
WEAKNESS: 0
CONSTIPATION: 0
WOUND: 0
SHORTNESS OF BREATH: 0
TROUBLE SWALLOWING: 0
NERVOUS/ANXIOUS: 0
FLANK PAIN: 0
DIARRHEA: 0
VOICE CHANGE: 0
APPETITE CHANGE: 0
BACK PAIN: 0
NAUSEA: 0
COUGH: 0
EYE PAIN: 0
JOINT SWELLING: 0
ADENOPATHY: 0
PHOTOPHOBIA: 0
SINUS PAIN: 0
HEADACHES: 0
DIZZINESS: 0
CHEST TIGHTNESS: 0
ABDOMINAL PAIN: 0
SEIZURES: 0
UNEXPECTED WEIGHT CHANGE: 0
NECK PAIN: 0
FACIAL ASYMMETRY: 0
SPEECH DIFFICULTY: 0
SLEEP DISTURBANCE: 0
POLYPHAGIA: 0

## 2024-09-10 ASSESSMENT — ANXIETY QUESTIONNAIRES
1. FEELING NERVOUS, ANXIOUS, OR ON EDGE: NOT AT ALL
5. BEING SO RESTLESS THAT IT IS HARD TO SIT STILL: NOT AT ALL
2. NOT BEING ABLE TO STOP OR CONTROL WORRYING: NOT AT ALL
7. FEELING AFRAID AS IF SOMETHING AWFUL MIGHT HAPPEN: NOT AT ALL
GAD7 TOTAL SCORE: 0
4. TROUBLE RELAXING: NOT AT ALL
6. BECOMING EASILY ANNOYED OR IRRITABLE: NOT AT ALL
3. WORRYING TOO MUCH ABOUT DIFFERENT THINGS: NOT AT ALL

## 2024-09-10 NOTE — PROGRESS NOTES
Subjective   Reason for Visit: Tanya Fan is an 79 y.o. female here for a Medicare Wellness visit and regular follow up.    Past Medical, Surgical, and Family History reviewed and updated in chart.    Reviewed all medications by prescribing practitioner or clinical pharmacist (such as prescriptions, OTCs, herbal therapies and supplements) and documented in the medical record.    HPI  Patient recently had labs which has bene reviewed and discussed with her    Type 2 DM without complication- Hemoglobin A1c has increased to 8.8 from 7.5 before. Patient is on Metformin and Jardiance. She said her life lately was under stress due to several things so eating was not good but now she is back on track and will try to do better. I also discussed her declining GFR which I think its temporary and will improve once A1c will improve. She also has not seen an eye doctor for diabetic retinopathy screening and will going to need ophthalmology.    Primary HTN- BP is good with current antihypertensive medications. Today its 118/64.    Vitamin D level is good with Vitamin D supplement.    Patient Care Team:  Marquise Garcia MD as PCP - General  Marquise Garcia MD as PCP - [employer] PCP     Review of Systems   Constitutional:  Negative for activity change, appetite change, fatigue, fever and unexpected weight change.   HENT:  Negative for dental problem, ear discharge, hearing loss, nosebleeds, postnasal drip, sinus pain, sore throat, trouble swallowing and voice change.    Eyes:  Negative for photophobia, pain and visual disturbance.   Respiratory:  Negative for cough, chest tightness, shortness of breath, wheezing and stridor.    Cardiovascular:  Negative for chest pain, palpitations and leg swelling.   Gastrointestinal:  Negative for abdominal pain, blood in stool, constipation, diarrhea, nausea and vomiting.   Endocrine: Negative for polydipsia, polyphagia and polyuria.   Genitourinary:  Negative for decreased urine  "volume, dyspareunia, dysuria, flank pain, frequency, hematuria, pelvic pain and urgency.   Musculoskeletal:  Negative for arthralgias, back pain, gait problem, joint swelling, myalgias and neck pain.   Skin:  Negative for color change, rash and wound.   Allergic/Immunologic: Negative for environmental allergies and food allergies.   Neurological:  Negative for dizziness, tremors, seizures, syncope, facial asymmetry, speech difficulty, weakness, numbness and headaches.   Hematological:  Negative for adenopathy.   Psychiatric/Behavioral:  Negative for behavioral problems, confusion, hallucinations, self-injury, sleep disturbance and suicidal ideas. The patient is not nervous/anxious.      Objective   Vitals:  /64   Pulse 68   Temp 36.3 °C (97.4 °F)   Ht 1.626 m (5' 4\")   Wt 73.9 kg (163 lb)   SpO2 96%   BMI 27.98 kg/m²       Physical Exam  Vitals and nursing note reviewed.   Constitutional:       General: She is not in acute distress.     Appearance: Normal appearance. She is not ill-appearing or toxic-appearing.   HENT:      Head: Normocephalic and atraumatic.      Nose: Nose normal.   Eyes:      General:         Right eye: No discharge.         Left eye: No discharge.      Extraocular Movements: Extraocular movements intact.      Conjunctiva/sclera: Conjunctivae normal.      Pupils: Pupils are equal, round, and reactive to light.   Cardiovascular:      Rate and Rhythm: Normal rate and regular rhythm.      Pulses: Normal pulses.      Heart sounds: Murmur heard.      No gallop.   Pulmonary:      Effort: Pulmonary effort is normal. No respiratory distress.      Breath sounds: Normal breath sounds. No stridor. No wheezing, rhonchi or rales.   Abdominal:      General: Bowel sounds are normal.      Tenderness: There is no abdominal tenderness. There is no right CVA tenderness or left CVA tenderness.   Musculoskeletal:         General: No swelling or deformity. Normal range of motion.      Cervical back: Normal " range of motion and neck supple. No rigidity or tenderness.      Right lower leg: No edema.      Left lower leg: No edema.   Skin:     General: Skin is warm.      Coloration: Skin is not jaundiced.      Findings: No bruising, erythema or rash.   Neurological:      General: No focal deficit present.      Mental Status: She is alert and oriented to person, place, and time.      Cranial Nerves: No cranial nerve deficit.      Gait: Gait normal.   Psychiatric:         Mood and Affect: Mood normal.         Behavior: Behavior normal.         Thought Content: Thought content normal.         Judgment: Judgment normal.       Assessment & Plan  Routine general medical examination at health care facility         Type 2 diabetes mellitus without complication, without long-term current use of insulin (Multi)  Lifestyle modification, especially dietary modification and check Hemoglobin A1c again in 4 months.  Continue Metformin and jardiance for now.    Orders:    Hemoglobin A1C; Future    Referral to Ophthalmology; Future    Stage 3a chronic kidney disease (Multi)    Orders:    Renal function panel; Future    Primary hypertension  Continue current anti hypertensive medication.         Hyperlipidemia, mixed  Continue rosuvastatin and your current lipid panel showed normal LDL.         Vitamin D deficiency  Continue oral Vitamin D supplement.         Breast cancer screening by mammogram    Orders:    BI mammo bilateral screening tomosynthesis; Future      RTC in 4 months.

## 2024-09-10 NOTE — ASSESSMENT & PLAN NOTE
Lifestyle modification, especially dietary modification and check Hemoglobin A1c again in 4 months.  Continue Metformin and jardiance for now.    Orders:    Hemoglobin A1C; Future    Referral to Ophthalmology; Future

## 2024-09-11 ENCOUNTER — EVALUATION (OUTPATIENT)
Dept: PHYSICAL THERAPY | Facility: CLINIC | Age: 79
End: 2024-09-11
Payer: MEDICARE

## 2024-09-11 DIAGNOSIS — M17.0 OSTEOARTHRITIS OF BOTH KNEES, UNSPECIFIED OSTEOARTHRITIS TYPE: ICD-10-CM

## 2024-09-11 DIAGNOSIS — M25.562 ACUTE PAIN OF LEFT KNEE: Primary | ICD-10-CM

## 2024-09-11 PROCEDURE — 97110 THERAPEUTIC EXERCISES: CPT | Mod: GP | Performed by: PHYSICAL THERAPIST

## 2024-09-11 PROCEDURE — 97161 PT EVAL LOW COMPLEX 20 MIN: CPT | Mod: GP | Performed by: PHYSICAL THERAPIST

## 2024-09-11 ASSESSMENT — ENCOUNTER SYMPTOMS
DEPRESSION: 0
LOSS OF SENSATION IN FEET: 0
OCCASIONAL FEELINGS OF UNSTEADINESS: 0

## 2024-09-11 NOTE — PROGRESS NOTES
Physical Therapy    Physical Therapy Evaluation and Treatment      Patient Name: Tanya Fan  MRN: [id number]  Today's Date: 9/11/2024  Time Entry:   Time Calculation  Start Time: 0900  Stop Time: 0945  Time Calculation (min): 45 min  PT Evaluation Time Entry  PT Evaluation (Low) Time Entry: 30  PT Therapeutic Procedures Time Entry  Therapeutic Exercise Time Entry: 15  Insurance: [employer] Medicare  Needs authorization  Visit #1    Assessment:  Tanya is a 79 y.o. female presenting to the clinic with acute onset L knee pain and swelling about 2 months ago without incident. Pain has improved since injection/aspiration several weeks after onset. Exam shows pain with end-range knee flexion AROM and decreased strength in knee and hip musculature,. Knee is non-tender to palpation and she ambulates with normal gait. She does not have any specific functional limitations, however has been more careful negotiating stairs since onset of knee pain. Skilled PT is medically necessary to address the above impairments to reduce knee pain and improve confidence in ADLs.    Plan:  OP PT Plan  Treatment/Interventions: Cryotherapy, Education/ Instruction, Hot pack, Manual therapy, Therapeutic exercises  PT Plan: Skilled PT  PT Frequency:  (1x every other week)  Duration: 4-6 visits  Onset Date: 07/06/24  Certification Period Start Date: 09/11/24  Certification Period End Date: 12/10/24  Number of Treatments Authorized: Needs auth  Rehab Potential: Good  Plan of Care Agreement: Patient      Problem List Items Addressed This Visit             ICD-10-CM    Osteoarthritis of both knees M17.0    Relevant Orders    Follow Up In Physical Therapy    Acute pain of left knee - Primary M25.562    Relevant Orders    Follow Up In Physical Therapy     General:  Reason for Referral: Left knee pain  Referred By: Dr. Allan Soto    Subjective    Tanya presents 2 months after experiencing acute onset L knee pain and swelling at night while sleeping. She  went to the ED a few days later and had x-rays which showed arthritic changes. She follow up with ortho and had injection/aspiration which helped her pain. She continues to have intermittent pain and feelings of giving out. She denies any clicking, catching or locking. She is taking Advil and icing occasionally. She does not feel limited but is careful with stair negotiation. PMH: HTN, diabetes, OA    Pt Goals: “Strengthen my knee and hopefully avoid whatever happened before.”    Precautions:  Precautions  STEADI Fall Risk Score (The score of 4 or more indicates an increased risk of falling): 0    Pain:  L lateral knee 0/10 currently, 3-4/10 at worst, dull ache in nature    Home Living:  Lives in 2 story home- 1 flight to bed/bath    Prior Level of Function:  Independent in all activities.    Objective   Palpation: (-) TTP to L knee structures    Gait: non-antalgic    Single leg balance: R- 5 seconds; L- 10 seconds    Hip AROM symmetrical and WNL      Knee AROM (R/L in degrees): 3-110; 3-112 *end-range pain    MMT (R/L):   Iliopsoas- 4/5; 4-/5  Hip ER- 4+/5; 4+/5  Hip Abd- 3+/5; 3+/5  Glute max- 4/5; 4/5  Quadriceps- 5/5; 4+/5  Hamstrings- 4/5; 4/5    Length tests (R/L in degrees):  90/90 hamstrings- (22) / (25)    Outcome Measures:  LEFS: 38/80    Treatments:  Therapeutic Exercise:   QS w/ towel roll x10 w/ 5 sec hold  SLR x10  SAQ x10 w/ 5 sec hold  LSLR x10  Bridges x10    EDUCATION:  Access Code: [email or web address]  URL: [email or web address]  Date: 2024  Prepared by: Shaquille Simmons    Exercises  - Supine Quad Set  - 1 x daily - 7 x weekly - 1-2 sets - 10 reps - 5 seconds hold  - Active Straight Leg Raise with Quad Set  - 1 x daily - 7 x weekly - 1-2 sets - 10 reps - 1-2 seconds hold  - Supine Knee Extension Strengthening  - 1 x daily - 7 x weekly - 1-2 sets - 10 reps - 5 seconds hold  - Sidelying Hip Abduction  - 1 x daily - 7 x weekly - 1-2 sets - 10 reps - 1-2 seconds hold  - Bridge  - 1 x  daily - 7 x weekly - 1-2 sets - 10 reps - 2-3 hold    Goals:  Active       PT Problem       PT Goal 1       Start:  09/11/24    Expected End:  11/10/24       Pt will demonstrate pain-free L knee AROM for optimal performance of ADLs.         PT Goal 2       Start:  09/11/24    Expected End:  11/10/24       Increase strength in B hip and knee MMT by at least 1/3 grade to improve dynamic knee joint support with standing, walking and negotiating stairs.         PT Goal 3       Start:  09/11/24    Expected End:  11/10/24       Pt will report 0/10 L knee pain to improve all ADLs.         PT Goal 4       Start:  09/11/24    Expected End:  11/10/24       Improve LEFS by at least 9 points (MDIC).         PT Goal 5       Start:  09/11/24    Expected End:  11/10/24       Pt will demonstrate independence with HEP for proper self-management at home.

## 2024-09-25 ENCOUNTER — TREATMENT (OUTPATIENT)
Dept: PHYSICAL THERAPY | Facility: CLINIC | Age: 79
End: 2024-09-25
Payer: MEDICARE

## 2024-09-25 DIAGNOSIS — M25.562 ACUTE PAIN OF LEFT KNEE: ICD-10-CM

## 2024-09-25 DIAGNOSIS — M17.0 OSTEOARTHRITIS OF BOTH KNEES, UNSPECIFIED OSTEOARTHRITIS TYPE: ICD-10-CM

## 2024-09-25 PROCEDURE — 97110 THERAPEUTIC EXERCISES: CPT | Mod: GP,CQ

## 2024-09-25 NOTE — PROGRESS NOTES
"Physical Therapy    Physical Therapy Evaluation and Treatment      Patient Name: Tanya Fan  MRN: [id number]  Today's Date: 2024  Time Entry:   Time Calculation  Start Time: 1015  Stop Time: 1100  Time Calculation (min): 45 min     PT Therapeutic Procedures Time Entry  Therapeutic Exercise Time Entry: 40  Insurance: [facility] Medicare  Needs authorization  Visit #2    Assessment:  Pt was able to progress ex's and did well w/ machines. Showing good ecc. control throughout.    Plan:   Strengthen as needed.      Problem List Items Addressed This Visit             ICD-10-CM    Osteoarthritis of both knees M17.0    Acute pain of left knee M25.562     General:  Reason for Referral: Left knee pain  Referred By: Dr. Allan Soto    Subjective    \"L knee is feeling much better, the ex's are helping.\"    Pain:  L lateral knee 0/10 currently      Objective     Knee AROM (R/L in degrees): 3-110; 3-112 *end-range pain    MMT (R/L):   Iliopsoas- 4/5; 4-/5  Hip ER- 4+/5; 4+/5  Hip Abd- 3+/5; 3+/5  Glute max- 4/5; 4/5  Quadriceps- 5/5; 4+/5  Hamstrings- 4/5; 4/5    Length tests (R/L in degrees):  90/90 hamstrings- (22) / (25)    Outcome Measures:  LEFS: 38/80    Treatments:  Therapeutic Exercise:   Nu Step x 5 min  LP #65 3x10  KE #11 3x10  HSC #22 3x10  Seated HS stretch 10x10\"  Slant board calf stretch 10x10\"  6\" Step up / march x 10 ea  Cable Column 2p x 10 ea dir  Seated ice wrap EOS, L    Not Today:  QS w/ towel roll x10 w/ 5 sec hold  SLR x10  SAQ x10 w/ 5 sec hold  LSLR x10  Bridges x10    EDUCATION:  Access Code: [email or web address]  URL: [email or web address]  Date: 2024  Prepared by: Shaquille Simmons    Exercises  - Supine Quad Set  - 1 x daily - 7 x weekly - 1-2 sets - 10 reps - 5 seconds hold  - Active Straight Leg Raise with Quad Set  - 1 x daily - 7 x weekly - 1-2 sets - 10 reps - 1-2 seconds hold  - Supine Knee Extension Strengthening  - 1 x daily - 7 x weekly - 1-2 sets - 10 reps - 5 seconds " hold  - Sidelying Hip Abduction  - 1 x daily - 7 x weekly - 1-2 sets - 10 reps - 1-2 seconds hold  - Bridge  - 1 x daily - 7 x weekly - 1-2 sets - 10 reps - 2-3 hold    Goals:  Active       PT Problem       PT Goal 1       Start:  09/11/24    Expected End:  11/10/24       Pt will demonstrate pain-free L knee AROM for optimal performance of ADLs.         PT Goal 2       Start:  09/11/24    Expected End:  11/10/24       Increase strength in B hip and knee MMT by at least 1/3 grade to improve dynamic knee joint support with standing, walking and negotiating stairs.         PT Goal 3       Start:  09/11/24    Expected End:  11/10/24       Pt will report 0/10 L knee pain to improve all ADLs.         PT Goal 4       Start:  09/11/24    Expected End:  11/10/24       Improve LEFS by at least 9 points (MDIC).         PT Goal 5       Start:  09/11/24    Expected End:  11/10/24       Pt will demonstrate independence with HEP for proper self-management at home.

## 2024-10-01 ENCOUNTER — APPOINTMENT (OUTPATIENT)
Dept: INFECTIOUS DISEASES | Facility: CLINIC | Age: 79
End: 2024-10-01
Payer: MEDICARE

## 2024-10-01 DIAGNOSIS — Z87.440 HISTORY OF RECURRENT UTIS: Primary | ICD-10-CM

## 2024-10-01 PROCEDURE — 99214 OFFICE O/P EST MOD 30 MIN: CPT | Performed by: INTERNAL MEDICINE

## 2024-10-01 NOTE — PROGRESS NOTES
Update October 1, 2024  Chart reviewed since last visit.  No significant issues.  Continues on topical estrogen and single strength Bactrim every other day.  Patient very happy with the strategy as she has not had any UTIs.  Excellent tolerance of the medications.    Video visit, looked and sounded well    Patient is doing great with the current strategy.  Will continue this as long as it works.  As they have in the past I expressed concern that she may be at risk for getting colonized with Bactrim resistant bacteria, but this is work for quite a long time and will continue and do another follow-up visit in May    From 4/24  First visit in [facility].  We follow the patient for recurrent UTIs.  She is currently on topical estrogen and single strength Bactrim every other day- She has been doing good in this with no symptomatic UTIs.  Sounded well today.    We have told her in the past we do not always favor a strategy of chronic suppressive antibiotics in postmenopausal women because they get frequently colonized -but in her  this combination of low-dose single strength Bactrim and topical estrogen has it worked really well for her I will continue as long as it works.  We did refills and we will follow-up in October

## 2024-10-04 ENCOUNTER — APPOINTMENT (OUTPATIENT)
Dept: INFECTIOUS DISEASES | Facility: CLINIC | Age: 79
End: 2024-10-04
Payer: MEDICARE

## 2024-10-09 ENCOUNTER — DOCUMENTATION (OUTPATIENT)
Dept: PHYSICAL THERAPY | Facility: CLINIC | Age: 79
End: 2024-10-09
Payer: MEDICARE

## 2024-10-09 ENCOUNTER — APPOINTMENT (OUTPATIENT)
Dept: PHYSICAL THERAPY | Facility: CLINIC | Age: 79
End: 2024-10-09
Payer: MEDICARE

## 2024-10-09 NOTE — PROGRESS NOTES
Physical Therapy                 Therapy Communication Note    Patient Name: Tanya Fan  MRN: [id number]  Department:   Room: Room/bed info not found  Today's Date: 10/9/2024     Discipline: Physical Therapy    Missed Visit Reason:      Missed Time: Cancel    Comment:

## 2024-10-11 ENCOUNTER — APPOINTMENT (OUTPATIENT)
Dept: RADIOLOGY | Facility: CLINIC | Age: 79
End: 2024-10-11
Payer: MEDICARE

## 2024-10-18 ENCOUNTER — LAB (OUTPATIENT)
Dept: LAB | Facility: LAB | Age: 79
End: 2024-10-18
Payer: MEDICARE

## 2024-10-18 DIAGNOSIS — N30.00 ACUTE CYSTITIS WITHOUT HEMATURIA: ICD-10-CM

## 2024-10-18 PROCEDURE — 87186 SC STD MICRODIL/AGAR DIL: CPT

## 2024-10-18 PROCEDURE — 87086 URINE CULTURE/COLONY COUNT: CPT

## 2024-10-20 LAB — BACTERIA UR CULT: ABNORMAL

## 2024-10-21 DIAGNOSIS — N30.00 ACUTE CYSTITIS WITHOUT HEMATURIA: Primary | ICD-10-CM

## 2024-10-21 LAB — BACTERIA UR CULT: ABNORMAL

## 2024-10-21 RX ORDER — NITROFURANTOIN 25; 75 MG/1; MG/1
100 CAPSULE ORAL EVERY 12 HOURS SCHEDULED
Qty: 14 CAPSULE | Refills: 0 | Status: SHIPPED | OUTPATIENT
Start: 2024-10-21 | End: 2024-10-28

## 2024-10-22 ENCOUNTER — HOSPITAL ENCOUNTER (OUTPATIENT)
Dept: RADIOLOGY | Facility: CLINIC | Age: 79
Discharge: HOME | End: 2024-10-22
Payer: MEDICARE

## 2024-10-22 VITALS — WEIGHT: 167 LBS | BODY MASS INDEX: 28.51 KG/M2 | HEIGHT: 64 IN

## 2024-10-22 DIAGNOSIS — Z12.31 BREAST CANCER SCREENING BY MAMMOGRAM: ICD-10-CM

## 2024-10-22 PROCEDURE — 77067 SCR MAMMO BI INCL CAD: CPT | Performed by: RADIOLOGY

## 2024-10-22 PROCEDURE — 77063 BREAST TOMOSYNTHESIS BI: CPT | Performed by: RADIOLOGY

## 2024-10-22 PROCEDURE — 77067 SCR MAMMO BI INCL CAD: CPT

## 2024-10-23 ENCOUNTER — APPOINTMENT (OUTPATIENT)
Dept: PHYSICAL THERAPY | Facility: CLINIC | Age: 79
End: 2024-10-23
Payer: MEDICARE

## 2024-10-25 ENCOUNTER — DOCUMENTATION (OUTPATIENT)
Dept: PHYSICAL THERAPY | Facility: CLINIC | Age: 79
End: 2024-10-25
Payer: MEDICARE

## 2024-10-25 NOTE — PROGRESS NOTES
Physical Therapy    Discharge Summary    Name: Tanya Fan  MRN: [id number]  : 1945  Date: 10/25/24    Discharge Summary: PT    Discharge Information: Date of discharge 10/25/24, Date of last visit 24, Date of evaluation 24, Number of attended visits 2, Referred by Dr. Soto, and Referred for Left knee pain    Therapy Summary: Pt consisted of lower extremity strengthening, stretching, cryotherapy and education.    Discharge Status: Pt reported feeling much better at time of last visit.     Rehab Discharge Reason: Pt cancelled all remaining PT visits and insurance authorization .

## 2024-11-03 DIAGNOSIS — E11.9 TYPE 2 DIABETES MELLITUS WITHOUT COMPLICATION, WITHOUT LONG-TERM CURRENT USE OF INSULIN (MULTI): ICD-10-CM

## 2024-11-04 RX ORDER — EMPAGLIFLOZIN 10 MG/1
10 TABLET, FILM COATED ORAL DAILY
Qty: 100 TABLET | Refills: 2 | Status: SHIPPED | OUTPATIENT
Start: 2024-11-04

## 2024-11-12 DIAGNOSIS — E78.2 HYPERLIPIDEMIA, MIXED: ICD-10-CM

## 2024-11-12 RX ORDER — ROSUVASTATIN CALCIUM 20 MG/1
20 TABLET, COATED ORAL DAILY
Qty: 100 TABLET | Refills: 2 | Status: SHIPPED | OUTPATIENT
Start: 2024-11-12

## 2024-12-12 ENCOUNTER — LAB (OUTPATIENT)
Dept: LAB | Facility: LAB | Age: 79
End: 2024-12-12
Payer: MEDICARE

## 2024-12-12 DIAGNOSIS — N30.00 ACUTE CYSTITIS WITHOUT HEMATURIA: ICD-10-CM

## 2024-12-12 PROCEDURE — 87186 SC STD MICRODIL/AGAR DIL: CPT

## 2024-12-12 PROCEDURE — 87086 URINE CULTURE/COLONY COUNT: CPT

## 2024-12-13 DIAGNOSIS — N30.00 ACUTE CYSTITIS WITHOUT HEMATURIA: Primary | ICD-10-CM

## 2024-12-13 RX ORDER — CIPROFLOXACIN 250 MG/1
250 TABLET, FILM COATED ORAL 2 TIMES DAILY
Qty: 10 TABLET | Refills: 1 | Status: SHIPPED | OUTPATIENT
Start: 2024-12-13 | End: 2024-12-18

## 2024-12-14 LAB — BACTERIA UR CULT: ABNORMAL

## 2025-01-09 ENCOUNTER — LAB (OUTPATIENT)
Dept: LAB | Facility: LAB | Age: 80
End: 2025-01-09
Payer: MEDICARE

## 2025-01-09 DIAGNOSIS — E11.9 TYPE 2 DIABETES MELLITUS WITHOUT COMPLICATION, WITHOUT LONG-TERM CURRENT USE OF INSULIN (MULTI): ICD-10-CM

## 2025-01-09 DIAGNOSIS — N18.31 STAGE 3A CHRONIC KIDNEY DISEASE (MULTI): ICD-10-CM

## 2025-01-09 LAB
ALBUMIN SERPL BCP-MCNC: 4.5 G/DL (ref 3.4–5)
ANION GAP SERPL CALC-SCNC: 16 MMOL/L (ref 10–20)
BUN SERPL-MCNC: 27 MG/DL (ref 6–23)
CALCIUM SERPL-MCNC: 10.2 MG/DL (ref 8.6–10.6)
CHLORIDE SERPL-SCNC: 104 MMOL/L (ref 98–107)
CO2 SERPL-SCNC: 27 MMOL/L (ref 21–32)
CREAT SERPL-MCNC: 1.02 MG/DL (ref 0.5–1.05)
EGFRCR SERPLBLD CKD-EPI 2021: 57 ML/MIN/1.73M*2
EST. AVERAGE GLUCOSE BLD GHB EST-MCNC: 183 MG/DL
GLUCOSE SERPL-MCNC: 175 MG/DL (ref 74–99)
HBA1C MFR BLD: 8 %
PHOSPHATE SERPL-MCNC: 4.8 MG/DL (ref 2.5–4.9)
POTASSIUM SERPL-SCNC: 4.4 MMOL/L (ref 3.5–5.3)
SODIUM SERPL-SCNC: 143 MMOL/L (ref 136–145)

## 2025-01-09 PROCEDURE — 83036 HEMOGLOBIN GLYCOSYLATED A1C: CPT

## 2025-01-09 PROCEDURE — 80069 RENAL FUNCTION PANEL: CPT

## 2025-01-10 ENCOUNTER — APPOINTMENT (OUTPATIENT)
Dept: PRIMARY CARE | Facility: CLINIC | Age: 80
End: 2025-01-10
Payer: MEDICARE

## 2025-01-15 ENCOUNTER — APPOINTMENT (OUTPATIENT)
Dept: PRIMARY CARE | Facility: CLINIC | Age: 80
End: 2025-01-15
Payer: MEDICARE

## 2025-01-15 VITALS
TEMPERATURE: 97.9 F | SYSTOLIC BLOOD PRESSURE: 128 MMHG | OXYGEN SATURATION: 97 % | WEIGHT: 159 LBS | HEART RATE: 60 BPM | DIASTOLIC BLOOD PRESSURE: 82 MMHG | BODY MASS INDEX: 27.14 KG/M2 | HEIGHT: 64 IN

## 2025-01-15 DIAGNOSIS — E78.2 HYPERLIPIDEMIA, MIXED: ICD-10-CM

## 2025-01-15 DIAGNOSIS — N18.31 STAGE 3A CHRONIC KIDNEY DISEASE (MULTI): ICD-10-CM

## 2025-01-15 DIAGNOSIS — E55.9 VITAMIN D DEFICIENCY: ICD-10-CM

## 2025-01-15 DIAGNOSIS — E11.9 TYPE 2 DIABETES MELLITUS WITHOUT COMPLICATION, WITHOUT LONG-TERM CURRENT USE OF INSULIN (MULTI): Primary | ICD-10-CM

## 2025-01-15 DIAGNOSIS — I10 PRIMARY HYPERTENSION: ICD-10-CM

## 2025-01-15 PROBLEM — K50.10 CROHN'S DISEASE OF LARGE INTESTINE WITHOUT COMPLICATION (MULTI): Status: RESOLVED | Noted: 2024-03-14 | Resolved: 2025-01-15

## 2025-01-15 PROCEDURE — 1036F TOBACCO NON-USER: CPT | Performed by: INTERNAL MEDICINE

## 2025-01-15 PROCEDURE — 1123F ACP DISCUSS/DSCN MKR DOCD: CPT | Performed by: INTERNAL MEDICINE

## 2025-01-15 PROCEDURE — 1160F RVW MEDS BY RX/DR IN RCRD: CPT | Performed by: INTERNAL MEDICINE

## 2025-01-15 PROCEDURE — 1159F MED LIST DOCD IN RCRD: CPT | Performed by: INTERNAL MEDICINE

## 2025-01-15 PROCEDURE — 3074F SYST BP LT 130 MM HG: CPT | Performed by: INTERNAL MEDICINE

## 2025-01-15 PROCEDURE — 3079F DIAST BP 80-89 MM HG: CPT | Performed by: INTERNAL MEDICINE

## 2025-01-15 PROCEDURE — 99214 OFFICE O/P EST MOD 30 MIN: CPT | Performed by: INTERNAL MEDICINE

## 2025-01-15 PROCEDURE — 1158F ADVNC CARE PLAN TLK DOCD: CPT | Performed by: INTERNAL MEDICINE

## 2025-01-15 PROCEDURE — 1157F ADVNC CARE PLAN IN RCRD: CPT | Performed by: INTERNAL MEDICINE

## 2025-01-15 RX ORDER — EMPAGLIFLOZIN 10 MG/1
10 TABLET, FILM COATED ORAL DAILY
Qty: 100 TABLET | Refills: 2 | Status: SHIPPED | OUTPATIENT
Start: 2025-01-15

## 2025-01-15 ASSESSMENT — ENCOUNTER SYMPTOMS
APPETITE CHANGE: 0
SORE THROAT: 0
ACTIVITY CHANGE: 0
ABDOMINAL PAIN: 0
JOINT SWELLING: 0
SHORTNESS OF BREATH: 0
BACK PAIN: 0
WHEEZING: 0
NERVOUS/ANXIOUS: 0
ARTHRALGIAS: 0
WOUND: 0
SEIZURES: 0
VOMITING: 0
DYSURIA: 0
FATIGUE: 0
CONSTIPATION: 0
SINUS PAIN: 0
MYALGIAS: 0
DIARRHEA: 0
PHOTOPHOBIA: 0
FREQUENCY: 0
FACIAL ASYMMETRY: 0
TREMORS: 0
COLOR CHANGE: 0
HEADACHES: 0
NECK PAIN: 0
HALLUCINATIONS: 0
NAUSEA: 0
SLEEP DISTURBANCE: 0
PALPITATIONS: 0
BLOOD IN STOOL: 0
CHEST TIGHTNESS: 0
POLYPHAGIA: 0
TROUBLE SWALLOWING: 0
VOICE CHANGE: 0
WEAKNESS: 0
EYE PAIN: 0
COUGH: 0
STRIDOR: 0
SPEECH DIFFICULTY: 0
NUMBNESS: 0
FLANK PAIN: 0
HEMATURIA: 0
ADENOPATHY: 0
FEVER: 0
UNEXPECTED WEIGHT CHANGE: 0
DIZZINESS: 0
CONFUSION: 0
POLYDIPSIA: 0

## 2025-01-15 ASSESSMENT — PATIENT HEALTH QUESTIONNAIRE - PHQ9
SUM OF ALL RESPONSES TO PHQ9 QUESTIONS 1 AND 2: 0
2. FEELING DOWN, DEPRESSED OR HOPELESS: NOT AT ALL
1. LITTLE INTEREST OR PLEASURE IN DOING THINGS: NOT AT ALL

## 2025-01-15 NOTE — PROGRESS NOTES
Subjective   Patient ID: Tanya Fan is a 79 y.o. female who presents for Follow-up.    HPI   Patient presented to the office for follow up.  She had labs which has been reviewed and discussed with the patient.  She has no symptoms today.    Type 2 Diabetes mellitus- A1c decreased from 8.8 to 8.0. Patient is on metformin and jardiance. She need prescription of jardiance to be refilled.    Primary hypertension- BP is well controlled, 128/82.    Mixed hyperlipidemia- Lipid panel is normal from 3 years and patient is on Crestor.    Stage 3a CKD- Renal function panel is stable from past 3 years. Check it again in 6 months.    Vitamin D deficiency- Resolved.    Review of Systems   Constitutional:  Negative for activity change, appetite change, fatigue, fever and unexpected weight change.   HENT:  Negative for dental problem, ear discharge, hearing loss, nosebleeds, postnasal drip, sinus pain, sore throat, trouble swallowing and voice change.    Eyes:  Negative for photophobia, pain and visual disturbance.   Respiratory:  Negative for cough, chest tightness, shortness of breath, wheezing and stridor.    Cardiovascular:  Negative for chest pain, palpitations and leg swelling.   Gastrointestinal:  Negative for abdominal pain, blood in stool, constipation, diarrhea, nausea and vomiting.   Endocrine: Negative for polydipsia, polyphagia and polyuria.   Genitourinary:  Negative for decreased urine volume, dysuria, flank pain, frequency, hematuria and urgency.   Musculoskeletal:  Negative for arthralgias, back pain, gait problem, joint swelling, myalgias and neck pain.   Skin:  Negative for color change, rash and wound.   Allergic/Immunologic: Negative for environmental allergies and food allergies.   Neurological:  Negative for dizziness, tremors, seizures, syncope, facial asymmetry, speech difficulty, weakness, numbness and headaches.   Hematological:  Negative for adenopathy.   Psychiatric/Behavioral:  Negative for  "behavioral problems, confusion, hallucinations, self-injury, sleep disturbance and suicidal ideas. The patient is not nervous/anxious.      Objective   /82   Pulse 60   Temp 36.6 °C (97.9 °F)   Ht 1.626 m (5' 4\")   Wt 72.1 kg (159 lb)   SpO2 97%   BMI 27.29 kg/m²     Physical Exam  Vitals and nursing note reviewed.   Constitutional:       General: She is not in acute distress.     Appearance: Normal appearance. She is not ill-appearing or toxic-appearing.   HENT:      Head: Normocephalic.      Nose: Nose normal. No congestion.   Eyes:      General:         Right eye: No discharge.         Left eye: No discharge.      Conjunctiva/sclera: Conjunctivae normal.      Pupils: Pupils are equal, round, and reactive to light.   Cardiovascular:      Rate and Rhythm: Normal rate and regular rhythm.      Pulses: Normal pulses.      Heart sounds: Normal heart sounds. No murmur heard.     No gallop.   Pulmonary:      Effort: Pulmonary effort is normal. No respiratory distress.      Breath sounds: Normal breath sounds. No stridor. No wheezing, rhonchi or rales.   Abdominal:      General: Bowel sounds are normal.      Tenderness: There is no abdominal tenderness.   Musculoskeletal:         General: No deformity. Normal range of motion.      Cervical back: Normal range of motion and neck supple. No rigidity.      Right lower leg: No edema.      Left lower leg: No edema.   Skin:     General: Skin is warm.      Coloration: Skin is not jaundiced.      Findings: No bruising, erythema or rash.   Neurological:      General: No focal deficit present.      Mental Status: She is alert and oriented to person, place, and time.      Cranial Nerves: No cranial nerve deficit.      Gait: Gait normal.   Psychiatric:         Mood and Affect: Mood normal.         Behavior: Behavior normal.         Thought Content: Thought content normal.         Judgment: Judgment normal.       Assessment/Plan   Problem List Items Addressed This Visit  "      Primary hypertension     Continue current antihypertensive medications.         Hyperlipidemia, mixed     Continue rosuvastatin.          Type 2 diabetes mellitus without complication, without long-term current use of insulin (Multi) - Primary     Yearly eye exam to screen for diabetic retinopathy.         Relevant Medications    Jardiance 10 mg    Other Relevant Orders    Hemoglobin A1C    Albumin-Creatinine Ratio, Urine Random    Vitamin D deficiency     Vitamin D level is 43  Continue oral Vitamin D supplement.         Stage 3a chronic kidney disease (Multi)    Relevant Orders    Renal function panel

## 2025-01-16 ENCOUNTER — OFFICE VISIT (OUTPATIENT)
Dept: NEPHROLOGY | Facility: CLINIC | Age: 80
End: 2025-01-16
Payer: MEDICARE

## 2025-01-16 VITALS — SYSTOLIC BLOOD PRESSURE: 95 MMHG | DIASTOLIC BLOOD PRESSURE: 58 MMHG | HEART RATE: 64 BPM

## 2025-01-16 DIAGNOSIS — N18.31 STAGE 3A CHRONIC KIDNEY DISEASE (MULTI): ICD-10-CM

## 2025-01-16 DIAGNOSIS — N18.31 STAGE 3A CHRONIC KIDNEY DISEASE (MULTI): Primary | ICD-10-CM

## 2025-01-16 PROCEDURE — 3078F DIAST BP <80 MM HG: CPT | Performed by: INTERNAL MEDICINE

## 2025-01-16 PROCEDURE — 99203 OFFICE O/P NEW LOW 30 MIN: CPT | Performed by: INTERNAL MEDICINE

## 2025-01-16 PROCEDURE — 3074F SYST BP LT 130 MM HG: CPT | Performed by: INTERNAL MEDICINE

## 2025-01-16 PROCEDURE — 1159F MED LIST DOCD IN RCRD: CPT | Performed by: INTERNAL MEDICINE

## 2025-01-16 PROCEDURE — 1157F ADVNC CARE PLAN IN RCRD: CPT | Performed by: INTERNAL MEDICINE

## 2025-01-16 NOTE — PROGRESS NOTES
79F with history of DM2, HTN, nonobstructing renal stones, Crohns disease,  Hyperlipidemia, First degree heart block.    DM2: Diagnosed in: >20 years ago.  No evidence of retinopathy. Sees Ophthalmology daily.  Last a1c: 8.0 last check. Was 9.5 in 2022. 6.9 in 2020.   HTN: Diagnosed >20 years ago.  No orthostatic symptoms when standing.  Checks BP at home and is usually 120/60  Takes 2 advil daily on weekdays (chronic back pain when standing for too long at work)      Current Outpatient Medications:     amLODIPine (Norvasc) 2.5 mg tablet, TAKE 1 TABLET BY MOUTH ONCE  DAILY, Disp: 100 tablet, Rfl: 2    aspirin 81 mg EC tablet, Take 1 tablet (81 mg) by mouth once daily., Disp: , Rfl:     b complex vitamins capsule, Take 1 capsule by mouth once daily., Disp: , Rfl:     bisoproloL-hydrochlorothiazide (Ziac) 10-6.25 mg tablet, TAKE 1 AND 1/2 TABLETS BY MOUTH  DAILY WITH BREAKFAST, Disp: 150 tablet, Rfl: 3    blood sugar diagnostic (ONETOUCH ULTRA BLUE TEST STRIP MISC), 1 Box once daily., Disp: , Rfl:     cholecalciferol (Vitamin D-3) 50 mcg (2,000 unit) capsule, Take by mouth early in the morning.. Take daily as directed, Disp: , Rfl:     estradiol (Estrace) 0.01 % (0.1 mg/gram) vaginal cream, Insert into the vagina once daily. Apply 1 applicator full daily to the external area in and around the vagina, Disp: , Rfl:     Jardiance 10 mg, Take 1 tablet (10 mg) by mouth once daily., Disp: 100 tablet, Rfl: 2    lisinopril 40 mg tablet, TAKE 1 TABLET BY MOUTH ONCE  DAILY, Disp: 100 tablet, Rfl: 2    metFORMIN  mg 24 hr tablet, TAKE 2 TABLETS BY MOUTH TWICE  DAILY, Disp: 400 tablet, Rfl: 2    rosuvastatin (Crestor) 20 mg tablet, TAKE 1 TABLET BY MOUTH DAILY, Disp: 100 tablet, Rfl: 2    Vitals:    01/16/25 1539   BP: 95/58   Pulse: 64     NAD  RRR  CTABL  No edema BL  Abd soft/Nt/Nd    Lab Results   Component Value Date    WBC 6.4 09/06/2024    HGB 12.0 09/06/2024    HCT 37.0 09/06/2024    MCV 89 09/06/2024      09/06/2024     Lab Results   Component Value Date    GLUCOSE 175 (H) 01/09/2025    CALCIUM 10.2 01/09/2025     01/09/2025    K 4.4 01/09/2025    CO2 27 01/09/2025     01/09/2025    BUN 27 (H) 01/09/2025    CREATININE 1.02 01/09/2025         Assessment and Plan:    #CKD3a/A2  Baseline creatinine appears to be stable since 2017 around 0.9-1.2. GFR also stable in high 50s. Etiology possibly from chronic NSAID use as she takes advil twice daily. Will rule out other causes of CKD and proteinuria. Her diabetes was previous poorly controlled but she has no retinopathy so less likely for significant renal involvement. Bactrim also may be artificially inflating her creatinine which can be considered. Her UTIs appear to be tolerable by her and not severe enough that SGTL2 should be stopped and can be continued while on prophylaxis.  -Encouraged to avoid NSAIDs and use minimal necessary dose of tylenol for pain.  -Low sodium diet  -No changes to BP medications due to that she is asymptomatic.  -Can consider removing amlodipine if hypotension becomes a problem

## 2025-01-21 ENCOUNTER — APPOINTMENT (OUTPATIENT)
Dept: OPHTHALMOLOGY | Facility: CLINIC | Age: 80
End: 2025-01-21
Payer: MEDICARE

## 2025-02-06 DIAGNOSIS — Z87.440 HISTORY OF RECURRENT UTIS: Primary | ICD-10-CM

## 2025-02-06 DIAGNOSIS — I10 PRIMARY HYPERTENSION: ICD-10-CM

## 2025-02-06 RX ORDER — SULFAMETHOXAZOLE AND TRIMETHOPRIM 800; 160 MG/1; MG/1
1 TABLET ORAL 2 TIMES DAILY
Qty: 50 TABLET | Refills: 3 | Status: SHIPPED | OUTPATIENT
Start: 2025-02-06 | End: 2025-05-07

## 2025-02-10 DIAGNOSIS — I10 PRIMARY HYPERTENSION: ICD-10-CM

## 2025-02-10 DIAGNOSIS — Z87.440 HISTORY OF RECURRENT UTIS: Primary | ICD-10-CM

## 2025-02-10 RX ORDER — LISINOPRIL 40 MG/1
40 TABLET ORAL DAILY
Qty: 90 TABLET | Refills: 3 | Status: SHIPPED | OUTPATIENT
Start: 2025-02-10 | End: 2026-02-10

## 2025-02-10 RX ORDER — SULFAMETHOXAZOLE AND TRIMETHOPRIM 400; 80 MG/1; MG/1
1 TABLET ORAL EVERY OTHER DAY
Qty: 45 TABLET | Refills: 3 | Status: SHIPPED | OUTPATIENT
Start: 2025-02-10

## 2025-02-14 DIAGNOSIS — I10 PRIMARY HYPERTENSION: ICD-10-CM

## 2025-02-14 RX ORDER — BISOPROLOL FUMARATE AND HYDROCHLOROTHIAZIDE 10; 6.25 MG/1; MG/1
TABLET ORAL
Qty: 150 TABLET | Refills: 3 | Status: SHIPPED | OUTPATIENT
Start: 2025-02-14

## 2025-02-14 RX ORDER — AMLODIPINE BESYLATE 2.5 MG/1
2.5 TABLET ORAL DAILY
Qty: 100 TABLET | Refills: 3 | Status: SHIPPED | OUTPATIENT
Start: 2025-02-14 | End: 2026-03-21

## 2025-03-04 RX ORDER — LISINOPRIL 40 MG/1
40 TABLET ORAL DAILY
Qty: 100 TABLET | Refills: 2 | Status: SHIPPED | OUTPATIENT
Start: 2025-03-04

## 2025-03-11 ENCOUNTER — APPOINTMENT (OUTPATIENT)
Dept: OPHTHALMOLOGY | Facility: CLINIC | Age: 80
End: 2025-03-11
Payer: MEDICARE

## 2025-03-11 DIAGNOSIS — H52.03 HYPEROPIA, BILATERAL: ICD-10-CM

## 2025-03-11 DIAGNOSIS — E11.9 TYPE 2 DIABETES MELLITUS WITHOUT COMPLICATION, WITHOUT LONG-TERM CURRENT USE OF INSULIN (MULTI): Primary | ICD-10-CM

## 2025-03-11 DIAGNOSIS — H52.4 PRESBYOPIA: ICD-10-CM

## 2025-03-11 PROCEDURE — 92004 COMPRE OPH EXAM NEW PT 1/>: CPT | Performed by: OPHTHALMOLOGY

## 2025-03-11 PROCEDURE — 92015 DETERMINE REFRACTIVE STATE: CPT | Performed by: OPHTHALMOLOGY

## 2025-03-11 ASSESSMENT — ENCOUNTER SYMPTOMS
HEMATOLOGIC/LYMPHATIC NEGATIVE: 0
CARDIOVASCULAR NEGATIVE: 0
MUSCULOSKELETAL NEGATIVE: 0
NEUROLOGICAL NEGATIVE: 0
PSYCHIATRIC NEGATIVE: 0
ALLERGIC/IMMUNOLOGIC NEGATIVE: 0
ENDOCRINE NEGATIVE: 0
EYES NEGATIVE: 1
GASTROINTESTINAL NEGATIVE: 0
RESPIRATORY NEGATIVE: 0
CONSTITUTIONAL NEGATIVE: 0

## 2025-03-11 ASSESSMENT — REFRACTION_MANIFEST
METHOD_AUTOREFRACTION: 1
OD_ADD: +2.50
OS_CYLINDER: -1.00
OS_AXIS: 085
OD_AXIS: 100
OD_SPHERE: +1.25
OS_AXIS: 085
OS_SPHERE: +1.50
OD_CYLINDER: -0.75
OD_SPHERE: +1.25
OD_AXIS: 100
OS_ADD: +2.50
OS_CYLINDER: -1.00
OS_SPHERE: +1.50
OD_CYLINDER: -1.00

## 2025-03-11 ASSESSMENT — EXTERNAL EXAM - LEFT EYE: OS_EXAM: NORMAL

## 2025-03-11 ASSESSMENT — CUP TO DISC RATIO
OS_RATIO: .15
OD_RATIO: .15

## 2025-03-11 ASSESSMENT — REFRACTION_WEARINGRX
OS_CYLINDER: -0.75
OS_AXIS: 075
OD_ADD: +2.50
SPECS_TYPE: PAL
OS_ADD: +2.50
OD_SPHERE: +1.50
OD_CYLINDER: -1.00
OD_AXIS: 096
OS_SPHERE: +1.25

## 2025-03-11 ASSESSMENT — VISUAL ACUITY
OD_CC: 20/20
CORRECTION_TYPE: GLASSES
OS_CC: 20/20
METHOD: SNELLEN - LINEAR

## 2025-03-11 ASSESSMENT — SLIT LAMP EXAM - LIDS
COMMENTS: GOOD POSITION
COMMENTS: GOOD POSITION

## 2025-03-11 ASSESSMENT — CONF VISUAL FIELD
OD_SUPERIOR_TEMPORAL_RESTRICTION: 0
OD_INFERIOR_NASAL_RESTRICTION: 0
OD_SUPERIOR_NASAL_RESTRICTION: 0
OS_INFERIOR_NASAL_RESTRICTION: 0
OD_INFERIOR_TEMPORAL_RESTRICTION: 0
OS_SUPERIOR_NASAL_RESTRICTION: 0
OS_INFERIOR_TEMPORAL_RESTRICTION: 0
OS_NORMAL: 1
OS_SUPERIOR_TEMPORAL_RESTRICTION: 0
OD_NORMAL: 1

## 2025-03-11 ASSESSMENT — TONOMETRY
IOP_METHOD: GOLDMANN APPLANATION
OS_IOP_MMHG: 16
OD_IOP_MMHG: 16

## 2025-03-11 ASSESSMENT — EXTERNAL EXAM - RIGHT EYE: OD_EXAM: NORMAL

## 2025-03-11 NOTE — PROGRESS NOTES
Assessment/Plan   Diagnoses and all orders for this visit:  Type 2 diabetes mellitus without complication, without long-term current use of insulin (Multi)  -     Referral to Ophthalmology  no retinopathy observed on exam today od/os, pt ed to continue good BGlc, blood pressure and lipid control, rtc with any changes in vision, otherwise monitor 1 year   Hyperopia, bilateral  Presbyopia  Glasses prescription given to patient today

## 2025-03-20 DIAGNOSIS — E78.2 HYPERLIPIDEMIA, MIXED: ICD-10-CM

## 2025-03-20 RX ORDER — ROSUVASTATIN CALCIUM 20 MG/1
20 TABLET, COATED ORAL DAILY
Qty: 100 TABLET | Refills: 2 | Status: SHIPPED | OUTPATIENT
Start: 2025-03-20

## 2025-04-08 ENCOUNTER — APPOINTMENT (OUTPATIENT)
Dept: GASTROENTEROLOGY | Facility: EXTERNAL LOCATION | Age: 80
End: 2025-04-08
Payer: MEDICARE

## 2025-04-22 DIAGNOSIS — N39.0 RECURRENT URINARY TRACT INFECTION: Primary | ICD-10-CM

## 2025-04-25 DIAGNOSIS — N39.0 RECURRENT URINARY TRACT INFECTION: ICD-10-CM

## 2025-05-02 ENCOUNTER — APPOINTMENT (OUTPATIENT)
Dept: INFECTIOUS DISEASES | Facility: CLINIC | Age: 80
End: 2025-05-02
Payer: MEDICARE

## 2025-05-02 DIAGNOSIS — N39.0 RECURRENT URINARY TRACT INFECTION: ICD-10-CM

## 2025-05-02 DIAGNOSIS — Z87.440 HISTORY OF RECURRENT UTIS: Primary | ICD-10-CM

## 2025-05-02 DIAGNOSIS — N30.00 ACUTE CYSTITIS WITHOUT HEMATURIA: ICD-10-CM

## 2025-05-02 PROCEDURE — 1157F ADVNC CARE PLAN IN RCRD: CPT | Performed by: INTERNAL MEDICINE

## 2025-05-02 PROCEDURE — 99214 OFFICE O/P EST MOD 30 MIN: CPT | Performed by: INTERNAL MEDICINE

## 2025-05-02 NOTE — PROGRESS NOTES
Update May 2, 2025  All data in epic reviewed since her last visit  Patient reports doing well overall.  There is a breakthrough UTI in December with Klebsiella for which she took a brief course of Cipro.  Had some symptoms of UTI in April but these resolved and the culture was negative  Currently feels well with no symptoms of UTI    Video visit    Assessment-patient very happy with the current strategy of topical estrogen and every other day SS Bactrim.  We discussed in the past that he would be an ongoing risk of colonization with bactrim  resistant but she has done really well on the strategy and will continue for now.  We have a standing order for urine culture should she develop symptoms I will do a follow-up visit in October    Virtual or Telephone Consent    An interactive audio and video telecommunication system which permits real time communications between the patient (at the originating site) and provider (at the distant site) was utilized to provide this telehealth service.   Verbal consent was requested and obtained from Tanya Fan on this date, 05/02/25 for a telehealth visit and the patient's location was confirmed at the time of the visit.     Time spent on this encounter including chart review and preparation time on the day of service, time with the patient, documentation time and coordination of care was 23 minutes.      Update October 1, 2024  Chart reviewed since last visit.  No significant issues.  Continues on topical estrogen and single strength Bactrim every other day.  Patient very happy with the strategy as she has not had any UTIs.  Excellent tolerance of the medications.    Video visit, looked and sounded well    Patient is doing great with the current strategy.  Will continue this as long as it works.  As they have in the past I expressed concern that she may be at risk for getting colonized with Bactrim resistant bacteria, but this is work for quite a long time and will continue  and do another follow-up visit in May    From 4/24  First visit in [facility].  We follow the patient for recurrent UTIs.  She is currently on topical estrogen and single strength Bactrim every other day- She has been doing good in this with no symptomatic UTIs.  Sounded well today.    We have told her in the past we do not always favor a strategy of chronic suppressive antibiotics in postmenopausal women because they get frequently colonized -but in her  this combination of low-dose single strength Bactrim and topical estrogen has it worked really well for her I will continue as long as it works.  We did refills and we will follow-up in October

## 2025-05-09 DIAGNOSIS — N39.0 RECURRENT URINARY TRACT INFECTION: ICD-10-CM

## 2025-05-16 DIAGNOSIS — N39.0 RECURRENT URINARY TRACT INFECTION: ICD-10-CM

## 2025-05-23 DIAGNOSIS — N39.0 RECURRENT URINARY TRACT INFECTION: ICD-10-CM

## 2025-05-30 DIAGNOSIS — N39.0 RECURRENT URINARY TRACT INFECTION: ICD-10-CM

## 2025-06-06 DIAGNOSIS — N39.0 RECURRENT URINARY TRACT INFECTION: ICD-10-CM

## 2025-06-13 DIAGNOSIS — N39.0 RECURRENT URINARY TRACT INFECTION: ICD-10-CM

## 2025-06-20 DIAGNOSIS — N39.0 RECURRENT URINARY TRACT INFECTION: ICD-10-CM

## 2025-06-23 ENCOUNTER — APPOINTMENT (OUTPATIENT)
Dept: GASTROENTEROLOGY | Facility: CLINIC | Age: 80
End: 2025-06-23
Payer: MEDICARE

## 2025-06-23 VITALS — BODY MASS INDEX: 27.08 KG/M2 | HEART RATE: 65 BPM | HEIGHT: 64 IN | WEIGHT: 158.6 LBS

## 2025-06-23 DIAGNOSIS — Z91.09 OTHER ALLERGY STATUS, OTHER THAN TO DRUGS AND BIOLOGICAL SUBSTANCES: ICD-10-CM

## 2025-06-23 DIAGNOSIS — R19.7 DIARRHEA, UNSPECIFIED TYPE: Primary | ICD-10-CM

## 2025-06-23 PROCEDURE — 99215 OFFICE O/P EST HI 40 MIN: CPT | Performed by: INTERNAL MEDICINE

## 2025-06-23 PROCEDURE — 1036F TOBACCO NON-USER: CPT | Performed by: INTERNAL MEDICINE

## 2025-06-23 PROCEDURE — 1159F MED LIST DOCD IN RCRD: CPT | Performed by: INTERNAL MEDICINE

## 2025-06-23 ASSESSMENT — ENCOUNTER SYMPTOMS
ARTHRALGIAS: 0
DIFFICULTY URINATING: 0
UNEXPECTED WEIGHT CHANGE: 0
SHORTNESS OF BREATH: 0
FATIGUE: 0
HEADACHES: 0
FEVER: 0
ROS GI COMMENTS: AS PER HPI
CHILLS: 0
MYALGIAS: 0

## 2025-06-23 NOTE — PROGRESS NOTES
Assessment/Plan    Tanya Fan is a 80 y.o. female with PMHx of DM2 (last A1c 8.0%), HTN, HLD, arthritis, CKD who presents to GI clinic for evaluation of intermittent bouts of diarrhea.    These bouts of diarrhea happen suddenly and urgently, and she has had times where she has not been able to make it to the bathroom.  Usually she will have one episode of diarrhea, then she will take Imodium and the diarrhea will stop.  The episodes only happen once every few weeks.  She has been unable to identify a clear trigger for the episodes.  The episodes are unpredictable, so she is always wondering when she is going to have more diarrhea.  We discussed these symptoms a few years ago and did not come up with a clear answer.  Based on the intermittent nature of the symptoms it sounds like there is some irritant causing the symptoms.  A chronic process such as IBD or celiac should cause more regular symptoms, along with other symptoms such as weight loss or abdominal pain.  Will check food allergy profile to see if this can help in the evaluation.  She will also try to keep a food journal to see if she can more easily identify common foods for the days when she has diarrhea.  She can keep using Imodium PRN.   She has been having some mild rectal bleeding, and we discussed colonoscopy for further evaluation - she will continue to monitor her stools and let us know if the bleeding worsens.         Subjective     History of Present Illness   Tanya Fan is a 80 y.o. female with PMHx of DM2 (last A1c 8.0%), HTN, HLD, arthritis, CKD who presents to GI clinic for further evaluation of intermittent diarrhea.  Can get random bouts of diarrhea, one episode, will take Imodium and it will resolve (rarely happens a 2nd or 3rd time)  In-between these episodes her bowels are normal  Eats lots of roughage, fruit, water  Some bright red blood after some BMs, normal stool - past month or 6 weeks  No rectal pain or abdominal pain  No  weight changes    Past history 8/17/22:  For the past 6 months or so, has had episodes of diarrhea  Diarrhea can happen at any time of day, will usually just be one episode but it is significant and urgent  She thought it is related to eating cereal so she stopped this and now episodes are much less frequent  She has been eating cereal for breakfast every day for many years, so is confused why this is causing problems now  Has other dairy products i.e. ice cream without any problems  No abdominal pain or rectal bleeding  Little bit of weight loss  Has had hysterectomy, craniotomy  There is chart history of colon inflammation which was initially thought to be IBD, but eventually was found to be related to aspartame intake  Colonoscopy 7/2018 ([facility]) with diverticulosis but otherwise normal, with completely normal colon biopsies, no signs of IBD    Past Medical History  As per HPI.     Social History  she  reports that she has never smoked. She has never used smokeless tobacco. She reports that she does not drink alcohol and does not use drugs.     Family History  her family history includes Diabetes in an other family member; Hearing loss in an other family member; Heart disease in an other family member; Hypertension in an other family member; Stroke in her father.     Review of Systems  Review of Systems   Constitutional:  Negative for chills, fatigue, fever and unexpected weight change.   Respiratory:  Negative for shortness of breath.    Cardiovascular:  Negative for chest pain.   Gastrointestinal:         As per HPI   Genitourinary:  Negative for difficulty urinating.   Musculoskeletal:  Negative for arthralgias and myalgias.   Neurological:  Negative for headaches.   All other systems reviewed and are negative.      Allergies  RX Allergies[1]    Medications  Current Outpatient Medications   Medication Instructions    amLODIPine (NORVASC) 2.5 mg, oral, Daily    aspirin 81 mg EC tablet 1 tablet, Daily    b  "complex vitamins capsule 1 capsule, Daily    bisoproloL-hydrochlorothiazide (Ziac) 10-6.25 mg tablet TAKE 1 AND 1/2 TABLETS BY MOUTH  DAILY WITH BREAKFAST    blood sugar diagnostic (ONETOUCH ULTRA BLUE TEST STRIP MISC) 1 Box, Daily    cholecalciferol (Vitamin D-3) 50 mcg (2,000 unit) capsule Daily    estradiol (Estrace) 0.01 % (0.1 mg/gram) vaginal cream Daily    Jardiance 10 mg, oral, Daily    Lactobacillus acidophilus (PROBIOTIC ORAL) 1 tablet, Daily    lisinopril 40 mg, oral, Daily    lisinopril 40 mg, oral, Daily    metFORMIN XR (GLUCOPHAGE-XR) 1,000 mg, oral, 2 times daily    rosuvastatin (CRESTOR) 20 mg, oral, Daily    sulfamethoxazole-trimethoprim (Bactrim) 400-80 mg tablet 1 tablet, oral, Every other day        Objective     Visit Vitals  Pulse 65   Ht 1.626 m (5' 4\")   Wt 71.9 kg (158 lb 9.6 oz)   BMI 27.22 kg/m²   OB Status Hysterectomy   Smoking Status Never   BSA 1.8 m²       Physical Exam  Constitutional:       General: She is not in acute distress.  Eyes:      Extraocular Movements: Extraocular movements intact.   Abdominal:      General: Bowel sounds are normal. There is no distension.      Palpations: Abdomen is soft.      Tenderness: There is no abdominal tenderness. There is no guarding or rebound.   Skin:     General: Skin is warm and dry.   Neurological:      General: No focal deficit present.      Mental Status: She is alert.   Psychiatric:         Mood and Affect: Mood normal.         Behavior: Behavior normal.           Lab Results   Component Value Date    WBC 6.4 09/06/2024    WBC 13.2 (H) 12/03/2023    HGB 12.0 09/06/2024    HGB 11.9 (L) 12/03/2023    HCT 37.0 09/06/2024    HCT 36.7 12/03/2023    MCV 89 09/06/2024    MCV 87 12/03/2023     09/06/2024     12/03/2023     Lab Results   Component Value Date     01/09/2025     09/06/2024    K 4.4 01/09/2025    K 4.4 09/06/2024     01/09/2025     09/06/2024    CO2 27 01/09/2025    CO2 26 09/06/2024    BUN 27 " (H) 01/09/2025    BUN 23 09/06/2024    CREATININE 1.02 01/09/2025    CREATININE 1.05 09/06/2024    CALCIUM 10.2 01/09/2025    CALCIUM 9.8 09/06/2024    PROT 6.4 09/06/2024    PROT 6.9 11/29/2023    BILITOT 0.4 09/06/2024    BILITOT 0.4 11/29/2023    ALKPHOS 60 09/06/2024    ALKPHOS 67 11/29/2023    ALT 15 09/06/2024    ALT 15 11/29/2023    AST 16 09/06/2024    AST 14 11/29/2023    GLUCOSE 175 (H) 01/09/2025    GLUCOSE 197 (H) 09/06/2024       Recent Imaging  Imaging  No results found.    Cardiology, Vascular, and Other Imaging  No other imaging results found for the past 7 days        Hiro Stephens MD      Time Spent  Prep time on day of patient encounter: 7 minutes  Time spent directly with patient, family or caregiver: 32 minutes  Additional Time Spent on Patient Care Activities: 0 minutes  Documentation Time: 6 minutes  Other Time Spent: 0 minutes  Total: 45 minutes             [1]   Allergies  Allergen Reactions    Amoxicillin-Pot Clavulanate Unknown    Doxycycline Unknown    Aspartame GI bleeding and Unknown     Bleeding in colon

## 2025-06-23 NOTE — PATIENT INSTRUCTIONS
Let's check some blood work to see if there is an allergen that causes intermittent symptoms.  If bleeding gets worse we may need to consider another colonoscopy.    You can also do a food journal to see if you can more easily find similarities between days when you have diarrhea.

## 2025-06-26 DIAGNOSIS — E11.9 TYPE 2 DIABETES MELLITUS WITHOUT COMPLICATION, WITHOUT LONG-TERM CURRENT USE OF INSULIN: ICD-10-CM

## 2025-06-26 LAB
COCOA IGE AB [PRESENCE] IN SERUM BY RADIOALLERGOSORBENT TEST (RAST): 0
COCOA IGE QN: <0.1 KU/L
QUEST FLEXITEST1 RESULTS:: NORMAL

## 2025-06-27 DIAGNOSIS — N39.0 RECURRENT URINARY TRACT INFECTION: ICD-10-CM

## 2025-07-04 DIAGNOSIS — N39.0 RECURRENT URINARY TRACT INFECTION: ICD-10-CM

## 2025-07-06 RX ORDER — METFORMIN HYDROCHLORIDE 500 MG/1
1000 TABLET, EXTENDED RELEASE ORAL 2 TIMES DAILY
Qty: 400 TABLET | Refills: 2 | Status: SHIPPED | OUTPATIENT
Start: 2025-07-06

## 2025-07-07 ENCOUNTER — LAB (OUTPATIENT)
Dept: LAB | Facility: HOSPITAL | Age: 80
End: 2025-07-07
Payer: MEDICARE

## 2025-07-07 DIAGNOSIS — N18.31 STAGE 3A CHRONIC KIDNEY DISEASE (MULTI): ICD-10-CM

## 2025-07-07 LAB
PROT SERPL-MCNC: 6.7 G/DL (ref 6.4–8.2)
PROT UR-ACNC: 8 MG/DL (ref 5–25)

## 2025-07-07 PROCEDURE — 84155 ASSAY OF PROTEIN SERUM: CPT

## 2025-07-07 PROCEDURE — 84156 ASSAY OF PROTEIN URINE: CPT

## 2025-07-07 PROCEDURE — 84165 PROTEIN E-PHORESIS SERUM: CPT

## 2025-07-07 PROCEDURE — 86335 IMMUNFIX E-PHORSIS/URINE/CSF: CPT

## 2025-07-07 PROCEDURE — 83521 IG LIGHT CHAINS FREE EACH: CPT

## 2025-07-07 PROCEDURE — 86334 IMMUNOFIX E-PHORESIS SERUM: CPT

## 2025-07-07 PROCEDURE — 84166 PROTEIN E-PHORESIS/URINE/CSF: CPT

## 2025-07-08 LAB
ALBUMIN SERPL-MCNC: 4.2 G/DL (ref 3.6–5.1)
ALBUMIN/CREAT UR: 11 MG/G CREAT
APPEARANCE UR: CLEAR
BACTERIA #/AREA URNS HPF: ABNORMAL /HPF
BILIRUB UR QL STRIP: NEGATIVE
BUN SERPL-MCNC: 22 MG/DL (ref 7–25)
BUN/CREAT SERPL: 21 (CALC) (ref 6–22)
CALCIUM SERPL-MCNC: 10 MG/DL (ref 8.6–10.4)
CHLORIDE SERPL-SCNC: 105 MMOL/L (ref 98–110)
CO2 SERPL-SCNC: 23 MMOL/L (ref 20–32)
COLOR UR: YELLOW
CREAT SERPL-MCNC: 1.05 MG/DL (ref 0.6–1)
CREAT UR-MCNC: 35 MG/DL (ref 20–275)
EGFRCR SERPLBLD CKD-EPI 2021: 54 ML/MIN/1.73M2
GLUCOSE SERPL-MCNC: 178 MG/DL (ref 65–99)
GLUCOSE UR QL STRIP: ABNORMAL
HBV SURFACE AG SERPL QL IA: NORMAL
HCV AB SERPL QL IA: NORMAL
HGB UR QL STRIP: NEGATIVE
HIV 1+2 AB+HIV1 P24 AG SERPL QL IA: NORMAL
HIV 1+2 AB+HIV1 P24 AG SERPL QL IA: NORMAL
HYALINE CASTS #/AREA URNS LPF: ABNORMAL /LPF
KAPPA LC SERPL-MCNC: 2.57 MG/DL (ref 0.33–1.94)
KAPPA LC/LAMBDA SER: 1.49 {RATIO} (ref 0.26–1.65)
KETONES UR QL STRIP: NEGATIVE
LAMBDA LC SERPL-MCNC: 1.73 MG/DL (ref 0.57–2.63)
LEUKOCYTE ESTERASE UR QL STRIP: ABNORMAL
MICROALBUMIN UR-MCNC: 0.4 MG/DL
NITRITE UR QL STRIP: NEGATIVE
PH UR STRIP: 5.5 [PH] (ref 5–8)
PHOSPHATE SERPL-MCNC: 4.1 MG/DL (ref 2.1–4.3)
POTASSIUM SERPL-SCNC: 4.5 MMOL/L (ref 3.5–5.3)
PROT UR QL STRIP: NEGATIVE
RBC #/AREA URNS HPF: ABNORMAL /HPF
SERVICE CMNT-IMP: ABNORMAL
SODIUM SERPL-SCNC: 140 MMOL/L (ref 135–146)
SP GR UR STRIP: 1.02 (ref 1–1.03)
SQUAMOUS #/AREA URNS HPF: ABNORMAL /HPF
WBC #/AREA URNS HPF: ABNORMAL /HPF

## 2025-07-10 ENCOUNTER — APPOINTMENT (OUTPATIENT)
Dept: NEPHROLOGY | Facility: CLINIC | Age: 80
End: 2025-07-10
Payer: MEDICARE

## 2025-07-10 VITALS
DIASTOLIC BLOOD PRESSURE: 54 MMHG | HEIGHT: 64 IN | BODY MASS INDEX: 26.98 KG/M2 | SYSTOLIC BLOOD PRESSURE: 105 MMHG | WEIGHT: 158 LBS | HEART RATE: 66 BPM

## 2025-07-10 DIAGNOSIS — R82.71 BACTERIURIA: ICD-10-CM

## 2025-07-10 DIAGNOSIS — N18.31 STAGE 3A CHRONIC KIDNEY DISEASE (MULTI): Primary | ICD-10-CM

## 2025-07-10 LAB
ALBUMIN MFR UR ELPH: 30.3 %
ALBUMIN: 3.9 G/DL (ref 3.4–5)
ALPHA 1 GLOBULIN: 0.3 G/DL (ref 0.2–0.6)
ALPHA 2 GLOBULIN: 1 G/DL (ref 0.4–1.1)
ALPHA1 GLOB MFR UR ELPH: 17.1 %
ALPHA2 GLOB MFR UR ELPH: 18.8 %
B-GLOBULIN MFR UR ELPH: 21.5 %
BETA GLOBULIN: 0.8 G/DL (ref 0.5–1.2)
GAMMA GLOB MFR UR ELPH: 12.3 %
GAMMA GLOBULIN: 0.6 G/DL (ref 0.5–1.4)
IMMUNOFIXATION COMMENT: NORMAL
IMMUNOFIXATION COMMENT: NORMAL
PATH REVIEW - SERUM IMMUNOFIXATION: NORMAL
PATH REVIEW - URINE IMMUNOFIXATION: NORMAL
PATH REVIEW-SERUM PROTEIN ELECTROPHORESIS: NORMAL
PATH REVIEW-URINE PROTEIN ELECTROPHORESIS: NORMAL
PROTEIN ELECTROPHORESIS COMMENT: NORMAL
URINE ELECTROPHORESIS COMMENT: NORMAL

## 2025-07-10 PROCEDURE — 1126F AMNT PAIN NOTED NONE PRSNT: CPT | Performed by: INTERNAL MEDICINE

## 2025-07-10 PROCEDURE — 99213 OFFICE O/P EST LOW 20 MIN: CPT | Performed by: INTERNAL MEDICINE

## 2025-07-10 PROCEDURE — 3074F SYST BP LT 130 MM HG: CPT | Performed by: INTERNAL MEDICINE

## 2025-07-10 PROCEDURE — 1159F MED LIST DOCD IN RCRD: CPT | Performed by: INTERNAL MEDICINE

## 2025-07-10 PROCEDURE — 3078F DIAST BP <80 MM HG: CPT | Performed by: INTERNAL MEDICINE

## 2025-07-10 ASSESSMENT — PATIENT HEALTH QUESTIONNAIRE - PHQ9
1. LITTLE INTEREST OR PLEASURE IN DOING THINGS: NOT AT ALL
2. FEELING DOWN, DEPRESSED OR HOPELESS: NOT AT ALL
SUM OF ALL RESPONSES TO PHQ9 QUESTIONS 1 & 2: 0

## 2025-07-10 ASSESSMENT — PAIN SCALES - GENERAL: PAINLEVEL_OUTOF10: 0-NO PAIN

## 2025-07-10 NOTE — PATIENT INSTRUCTIONS
stop the amlodipine, check BP at home, if less than 120/80, no need to take amlodipine; if more than 130/85 mm , re-start amlodipine, for numbers in between call me  Next time you have symptoms of UTI, please check urine culture before increasing the dose of Bactrim  See me in 6 mo

## 2025-07-10 NOTE — PROGRESS NOTES
"For follow up, doing well.  No complaints  Had symptoms of UTI, so took Bactrim SS BID x 2 days, did not check culture  Not checking BP at home  Denies orthostatic symptoms    RoS negative for all other systems except as noted above.        1/16/2025     3:38 PM 1/16/2025     3:39 PM 6/23/2025     4:05 PM 7/10/2025     3:12 PM 7/10/2025     3:27 PM 7/10/2025     3:28 PM 7/10/2025     3:29 PM   Vitals   Systolic 110 95  109 113 110 105   Diastolic 57 58  56 58 56 54   BP Location Left arm Left arm        Heart Rate 60 64 65 66 66 67 66   Height   1.626 m (5' 4\")    1.626 m (5' 4\")   Weight (lb)   158.6 158      BMI   27.22 kg/m2 27.12 kg/m2   27.12 kg/m2   BSA (m2)   1.8 m2 1.8 m2   1.8 m2   Visit Report Report Report Report Report Report Report Report     No distress  HEENT:  moist, no pallor  No edema avani LE  Breath sounds avani equal, clear  S1 S2 regular, normal, no rub or murmur  Abdomen soft  AAO x3, non focal    Lab Results   Component Value Date     07/07/2025     01/09/2025     09/06/2024    K 4.5 07/07/2025    K 4.4 01/09/2025    K 4.4 09/06/2024     07/07/2025     01/09/2025     09/06/2024    CO2 23 07/07/2025    CO2 27 01/09/2025    CO2 26 09/06/2024    BUN 22 07/07/2025    BUN 27 (H) 01/09/2025    BUN 23 09/06/2024    CREATININE 1.05 (H) 07/07/2025    CREATININE 1.02 01/09/2025    CREATININE 1.05 09/06/2024    CALCIUM 10.0 07/07/2025    CALCIUM 10.2 01/09/2025    CALCIUM 9.8 09/06/2024    PHOS 4.1 07/07/2025    PHOS 4.8 01/09/2025    PHOS 3.9 12/02/2023    VITD25 43 09/06/2024    VITD25 40 03/13/2024    VITD25 45 09/01/2023    MICROALBCREA 11 07/07/2025    MICROALBCREA 18.6 03/13/2024    MICROALBCREA 14.4 09/01/2023    HGB 12.0 09/06/2024    HGB 11.9 (L) 12/03/2023    HGB 12.7 12/02/2023      Current Outpatient Medications   Medication Instructions    amLODIPine (NORVASC) 2.5 mg, oral, Daily    aspirin 81 mg EC tablet 1 tablet, Daily    b complex vitamins capsule 1 " capsule, Daily    bisoproloL-hydrochlorothiazide (Ziac) 10-6.25 mg tablet TAKE 1 AND 1/2 TABLETS BY MOUTH  DAILY WITH BREAKFAST    blood sugar diagnostic (ONETOUCH ULTRA BLUE TEST STRIP MISC) 1 Box, Daily    cholecalciferol (Vitamin D-3) 50 mcg (2,000 unit) capsule Daily    estradiol (Estrace) 0.01 % (0.1 mg/gram) vaginal cream Daily    Jardiance 10 mg, oral, Daily    Lactobacillus acidophilus (PROBIOTIC ORAL) 1 tablet, Daily    lisinopril 40 mg, oral, Daily    lisinopril 40 mg, oral, Daily    metFORMIN XR (GLUCOPHAGE-XR) 1,000 mg, oral, 2 times daily    rosuvastatin (CRESTOR) 20 mg, oral, Daily    sulfamethoxazole-trimethoprim (Bactrim) 400-80 mg tablet 1 tablet, oral, Every other day     80-year-old with longstanding history of hypertension diabetes and CKD for follow-up    # CKD G3 a A1: Likely due to longstanding hypertension and diabetes.  Workup for CKD including hepatitis B hepatitis C, HIV serologies negative, kappa lambda ratio normal, urine albumin creatinine ratio 11, serum and urine immunofixation electrophoresis negative.  Repeat creatinine on 7/7/2025 is 1.0 mg per DL with EGFR 54 mL/min, stable.  She is tolerating Jardiance and her frequency of recurrent UTIs significantly reduced since starting chronic suppressive therapy with Bactrim.  She follows with ID for this.  Reinforced continuing avoidance of NSAIDs, okay to use Tylenol.    # Hypertension: Goal blood pressure 120/80, blood pressure on the lower side.Advised to hold amlodipine and monitor blood pressure at home.  If blood pressure continues to be less than 120/80 mmHg, no need to resume amlodipine; if persistently more than 130/85 mmHg, resume amlodipine.  Advised to call me for readings in between the 2 numbers.    RTC: 6 months

## 2025-07-11 DIAGNOSIS — N39.0 RECURRENT URINARY TRACT INFECTION: ICD-10-CM

## 2025-07-15 ENCOUNTER — PATIENT MESSAGE (OUTPATIENT)
Dept: GASTROENTEROLOGY | Facility: CLINIC | Age: 80
End: 2025-07-15
Payer: MEDICARE

## 2025-07-15 DIAGNOSIS — K62.5 RECTAL BLEEDING: Primary | ICD-10-CM

## 2025-07-17 ENCOUNTER — HOSPITAL ENCOUNTER (OUTPATIENT)
Dept: RADIOLOGY | Facility: CLINIC | Age: 80
Discharge: HOME | End: 2025-07-17
Payer: MEDICARE

## 2025-07-17 ENCOUNTER — APPOINTMENT (OUTPATIENT)
Dept: ORTHOPEDIC SURGERY | Facility: CLINIC | Age: 80
End: 2025-07-17
Payer: MEDICARE

## 2025-07-17 VITALS — BODY MASS INDEX: 26.95 KG/M2 | WEIGHT: 157 LBS

## 2025-07-17 DIAGNOSIS — M17.11 PRIMARY OSTEOARTHRITIS OF RIGHT KNEE: Primary | ICD-10-CM

## 2025-07-17 DIAGNOSIS — M25.561 RIGHT KNEE PAIN, UNSPECIFIED CHRONICITY: ICD-10-CM

## 2025-07-17 PROCEDURE — 1159F MED LIST DOCD IN RCRD: CPT | Performed by: ORTHOPAEDIC SURGERY

## 2025-07-17 PROCEDURE — 99213 OFFICE O/P EST LOW 20 MIN: CPT | Performed by: ORTHOPAEDIC SURGERY

## 2025-07-17 PROCEDURE — 73562 X-RAY EXAM OF KNEE 3: CPT | Mod: RT

## 2025-07-17 PROCEDURE — 99212 OFFICE O/P EST SF 10 MIN: CPT | Mod: 25

## 2025-07-17 PROCEDURE — 2500000004 HC RX 250 GENERAL PHARMACY W/ HCPCS (ALT 636 FOR OP/ED): Performed by: ORTHOPAEDIC SURGERY

## 2025-07-17 PROCEDURE — 1160F RVW MEDS BY RX/DR IN RCRD: CPT | Performed by: ORTHOPAEDIC SURGERY

## 2025-07-17 PROCEDURE — 73562 X-RAY EXAM OF KNEE 3: CPT | Mod: RIGHT SIDE | Performed by: RADIOLOGY

## 2025-07-17 PROCEDURE — 20610 DRAIN/INJ JOINT/BURSA W/O US: CPT | Mod: RT | Performed by: ORTHOPAEDIC SURGERY

## 2025-07-17 RX ORDER — METHYLPREDNISOLONE ACETATE 40 MG/ML
40 INJECTION, SUSPENSION INTRA-ARTICULAR; INTRALESIONAL; INTRAMUSCULAR; SOFT TISSUE
Status: COMPLETED | OUTPATIENT
Start: 2025-07-17 | End: 2025-07-17

## 2025-07-17 RX ORDER — LIDOCAINE HYDROCHLORIDE 20 MG/ML
2 INJECTION, SOLUTION INFILTRATION; PERINEURAL
Status: COMPLETED | OUTPATIENT
Start: 2025-07-17 | End: 2025-07-17

## 2025-07-17 RX ADMIN — LIDOCAINE HYDROCHLORIDE 2 ML: 20 INJECTION, SOLUTION INFILTRATION; PERINEURAL at 10:17

## 2025-07-17 RX ADMIN — METHYLPREDNISOLONE ACETATE 40 MG: 40 INJECTION, SUSPENSION INTRALESIONAL; INTRAMUSCULAR; INTRASYNOVIAL; SOFT TISSUE at 10:17

## 2025-07-17 NOTE — PROGRESS NOTES
Pain in back of knee started couple weeks ago and is gotten better has had no treatments she has had injections in her other knee in the past  No fevers or chills  No injury  Constitutional: Well-developed well-nourished   Eyes: Sclerae anicteric, pupils equal and round  HENT: Normocephalic atraumatic  Cardiovascular: Pulses full, regular rate and rhythm  Respiratory: Breathing not labored, no wheezing  Integumentary: Skin intact, no lesions or rashes  Neurological: Sensation intact, no gross strength deficits, reflexes equal  Psychiatric: Alert oriented and appropriate  Hematologic/lymphatic: No lymphadenopathy  Right knee: Small effusion some popliteal fullness but no distinct cyst mobility full no gross instability x-rays show moderate tricompartment arthritis impression knee arthritis discussed natural history injected knee today  L Inj/Asp: R knee on 7/17/2025 10:17 AM  Indications: pain  Details: 21 G needle, anteromedial approach  Medications: 40 mg methylPREDNISolone acetate 40 mg/mL; 2 mL lidocaine 20 mg/mL (2 %)

## 2025-07-18 DIAGNOSIS — N39.0 RECURRENT URINARY TRACT INFECTION: ICD-10-CM

## 2025-07-21 DIAGNOSIS — Z12.11 SPECIAL SCREENING FOR MALIGNANT NEOPLASMS, COLON: ICD-10-CM

## 2025-07-21 RX ORDER — POLYETHYLENE GLYCOL 3350, SODIUM SULFATE ANHYDROUS, SODIUM BICARBONATE, SODIUM CHLORIDE, POTASSIUM CHLORIDE 236; 22.74; 6.74; 5.86; 2.97 G/4L; G/4L; G/4L; G/4L; G/4L
POWDER, FOR SOLUTION ORAL
Qty: 4000 ML | Refills: 0 | Status: SHIPPED | OUTPATIENT
Start: 2025-07-21

## 2025-07-25 DIAGNOSIS — N39.0 RECURRENT URINARY TRACT INFECTION: ICD-10-CM

## 2025-08-01 DIAGNOSIS — N39.0 RECURRENT URINARY TRACT INFECTION: ICD-10-CM

## 2025-08-08 ENCOUNTER — APPOINTMENT (OUTPATIENT)
Dept: GASTROENTEROLOGY | Facility: EXTERNAL LOCATION | Age: 80
End: 2025-08-08
Payer: MEDICARE

## 2025-08-08 DIAGNOSIS — K62.5 RECTAL BLEEDING: Primary | ICD-10-CM

## 2025-08-08 DIAGNOSIS — D12.3 BENIGN NEOPLASM OF TRANSVERSE COLON: ICD-10-CM

## 2025-08-08 DIAGNOSIS — K52.9 DIARRHEAL DISEASE: ICD-10-CM

## 2025-08-08 DIAGNOSIS — N39.0 RECURRENT URINARY TRACT INFECTION: ICD-10-CM

## 2025-08-08 PROCEDURE — 45380 COLONOSCOPY AND BIOPSY: CPT | Performed by: INTERNAL MEDICINE

## 2025-08-08 PROCEDURE — 45385 COLONOSCOPY W/LESION REMOVAL: CPT | Performed by: INTERNAL MEDICINE

## 2025-08-08 NOTE — PROGRESS NOTES
Colonoscopy performed today 8/8/2025 at the [facility] ([facility]).  See procedure report(s) under Media tab.

## 2025-08-15 DIAGNOSIS — N39.0 RECURRENT URINARY TRACT INFECTION: ICD-10-CM

## 2025-08-19 ENCOUNTER — HOSPITAL ENCOUNTER (OUTPATIENT)
Facility: HOSPITAL | Age: 80
Setting detail: OBSERVATION
Discharge: HOME | End: 2025-08-20
Attending: EMERGENCY MEDICINE | Admitting: STUDENT IN AN ORGANIZED HEALTH CARE EDUCATION/TRAINING PROGRAM
Payer: MEDICARE

## 2025-08-19 ENCOUNTER — APPOINTMENT (OUTPATIENT)
Dept: RADIOLOGY | Facility: HOSPITAL | Age: 80
End: 2025-08-19
Payer: MEDICARE

## 2025-08-19 ENCOUNTER — DOCUMENTATION (OUTPATIENT)
Dept: GASTROENTEROLOGY | Facility: CLINIC | Age: 80
End: 2025-08-19
Payer: MEDICARE

## 2025-08-19 ENCOUNTER — APPOINTMENT (OUTPATIENT)
Dept: CARDIOLOGY | Facility: HOSPITAL | Age: 80
End: 2025-08-19
Payer: MEDICARE

## 2025-08-19 ENCOUNTER — TELEPHONE (OUTPATIENT)
Dept: GASTROENTEROLOGY | Facility: CLINIC | Age: 80
End: 2025-08-19

## 2025-08-19 DIAGNOSIS — N12 PYELONEPHRITIS: Primary | ICD-10-CM

## 2025-08-19 DIAGNOSIS — N30.00 ACUTE CYSTITIS WITHOUT HEMATURIA: ICD-10-CM

## 2025-08-19 DIAGNOSIS — K51.20 ULCERATIVE PROCTITIS WITHOUT COMPLICATION (MULTI): Primary | ICD-10-CM

## 2025-08-19 LAB
ALBUMIN SERPL BCP-MCNC: 4.4 G/DL (ref 3.4–5)
ALP SERPL-CCNC: 68 U/L (ref 33–136)
ALT SERPL W P-5'-P-CCNC: 14 U/L (ref 7–45)
ANION GAP SERPL CALC-SCNC: 12 MMOL/L (ref 10–20)
APPEARANCE UR: CLEAR
AST SERPL W P-5'-P-CCNC: 17 U/L (ref 9–39)
BACTERIA #/AREA URNS AUTO: ABNORMAL /HPF
BASOPHILS # BLD AUTO: 0.09 X10*3/UL (ref 0–0.1)
BASOPHILS NFR BLD AUTO: 1 %
BILIRUB SERPL-MCNC: 0.4 MG/DL (ref 0–1.2)
BILIRUB UR STRIP.AUTO-MCNC: NEGATIVE MG/DL
BNP SERPL-MCNC: 139 PG/ML (ref 0–99)
BUN SERPL-MCNC: 19 MG/DL (ref 6–23)
CALCIUM SERPL-MCNC: 10.2 MG/DL (ref 8.6–10.3)
CARDIAC TROPONIN I PNL SERPL HS: 6 NG/L (ref 0–13)
CARDIAC TROPONIN I PNL SERPL HS: 7 NG/L (ref 0–13)
CHLORIDE SERPL-SCNC: 104 MMOL/L (ref 98–107)
CO2 SERPL-SCNC: 28 MMOL/L (ref 21–32)
COLOR UR: YELLOW
CREAT SERPL-MCNC: 0.95 MG/DL (ref 0.5–1.05)
EGFRCR SERPLBLD CKD-EPI 2021: 61 ML/MIN/1.73M*2
EOSINOPHIL # BLD AUTO: 0.26 X10*3/UL (ref 0–0.4)
EOSINOPHIL NFR BLD AUTO: 3 %
ERYTHROCYTE [DISTWIDTH] IN BLOOD BY AUTOMATED COUNT: 13.2 % (ref 11.5–14.5)
GLUCOSE SERPL-MCNC: 195 MG/DL (ref 74–99)
GLUCOSE UR STRIP.AUTO-MCNC: ABNORMAL MG/DL
HCT VFR BLD AUTO: 41.8 % (ref 36–46)
HGB BLD-MCNC: 13.3 G/DL (ref 12–16)
IMM GRANULOCYTES # BLD AUTO: 0.03 X10*3/UL (ref 0–0.5)
IMM GRANULOCYTES NFR BLD AUTO: 0.3 % (ref 0–0.9)
KETONES UR STRIP.AUTO-MCNC: NEGATIVE MG/DL
LACTATE SERPL-SCNC: 1.4 MMOL/L (ref 0.4–2)
LEUKOCYTE ESTERASE UR QL STRIP.AUTO: ABNORMAL
LIPASE SERPL-CCNC: 17 U/L (ref 9–82)
LYMPHOCYTES # BLD AUTO: 3.25 X10*3/UL (ref 0.8–3)
LYMPHOCYTES NFR BLD AUTO: 37.1 %
MAGNESIUM SERPL-MCNC: 2.25 MG/DL (ref 1.6–2.4)
MCH RBC QN AUTO: 28.7 PG (ref 26–34)
MCHC RBC AUTO-ENTMCNC: 31.8 G/DL (ref 32–36)
MCV RBC AUTO: 90 FL (ref 80–100)
MONOCYTES # BLD AUTO: 0.53 X10*3/UL (ref 0.05–0.8)
MONOCYTES NFR BLD AUTO: 6.1 %
MUCOUS THREADS #/AREA URNS AUTO: ABNORMAL /LPF
NEUTROPHILS # BLD AUTO: 4.6 X10*3/UL (ref 1.6–5.5)
NEUTROPHILS NFR BLD AUTO: 52.5 %
NITRITE UR QL STRIP.AUTO: NEGATIVE
NRBC BLD-RTO: 0 /100 WBCS (ref 0–0)
PH UR STRIP.AUTO: 5.5 [PH]
PHOSPHATE SERPL-MCNC: 3.9 MG/DL (ref 2.5–4.9)
PLATELET # BLD AUTO: 321 X10*3/UL (ref 150–450)
POTASSIUM SERPL-SCNC: 4.3 MMOL/L (ref 3.5–5.3)
PROT SERPL-MCNC: 7.4 G/DL (ref 6.4–8.2)
PROT UR STRIP.AUTO-MCNC: NEGATIVE MG/DL
RBC # BLD AUTO: 4.64 X10*6/UL (ref 4–5.2)
RBC # UR STRIP.AUTO: NEGATIVE MG/DL
RBC #/AREA URNS AUTO: ABNORMAL /HPF
SODIUM SERPL-SCNC: 140 MMOL/L (ref 136–145)
SP GR UR STRIP.AUTO: 1.01
SQUAMOUS #/AREA URNS AUTO: ABNORMAL /HPF
TRANS CELLS #/AREA UR COMP ASSIST: ABNORMAL /HPF
UROBILINOGEN UR STRIP.AUTO-MCNC: NORMAL MG/DL
WBC # BLD AUTO: 8.8 X10*3/UL (ref 4.4–11.3)
WBC #/AREA URNS AUTO: >50 /HPF

## 2025-08-19 PROCEDURE — 96375 TX/PRO/DX INJ NEW DRUG ADDON: CPT | Mod: 59

## 2025-08-19 PROCEDURE — 99285 EMERGENCY DEPT VISIT HI MDM: CPT | Mod: 25 | Performed by: EMERGENCY MEDICINE

## 2025-08-19 PROCEDURE — 81001 URINALYSIS AUTO W/SCOPE: CPT

## 2025-08-19 PROCEDURE — 96365 THER/PROPH/DIAG IV INF INIT: CPT | Mod: 59

## 2025-08-19 PROCEDURE — 83690 ASSAY OF LIPASE: CPT

## 2025-08-19 PROCEDURE — 85025 COMPLETE CBC W/AUTO DIFF WBC: CPT

## 2025-08-19 PROCEDURE — 36415 COLL VENOUS BLD VENIPUNCTURE: CPT

## 2025-08-19 PROCEDURE — 71046 X-RAY EXAM CHEST 2 VIEWS: CPT

## 2025-08-19 PROCEDURE — 84484 ASSAY OF TROPONIN QUANT: CPT

## 2025-08-19 PROCEDURE — 84100 ASSAY OF PHOSPHORUS: CPT

## 2025-08-19 PROCEDURE — 86140 C-REACTIVE PROTEIN: CPT | Performed by: NURSE PRACTITIONER

## 2025-08-19 PROCEDURE — 83880 ASSAY OF NATRIURETIC PEPTIDE: CPT

## 2025-08-19 PROCEDURE — 80053 COMPREHEN METABOLIC PANEL: CPT

## 2025-08-19 PROCEDURE — 74177 CT ABD & PELVIS W/CONTRAST: CPT | Performed by: RADIOLOGY

## 2025-08-19 PROCEDURE — 2500000001 HC RX 250 WO HCPCS SELF ADMINISTERED DRUGS (ALT 637 FOR MEDICARE OP): Performed by: EMERGENCY MEDICINE

## 2025-08-19 PROCEDURE — 93005 ELECTROCARDIOGRAM TRACING: CPT

## 2025-08-19 PROCEDURE — 2550000001 HC RX 255 CONTRASTS

## 2025-08-19 PROCEDURE — 87086 URINE CULTURE/COLONY COUNT: CPT | Mod: AHULAB

## 2025-08-19 PROCEDURE — 83735 ASSAY OF MAGNESIUM: CPT

## 2025-08-19 PROCEDURE — 74177 CT ABD & PELVIS W/CONTRAST: CPT

## 2025-08-19 PROCEDURE — 71046 X-RAY EXAM CHEST 2 VIEWS: CPT | Performed by: STUDENT IN AN ORGANIZED HEALTH CARE EDUCATION/TRAINING PROGRAM

## 2025-08-19 PROCEDURE — 83605 ASSAY OF LACTIC ACID: CPT

## 2025-08-19 PROCEDURE — 2500000004 HC RX 250 GENERAL PHARMACY W/ HCPCS (ALT 636 FOR OP/ED): Performed by: EMERGENCY MEDICINE

## 2025-08-19 RX ORDER — CEFTRIAXONE 1 G/50ML
1 INJECTION, SOLUTION INTRAVENOUS ONCE
Status: COMPLETED | OUTPATIENT
Start: 2025-08-19 | End: 2025-08-19

## 2025-08-19 RX ORDER — ACETAMINOPHEN 325 MG/1
975 TABLET ORAL ONCE
Status: COMPLETED | OUTPATIENT
Start: 2025-08-19 | End: 2025-08-19

## 2025-08-19 RX ORDER — KETOROLAC TROMETHAMINE 30 MG/ML
15 INJECTION, SOLUTION INTRAMUSCULAR; INTRAVENOUS ONCE
Status: COMPLETED | OUTPATIENT
Start: 2025-08-19 | End: 2025-08-19

## 2025-08-19 RX ORDER — MESALAMINE 1000 MG/1
1000 SUPPOSITORY RECTAL NIGHTLY
Qty: 30 SUPPOSITORY | Refills: 2 | Status: SHIPPED | OUTPATIENT
Start: 2025-08-19 | End: 2025-11-17

## 2025-08-19 RX ADMIN — ACETAMINOPHEN 975 MG: 325 TABLET ORAL at 22:58

## 2025-08-19 RX ADMIN — HYDROMORPHONE HYDROCHLORIDE 0.5 MG: 1 INJECTION, SOLUTION INTRAMUSCULAR; INTRAVENOUS; SUBCUTANEOUS at 22:58

## 2025-08-19 RX ADMIN — CEFTRIAXONE 1 G: 1 INJECTION, SOLUTION INTRAVENOUS at 22:57

## 2025-08-19 RX ADMIN — KETOROLAC TROMETHAMINE 15 MG: 30 INJECTION, SOLUTION INTRAMUSCULAR at 22:58

## 2025-08-19 RX ADMIN — IOHEXOL 75 ML: 350 INJECTION, SOLUTION INTRAVENOUS at 21:42

## 2025-08-19 RX ADMIN — SODIUM CHLORIDE, SODIUM LACTATE, POTASSIUM CHLORIDE, AND CALCIUM CHLORIDE 1000 ML: .6; .31; .03; .02 INJECTION, SOLUTION INTRAVENOUS at 22:57

## 2025-08-19 ASSESSMENT — PAIN SCALES - GENERAL
PAINLEVEL_OUTOF10: 0 - NO PAIN
PAINLEVEL_OUTOF10: 9

## 2025-08-19 ASSESSMENT — PAIN DESCRIPTION - ORIENTATION: ORIENTATION: LEFT

## 2025-08-19 ASSESSMENT — PAIN - FUNCTIONAL ASSESSMENT: PAIN_FUNCTIONAL_ASSESSMENT: 0-10

## 2025-08-19 ASSESSMENT — PAIN DESCRIPTION - DESCRIPTORS: DESCRIPTORS: STABBING

## 2025-08-19 ASSESSMENT — PAIN DESCRIPTION - LOCATION: LOCATION: RIB CAGE

## 2025-08-20 ENCOUNTER — PHARMACY VISIT (OUTPATIENT)
Dept: PHARMACY | Facility: CLINIC | Age: 80
End: 2025-08-20
Payer: COMMERCIAL

## 2025-08-20 VITALS
SYSTOLIC BLOOD PRESSURE: 132 MMHG | TEMPERATURE: 97 F | HEIGHT: 64 IN | OXYGEN SATURATION: 97 % | HEART RATE: 57 BPM | RESPIRATION RATE: 17 BRPM | WEIGHT: 157 LBS | DIASTOLIC BLOOD PRESSURE: 62 MMHG | BODY MASS INDEX: 26.8 KG/M2

## 2025-08-20 DIAGNOSIS — I10 PRIMARY HYPERTENSION: ICD-10-CM

## 2025-08-20 PROBLEM — N39.0 UTI (URINARY TRACT INFECTION): Status: ACTIVE | Noted: 2025-08-20

## 2025-08-20 LAB
ANION GAP SERPL CALC-SCNC: 10 MMOL/L (ref 10–20)
BACTERIA UR CULT: NORMAL
BUN SERPL-MCNC: 20 MG/DL (ref 6–23)
CALCIUM SERPL-MCNC: 9.4 MG/DL (ref 8.6–10.3)
CHLORIDE SERPL-SCNC: 105 MMOL/L (ref 98–107)
CO2 SERPL-SCNC: 30 MMOL/L (ref 21–32)
CREAT SERPL-MCNC: 1.07 MG/DL (ref 0.5–1.05)
CRP SERPL-MCNC: 0.27 MG/DL
EGFRCR SERPLBLD CKD-EPI 2021: 53 ML/MIN/1.73M*2
ERYTHROCYTE [DISTWIDTH] IN BLOOD BY AUTOMATED COUNT: 13.2 % (ref 11.5–14.5)
EST. AVERAGE GLUCOSE BLD GHB EST-MCNC: 203 MG/DL
GLUCOSE SERPL-MCNC: 126 MG/DL (ref 74–99)
HBA1C MFR BLD: 8.7 % (ref ?–5.7)
HCT VFR BLD AUTO: 34 % (ref 36–46)
HGB BLD-MCNC: 11 G/DL (ref 12–16)
MCH RBC QN AUTO: 28.4 PG (ref 26–34)
MCHC RBC AUTO-ENTMCNC: 32.4 G/DL (ref 32–36)
MCV RBC AUTO: 88 FL (ref 80–100)
NRBC BLD-RTO: 0 /100 WBCS (ref 0–0)
PLATELET # BLD AUTO: 271 X10*3/UL (ref 150–450)
POTASSIUM SERPL-SCNC: 4.7 MMOL/L (ref 3.5–5.3)
RBC # BLD AUTO: 3.87 X10*6/UL (ref 4–5.2)
SODIUM SERPL-SCNC: 140 MMOL/L (ref 136–145)
WBC # BLD AUTO: 8.3 X10*3/UL (ref 4.4–11.3)

## 2025-08-20 PROCEDURE — RXMED WILLOW AMBULATORY MEDICATION CHARGE

## 2025-08-20 PROCEDURE — 36415 COLL VENOUS BLD VENIPUNCTURE: CPT | Performed by: NURSE PRACTITIONER

## 2025-08-20 PROCEDURE — 2500000002 HC RX 250 W HCPCS SELF ADMINISTERED DRUGS (ALT 637 FOR MEDICARE OP, ALT 636 FOR OP/ED): Performed by: NURSE PRACTITIONER

## 2025-08-20 PROCEDURE — G0378 HOSPITAL OBSERVATION PER HR: HCPCS

## 2025-08-20 PROCEDURE — 2500000001 HC RX 250 WO HCPCS SELF ADMINISTERED DRUGS (ALT 637 FOR MEDICARE OP): Performed by: INTERNAL MEDICINE

## 2025-08-20 PROCEDURE — 2500000004 HC RX 250 GENERAL PHARMACY W/ HCPCS (ALT 636 FOR OP/ED): Performed by: NURSE PRACTITIONER

## 2025-08-20 PROCEDURE — 99222 1ST HOSP IP/OBS MODERATE 55: CPT | Performed by: NURSE PRACTITIONER

## 2025-08-20 PROCEDURE — 85027 COMPLETE CBC AUTOMATED: CPT | Performed by: NURSE PRACTITIONER

## 2025-08-20 PROCEDURE — 80048 BASIC METABOLIC PNL TOTAL CA: CPT | Performed by: NURSE PRACTITIONER

## 2025-08-20 PROCEDURE — 83036 HEMOGLOBIN GLYCOSYLATED A1C: CPT | Mod: AHULAB | Performed by: STUDENT IN AN ORGANIZED HEALTH CARE EDUCATION/TRAINING PROGRAM

## 2025-08-20 PROCEDURE — 96375 TX/PRO/DX INJ NEW DRUG ADDON: CPT | Mod: 59

## 2025-08-20 PROCEDURE — 2500000001 HC RX 250 WO HCPCS SELF ADMINISTERED DRUGS (ALT 637 FOR MEDICARE OP): Performed by: NURSE PRACTITIONER

## 2025-08-20 RX ORDER — MESALAMINE 1000 MG/1
1000 SUPPOSITORY RECTAL NIGHTLY
Status: DISCONTINUED | OUTPATIENT
Start: 2025-08-20 | End: 2025-08-20 | Stop reason: HOSPADM

## 2025-08-20 RX ORDER — DICLOFENAC SODIUM 10 MG/G
4 GEL TOPICAL 3 TIMES DAILY PRN
Qty: 50 G | Refills: 0 | Status: SHIPPED | OUTPATIENT
Start: 2025-08-20

## 2025-08-20 RX ORDER — CEFTRIAXONE 1 G/50ML
1 INJECTION, SOLUTION INTRAVENOUS EVERY 24 HOURS
Status: DISCONTINUED | OUTPATIENT
Start: 2025-08-20 | End: 2025-08-20 | Stop reason: HOSPADM

## 2025-08-20 RX ORDER — DEXTROSE 50 % IN WATER (D50W) INTRAVENOUS SYRINGE
25
Status: DISCONTINUED | OUTPATIENT
Start: 2025-08-20 | End: 2025-08-20 | Stop reason: HOSPADM

## 2025-08-20 RX ORDER — DICLOFENAC SODIUM 10 MG/G
4 GEL TOPICAL 4 TIMES DAILY PRN
Status: DISCONTINUED | OUTPATIENT
Start: 2025-08-20 | End: 2025-08-20 | Stop reason: HOSPADM

## 2025-08-20 RX ORDER — CIPROFLOXACIN 250 MG/1
250 TABLET, FILM COATED ORAL 2 TIMES DAILY
Qty: 12 TABLET | Refills: 0 | Status: SHIPPED | OUTPATIENT
Start: 2025-08-20 | End: 2025-08-26

## 2025-08-20 RX ORDER — ONDANSETRON HYDROCHLORIDE 2 MG/ML
4 INJECTION, SOLUTION INTRAVENOUS EVERY 8 HOURS PRN
Status: DISCONTINUED | OUTPATIENT
Start: 2025-08-20 | End: 2025-08-20 | Stop reason: HOSPADM

## 2025-08-20 RX ORDER — ONDANSETRON 4 MG/1
4 TABLET, FILM COATED ORAL EVERY 8 HOURS PRN
Status: DISCONTINUED | OUTPATIENT
Start: 2025-08-20 | End: 2025-08-20 | Stop reason: HOSPADM

## 2025-08-20 RX ORDER — ONDANSETRON 4 MG/1
4 TABLET, ORALLY DISINTEGRATING ORAL EVERY 6 HOURS
Qty: 16 TABLET | Refills: 0 | Status: SHIPPED | OUTPATIENT
Start: 2025-08-20 | End: 2025-08-24

## 2025-08-20 RX ORDER — AMOXICILLIN 250 MG
1 CAPSULE ORAL 2 TIMES DAILY
Status: DISCONTINUED | OUTPATIENT
Start: 2025-08-20 | End: 2025-08-20 | Stop reason: HOSPADM

## 2025-08-20 RX ORDER — OXYCODONE HYDROCHLORIDE 5 MG/1
5 TABLET ORAL EVERY 8 HOURS
Qty: 9 TABLET | Refills: 0 | Status: SHIPPED | OUTPATIENT
Start: 2025-08-20 | End: 2025-08-23

## 2025-08-20 RX ORDER — DICLOFENAC SODIUM 10 MG/G
4 GEL TOPICAL 3 TIMES DAILY PRN
Status: CANCELLED | OUTPATIENT
Start: 2025-08-20

## 2025-08-20 RX ORDER — LISINOPRIL 20 MG/1
40 TABLET ORAL DAILY
Status: DISCONTINUED | OUTPATIENT
Start: 2025-08-20 | End: 2025-08-20 | Stop reason: HOSPADM

## 2025-08-20 RX ORDER — PANTOPRAZOLE SODIUM 40 MG/10ML
40 INJECTION, POWDER, LYOPHILIZED, FOR SOLUTION INTRAVENOUS
Status: DISCONTINUED | OUTPATIENT
Start: 2025-08-20 | End: 2025-08-20

## 2025-08-20 RX ORDER — ACETAMINOPHEN 160 MG/5ML
650 SOLUTION ORAL EVERY 4 HOURS PRN
Status: DISCONTINUED | OUTPATIENT
Start: 2025-08-20 | End: 2025-08-20 | Stop reason: HOSPADM

## 2025-08-20 RX ORDER — DEXTROSE 50 % IN WATER (D50W) INTRAVENOUS SYRINGE
12.5
Status: DISCONTINUED | OUTPATIENT
Start: 2025-08-20 | End: 2025-08-20 | Stop reason: HOSPADM

## 2025-08-20 RX ORDER — ACETAMINOPHEN 325 MG/1
650 TABLET ORAL EVERY 4 HOURS PRN
Status: DISCONTINUED | OUTPATIENT
Start: 2025-08-20 | End: 2025-08-20 | Stop reason: HOSPADM

## 2025-08-20 RX ORDER — POLYETHYLENE GLYCOL 3350 17 G/17G
17 POWDER, FOR SOLUTION ORAL DAILY PRN
Status: DISCONTINUED | OUTPATIENT
Start: 2025-08-20 | End: 2025-08-20 | Stop reason: HOSPADM

## 2025-08-20 RX ORDER — PANTOPRAZOLE SODIUM 40 MG/1
40 TABLET, DELAYED RELEASE ORAL
Status: DISCONTINUED | OUTPATIENT
Start: 2025-08-20 | End: 2025-08-20 | Stop reason: HOSPADM

## 2025-08-20 RX ORDER — AMOXICILLIN 250 MG
1 CAPSULE ORAL 2 TIMES DAILY
Qty: 60 TABLET | Refills: 0 | Status: SHIPPED | OUTPATIENT
Start: 2025-08-20

## 2025-08-20 RX ORDER — ASPIRIN 81 MG/1
81 TABLET ORAL DAILY
Status: DISCONTINUED | OUTPATIENT
Start: 2025-08-20 | End: 2025-08-20 | Stop reason: HOSPADM

## 2025-08-20 RX ORDER — OXYCODONE HYDROCHLORIDE 5 MG/1
5 TABLET ORAL EVERY 6 HOURS PRN
Refills: 0 | Status: DISCONTINUED | OUTPATIENT
Start: 2025-08-20 | End: 2025-08-20 | Stop reason: HOSPADM

## 2025-08-20 RX ORDER — ROSUVASTATIN CALCIUM 20 MG/1
20 TABLET, COATED ORAL DAILY
Status: DISCONTINUED | OUTPATIENT
Start: 2025-08-20 | End: 2025-08-20 | Stop reason: HOSPADM

## 2025-08-20 RX ORDER — ACETAMINOPHEN 650 MG/1
650 SUPPOSITORY RECTAL EVERY 4 HOURS PRN
Status: DISCONTINUED | OUTPATIENT
Start: 2025-08-20 | End: 2025-08-20 | Stop reason: HOSPADM

## 2025-08-20 RX ADMIN — ONDANSETRON 4 MG: 2 INJECTION, SOLUTION INTRAMUSCULAR; INTRAVENOUS at 10:48

## 2025-08-20 RX ADMIN — EMPAGLIFLOZIN 10 MG: 10 TABLET, FILM COATED ORAL at 08:14

## 2025-08-20 RX ADMIN — SENNOSIDES, DOCUSATE SODIUM 1 TABLET: 50; 8.6 TABLET, FILM COATED ORAL at 13:19

## 2025-08-20 RX ADMIN — BISOPROLOL FUMARATE: 5 TABLET ORAL at 08:13

## 2025-08-20 RX ADMIN — OXYCODONE HYDROCHLORIDE 5 MG: 5 TABLET ORAL at 08:20

## 2025-08-20 RX ADMIN — ASPIRIN 81 MG: 81 TABLET, COATED ORAL at 08:14

## 2025-08-20 RX ADMIN — OXYCODONE HYDROCHLORIDE 5 MG: 5 TABLET ORAL at 02:36

## 2025-08-20 RX ADMIN — LISINOPRIL 40 MG: 20 TABLET ORAL at 08:14

## 2025-08-20 RX ADMIN — ROSUVASTATIN 20 MG: 20 TABLET, FILM COATED ORAL at 08:14

## 2025-08-20 RX ADMIN — ACETAMINOPHEN 650 MG: 325 TABLET ORAL at 13:19

## 2025-08-20 SDOH — SOCIAL STABILITY: SOCIAL INSECURITY
WITHIN THE LAST YEAR, HAVE YOU BEEN RAPED OR FORCED TO HAVE ANY KIND OF SEXUAL ACTIVITY BY YOUR PARTNER OR EX-PARTNER?: NO

## 2025-08-20 SDOH — ECONOMIC STABILITY: HOUSING INSECURITY: IN THE PAST 12 MONTHS, HOW MANY TIMES HAVE YOU MOVED WHERE YOU WERE LIVING?: 0

## 2025-08-20 SDOH — SOCIAL STABILITY: SOCIAL INSECURITY: WITHIN THE LAST YEAR, HAVE YOU BEEN HUMILIATED OR EMOTIONALLY ABUSED IN OTHER WAYS BY YOUR PARTNER OR EX-PARTNER?: NO

## 2025-08-20 SDOH — SOCIAL STABILITY: SOCIAL INSECURITY: DO YOU FEEL ANYONE HAS EXPLOITED OR TAKEN ADVANTAGE OF YOU FINANCIALLY OR OF YOUR PERSONAL PROPERTY?: NO

## 2025-08-20 SDOH — HEALTH STABILITY: MENTAL HEALTH
DO YOU FEEL STRESS - TENSE, RESTLESS, NERVOUS, OR ANXIOUS, OR UNABLE TO SLEEP AT NIGHT BECAUSE YOUR MIND IS TROUBLED ALL THE TIME - THESE DAYS?: NOT AT ALL

## 2025-08-20 SDOH — SOCIAL STABILITY: SOCIAL INSECURITY: HAVE YOU HAD ANY THOUGHTS OF HARMING ANYONE ELSE?: NO

## 2025-08-20 SDOH — HEALTH STABILITY: MENTAL HEALTH: HOW OFTEN DO YOU HAVE SIX OR MORE DRINKS ON ONE OCCASION?: NEVER

## 2025-08-20 SDOH — SOCIAL STABILITY: SOCIAL INSECURITY: WITHIN THE LAST YEAR, HAVE YOU BEEN AFRAID OF YOUR PARTNER OR EX-PARTNER?: NO

## 2025-08-20 SDOH — ECONOMIC STABILITY: HOUSING INSECURITY: AT ANY TIME IN THE PAST 12 MONTHS, WERE YOU HOMELESS OR LIVING IN A SHELTER (INCLUDING NOW)?: NO

## 2025-08-20 SDOH — HEALTH STABILITY: MENTAL HEALTH: HOW MANY DRINKS CONTAINING ALCOHOL DO YOU HAVE ON A TYPICAL DAY WHEN YOU ARE DRINKING?: 1 OR 2

## 2025-08-20 SDOH — HEALTH STABILITY: MENTAL HEALTH: HOW OFTEN DO YOU HAVE A DRINK CONTAINING ALCOHOL?: MONTHLY OR LESS

## 2025-08-20 SDOH — ECONOMIC STABILITY: FOOD INSECURITY: WITHIN THE PAST 12 MONTHS, YOU WORRIED THAT YOUR FOOD WOULD RUN OUT BEFORE YOU GOT THE MONEY TO BUY MORE.: NEVER TRUE

## 2025-08-20 SDOH — SOCIAL STABILITY: SOCIAL INSECURITY: ARE YOU OR HAVE YOU BEEN THREATENED OR ABUSED PHYSICALLY, EMOTIONALLY, OR SEXUALLY BY ANYONE?: NO

## 2025-08-20 SDOH — SOCIAL STABILITY: SOCIAL INSECURITY: WERE YOU ABLE TO COMPLETE ALL THE BEHAVIORAL HEALTH SCREENINGS?: YES

## 2025-08-20 SDOH — ECONOMIC STABILITY: INCOME INSECURITY: IN THE PAST 12 MONTHS HAS THE ELECTRIC, GAS, OIL, OR WATER COMPANY THREATENED TO SHUT OFF SERVICES IN YOUR HOME?: NO

## 2025-08-20 SDOH — ECONOMIC STABILITY: FOOD INSECURITY: WITHIN THE PAST 12 MONTHS, THE FOOD YOU BOUGHT JUST DIDN'T LAST AND YOU DIDN'T HAVE MONEY TO GET MORE.: NEVER TRUE

## 2025-08-20 SDOH — SOCIAL STABILITY: SOCIAL INSECURITY: ARE THERE ANY APPARENT SIGNS OF INJURIES/BEHAVIORS THAT COULD BE RELATED TO ABUSE/NEGLECT?: NO

## 2025-08-20 SDOH — SOCIAL STABILITY: SOCIAL INSECURITY: ABUSE: ADULT

## 2025-08-20 SDOH — ECONOMIC STABILITY: TRANSPORTATION INSECURITY: IN THE PAST 12 MONTHS, HAS LACK OF TRANSPORTATION KEPT YOU FROM MEDICAL APPOINTMENTS OR FROM GETTING MEDICATIONS?: NO

## 2025-08-20 SDOH — ECONOMIC STABILITY: HOUSING INSECURITY: IN THE LAST 12 MONTHS, WAS THERE A TIME WHEN YOU WERE NOT ABLE TO PAY THE MORTGAGE OR RENT ON TIME?: NO

## 2025-08-20 SDOH — SOCIAL STABILITY: SOCIAL INSECURITY: DO YOU FEEL UNSAFE GOING BACK TO THE PLACE WHERE YOU ARE LIVING?: NO

## 2025-08-20 SDOH — SOCIAL STABILITY: SOCIAL INSECURITY: HAVE YOU HAD THOUGHTS OF HARMING ANYONE ELSE?: NO

## 2025-08-20 SDOH — SOCIAL STABILITY: SOCIAL INSECURITY: HAS ANYONE EVER THREATENED TO HURT YOUR FAMILY OR YOUR PETS?: NO

## 2025-08-20 SDOH — SOCIAL STABILITY: SOCIAL INSECURITY: DOES ANYONE TRY TO KEEP YOU FROM HAVING/CONTACTING OTHER FRIENDS OR DOING THINGS OUTSIDE YOUR HOME?: NO

## 2025-08-20 ASSESSMENT — ACTIVITIES OF DAILY LIVING (ADL)
GROOMING: INDEPENDENT
LACK_OF_TRANSPORTATION: NO
BATHING: INDEPENDENT
FEEDING YOURSELF: INDEPENDENT
DRESSING YOURSELF: INDEPENDENT
HEARING - LEFT EAR: FUNCTIONAL
LACK_OF_TRANSPORTATION: NO
JUDGMENT_ADEQUATE_SAFELY_COMPLETE_DAILY_ACTIVITIES: YES
TOILETING: INDEPENDENT
ADEQUATE_TO_COMPLETE_ADL: YES
HEARING - RIGHT EAR: FUNCTIONAL
PATIENT'S MEMORY ADEQUATE TO SAFELY COMPLETE DAILY ACTIVITIES?: YES
LACK_OF_TRANSPORTATION: NO
WALKS IN HOME: INDEPENDENT

## 2025-08-20 ASSESSMENT — COGNITIVE AND FUNCTIONAL STATUS - GENERAL
MOBILITY SCORE: 24
MOBILITY SCORE: 24
DAILY ACTIVITIY SCORE: 24
DAILY ACTIVITIY SCORE: 24
PATIENT BASELINE BEDBOUND: NO

## 2025-08-20 ASSESSMENT — LIFESTYLE VARIABLES
AUDIT-C TOTAL SCORE: 1
SKIP TO QUESTIONS 9-10: 1

## 2025-08-20 ASSESSMENT — PAIN DESCRIPTION - DESCRIPTORS
DESCRIPTORS: SHARP;STABBING
DESCRIPTORS: SHARP;STABBING

## 2025-08-20 ASSESSMENT — PAIN SCALES - GENERAL
PAINLEVEL_OUTOF10: 9
PAINLEVEL_OUTOF10: 0 - NO PAIN
PAINLEVEL_OUTOF10: 1
PAINLEVEL_OUTOF10: 10 - WORST POSSIBLE PAIN
PAINLEVEL_OUTOF10: 4

## 2025-08-20 ASSESSMENT — PAIN - FUNCTIONAL ASSESSMENT
PAIN_FUNCTIONAL_ASSESSMENT: 0-10

## 2025-08-20 ASSESSMENT — PAIN DESCRIPTION - ORIENTATION
ORIENTATION: LEFT
ORIENTATION: LEFT

## 2025-08-20 ASSESSMENT — PAIN DESCRIPTION - LOCATION
LOCATION: ABDOMEN
LOCATION: ABDOMEN

## 2025-08-21 ENCOUNTER — DOCUMENTATION (OUTPATIENT)
Dept: PRIMARY CARE | Facility: CLINIC | Age: 80
End: 2025-08-21

## 2025-08-21 ENCOUNTER — OFFICE VISIT (OUTPATIENT)
Dept: PRIMARY CARE | Facility: CLINIC | Age: 80
End: 2025-08-21
Payer: MEDICARE

## 2025-08-21 VITALS
OXYGEN SATURATION: 95 % | SYSTOLIC BLOOD PRESSURE: 120 MMHG | HEIGHT: 64 IN | DIASTOLIC BLOOD PRESSURE: 70 MMHG | BODY MASS INDEX: 26.8 KG/M2 | TEMPERATURE: 98.3 F | HEART RATE: 58 BPM | WEIGHT: 157 LBS

## 2025-08-21 DIAGNOSIS — K50.119 CROHN'S COLITIS, UNSPECIFIED COMPLICATION (MULTI): ICD-10-CM

## 2025-08-21 DIAGNOSIS — N30.00 ACUTE CYSTITIS WITHOUT HEMATURIA: Primary | ICD-10-CM

## 2025-08-21 PROCEDURE — 1160F RVW MEDS BY RX/DR IN RCRD: CPT | Performed by: INTERNAL MEDICINE

## 2025-08-21 PROCEDURE — 3074F SYST BP LT 130 MM HG: CPT | Performed by: INTERNAL MEDICINE

## 2025-08-21 PROCEDURE — 99213 OFFICE O/P EST LOW 20 MIN: CPT | Performed by: INTERNAL MEDICINE

## 2025-08-21 PROCEDURE — 3078F DIAST BP <80 MM HG: CPT | Performed by: INTERNAL MEDICINE

## 2025-08-21 PROCEDURE — 1159F MED LIST DOCD IN RCRD: CPT | Performed by: INTERNAL MEDICINE

## 2025-08-21 RX ORDER — AMLODIPINE BESYLATE 2.5 MG/1
2.5 TABLET ORAL DAILY
Qty: 90 TABLET | Refills: 0 | Status: SHIPPED | OUTPATIENT
Start: 2025-08-21

## 2025-08-21 ASSESSMENT — ENCOUNTER SYMPTOMS
SLEEP DISTURBANCE: 0
CONFUSION: 0
FEVER: 0
ABDOMINAL PAIN: 1
FREQUENCY: 0
BACK PAIN: 0
SHORTNESS OF BREATH: 0
SEIZURES: 0
PHOTOPHOBIA: 0
HEMATURIA: 0
MYALGIAS: 0
TREMORS: 0
FATIGUE: 0
EYE PAIN: 0
ADENOPATHY: 0
HALLUCINATIONS: 0
DYSURIA: 0
NAUSEA: 0
CONSTIPATION: 0
SORE THROAT: 0
NECK PAIN: 0
VOICE CHANGE: 0
COUGH: 0
DIARRHEA: 0
UNEXPECTED WEIGHT CHANGE: 0
WEAKNESS: 0
FLANK PAIN: 0
CHEST TIGHTNESS: 0
HEADACHES: 0
FACIAL ASYMMETRY: 0
ACTIVITY CHANGE: 0
DIZZINESS: 0
NUMBNESS: 0
PALPITATIONS: 0
WHEEZING: 0
ARTHRALGIAS: 0
NERVOUS/ANXIOUS: 0
WOUND: 0
STRIDOR: 0
TROUBLE SWALLOWING: 0
APPETITE CHANGE: 0
SINUS PAIN: 0

## 2025-08-22 ENCOUNTER — PATIENT OUTREACH (OUTPATIENT)
Dept: PRIMARY CARE | Facility: CLINIC | Age: 80
End: 2025-08-22
Payer: MEDICARE

## 2025-08-22 DIAGNOSIS — N39.0 RECURRENT URINARY TRACT INFECTION: ICD-10-CM

## 2025-08-22 LAB
ATRIAL RATE: 59 BPM
P AXIS: 83 DEGREES
P OFFSET: 120 MS
P ONSET: 89 MS
PR INTERVAL: 268 MS
Q ONSET: 223 MS
QRS COUNT: 9 BEATS
QRS DURATION: 96 MS
QT INTERVAL: 392 MS
QTC CALCULATION(BAZETT): 388 MS
QTC FREDERICIA: 390 MS
R AXIS: -28 DEGREES
T AXIS: 66 DEGREES
T OFFSET: 419 MS
VENTRICULAR RATE: 59 BPM

## 2025-08-25 ENCOUNTER — TELEPHONE (OUTPATIENT)
Dept: PRIMARY CARE | Facility: CLINIC | Age: 80
End: 2025-08-25
Payer: MEDICARE

## 2025-08-26 ENCOUNTER — HOSPITAL ENCOUNTER (EMERGENCY)
Facility: HOSPITAL | Age: 80
Discharge: HOME | End: 2025-08-26
Attending: EMERGENCY MEDICINE
Payer: MEDICARE

## 2025-08-26 VITALS
TEMPERATURE: 98 F | BODY MASS INDEX: 26.8 KG/M2 | HEART RATE: 60 BPM | DIASTOLIC BLOOD PRESSURE: 55 MMHG | OXYGEN SATURATION: 100 % | WEIGHT: 157 LBS | SYSTOLIC BLOOD PRESSURE: 113 MMHG | RESPIRATION RATE: 16 BRPM | HEIGHT: 64 IN

## 2025-08-26 DIAGNOSIS — B02.9 HERPES ZOSTER WITHOUT COMPLICATION: Primary | ICD-10-CM

## 2025-08-26 PROCEDURE — 99283 EMERGENCY DEPT VISIT LOW MDM: CPT | Performed by: EMERGENCY MEDICINE

## 2025-08-26 RX ORDER — HYDROCODONE BITARTRATE AND ACETAMINOPHEN 5; 325 MG/1; MG/1
1 TABLET ORAL EVERY 4 HOURS PRN
Qty: 17 TABLET | Refills: 0 | Status: SHIPPED | OUTPATIENT
Start: 2025-08-26 | End: 2025-08-29

## 2025-08-26 RX ORDER — FAMCICLOVIR 500 MG/1
500 TABLET, FILM COATED ORAL 3 TIMES DAILY
Qty: 21 TABLET | Refills: 0 | Status: SHIPPED | OUTPATIENT
Start: 2025-08-26 | End: 2025-09-02

## 2025-08-26 ASSESSMENT — PAIN - FUNCTIONAL ASSESSMENT: PAIN_FUNCTIONAL_ASSESSMENT: 0-10

## 2025-08-26 ASSESSMENT — PAIN SCALES - GENERAL: PAINLEVEL_OUTOF10: 10 - WORST POSSIBLE PAIN

## 2025-08-27 DIAGNOSIS — N30.00 ACUTE CYSTITIS WITHOUT HEMATURIA: ICD-10-CM

## 2025-08-29 DIAGNOSIS — N39.0 RECURRENT URINARY TRACT INFECTION: ICD-10-CM

## 2025-08-31 ENCOUNTER — HOSPITAL ENCOUNTER (EMERGENCY)
Facility: HOSPITAL | Age: 80
Discharge: HOME | End: 2025-08-31
Payer: MEDICARE

## 2025-08-31 VITALS
HEIGHT: 64 IN | HEART RATE: 59 BPM | BODY MASS INDEX: 26.8 KG/M2 | TEMPERATURE: 98.8 F | RESPIRATION RATE: 16 BRPM | WEIGHT: 157 LBS | SYSTOLIC BLOOD PRESSURE: 98 MMHG | OXYGEN SATURATION: 99 % | DIASTOLIC BLOOD PRESSURE: 68 MMHG

## 2025-08-31 DIAGNOSIS — B02.9 HERPES ZOSTER WITHOUT COMPLICATION: Primary | ICD-10-CM

## 2025-08-31 DIAGNOSIS — R81 GLUCOSURIA: ICD-10-CM

## 2025-08-31 LAB
APPEARANCE UR: CLEAR
BACTERIA #/AREA URNS AUTO: ABNORMAL /HPF
BILIRUB UR STRIP.AUTO-MCNC: NEGATIVE MG/DL
COLOR UR: ABNORMAL
GLUCOSE BLD MANUAL STRIP-MCNC: 173 MG/DL (ref 74–99)
GLUCOSE UR STRIP.AUTO-MCNC: ABNORMAL MG/DL
KETONES UR STRIP.AUTO-MCNC: NEGATIVE MG/DL
LEUKOCYTE ESTERASE UR QL STRIP.AUTO: ABNORMAL
MUCOUS THREADS #/AREA URNS AUTO: ABNORMAL /LPF
NITRITE UR QL STRIP.AUTO: NEGATIVE
PH UR STRIP.AUTO: 5.5 [PH]
PROT UR STRIP.AUTO-MCNC: NEGATIVE MG/DL
RBC # UR STRIP.AUTO: NEGATIVE MG/DL
RBC #/AREA URNS AUTO: ABNORMAL /HPF
SP GR UR STRIP.AUTO: 1.02
SQUAMOUS #/AREA URNS AUTO: ABNORMAL /HPF
UROBILINOGEN UR STRIP.AUTO-MCNC: NORMAL MG/DL
WBC #/AREA URNS AUTO: ABNORMAL /HPF

## 2025-08-31 PROCEDURE — 82947 ASSAY GLUCOSE BLOOD QUANT: CPT

## 2025-08-31 PROCEDURE — 81001 URINALYSIS AUTO W/SCOPE: CPT | Performed by: NURSE PRACTITIONER

## 2025-08-31 PROCEDURE — 2500000001 HC RX 250 WO HCPCS SELF ADMINISTERED DRUGS (ALT 637 FOR MEDICARE OP): Performed by: NURSE PRACTITIONER

## 2025-08-31 PROCEDURE — 87086 URINE CULTURE/COLONY COUNT: CPT | Performed by: NURSE PRACTITIONER

## 2025-08-31 PROCEDURE — 99284 EMERGENCY DEPT VISIT MOD MDM: CPT | Performed by: NURSE PRACTITIONER

## 2025-08-31 PROCEDURE — 99283 EMERGENCY DEPT VISIT LOW MDM: CPT

## 2025-08-31 RX ORDER — PREGABALIN 75 MG/1
75 CAPSULE ORAL NIGHTLY
Qty: 7 CAPSULE | Refills: 0 | Status: SHIPPED | OUTPATIENT
Start: 2025-08-31 | End: 2025-09-04 | Stop reason: SDUPTHER

## 2025-08-31 RX ORDER — OXYCODONE AND ACETAMINOPHEN 5; 325 MG/1; MG/1
1 TABLET ORAL ONCE
Refills: 0 | Status: COMPLETED | OUTPATIENT
Start: 2025-08-31 | End: 2025-08-31

## 2025-08-31 RX ORDER — METHYLPREDNISOLONE 4 MG/1
TABLET ORAL
Qty: 21 TABLET | Refills: 0 | Status: SHIPPED | OUTPATIENT
Start: 2025-08-31 | End: 2025-09-07

## 2025-08-31 RX ADMIN — OXYCODONE HYDROCHLORIDE AND ACETAMINOPHEN 1 TABLET: 5; 325 TABLET ORAL at 09:36

## 2025-08-31 ASSESSMENT — PAIN SCALES - GENERAL: PAINLEVEL_OUTOF10: 10 - WORST POSSIBLE PAIN

## 2025-08-31 ASSESSMENT — PAIN DESCRIPTION - LOCATION: LOCATION: RIB CAGE

## 2025-08-31 ASSESSMENT — PAIN DESCRIPTION - ORIENTATION: ORIENTATION: RIGHT;LEFT

## 2025-08-31 ASSESSMENT — PAIN DESCRIPTION - DESCRIPTORS: DESCRIPTORS: DISCOMFORT

## 2025-08-31 ASSESSMENT — PAIN - FUNCTIONAL ASSESSMENT: PAIN_FUNCTIONAL_ASSESSMENT: 0-10

## 2025-08-31 ASSESSMENT — PAIN DESCRIPTION - PAIN TYPE: TYPE: ACUTE PAIN

## 2025-09-01 LAB — BACTERIA UR CULT: NORMAL

## 2025-09-02 LAB — HOLD SPECIMEN: NORMAL

## 2025-09-04 ENCOUNTER — APPOINTMENT (OUTPATIENT)
Dept: PRIMARY CARE | Facility: CLINIC | Age: 80
End: 2025-09-04
Payer: MEDICARE

## 2025-09-04 DIAGNOSIS — E11.9 TYPE 2 DIABETES MELLITUS WITHOUT COMPLICATION, WITHOUT LONG-TERM CURRENT USE OF INSULIN: Primary | ICD-10-CM

## 2025-09-04 ASSESSMENT — ACTIVITIES OF DAILY LIVING (ADL)
STIL DRIVING: YES
USING TRANSPORTATION: INDEPENDENT
PREPARING MEALS: INDEPENDENT
FEEDING YOURSELF: INDEPENDENT
GROCERY_SHOPPING: INDEPENDENT
MANAGING_FINANCES: INDEPENDENT
DRESSING: INDEPENDENT
NEEDS ASSISTANCE WITH FOOD: INDEPENDENT
BATHING: INDEPENDENT
USING TELEPHONE: INDEPENDENT
GROOMING: INDEPENDENT
JUDGMENT_ADEQUATE_SAFELY_COMPLETE_DAILY_ACTIVITIES: YES
EATING: INDEPENDENT
DOING_HOUSEWORK: INDEPENDENT
PATIENT'S MEMORY ADEQUATE TO SAFELY COMPLETE DAILY ACTIVITIES?: YES
ADEQUATE_TO_COMPLETE_ADL: YES
TOILETING: INDEPENDENT
TAKING_MEDICATION: INDEPENDENT

## 2025-09-04 ASSESSMENT — PATIENT HEALTH QUESTIONNAIRE - PHQ9
2. FEELING DOWN, DEPRESSED OR HOPELESS: NOT AT ALL
SUM OF ALL RESPONSES TO PHQ9 QUESTIONS 1 AND 2: 0
1. LITTLE INTEREST OR PLEASURE IN DOING THINGS: NOT AT ALL

## 2025-09-04 ASSESSMENT — ENCOUNTER SYMPTOMS
WEAKNESS: 0
STRIDOR: 0
SEIZURES: 0
VOICE CHANGE: 0
SPEECH DIFFICULTY: 0
APPETITE CHANGE: 0
PALPITATIONS: 0
NERVOUS/ANXIOUS: 0
BACK PAIN: 0
FLANK PAIN: 0
PHOTOPHOBIA: 0
NECK PAIN: 0
ACTIVITY CHANGE: 0
NUMBNESS: 0
CONSTIPATION: 0
HALLUCINATIONS: 0
NAUSEA: 0
TREMORS: 0
CHEST TIGHTNESS: 0
SHORTNESS OF BREATH: 0
UNEXPECTED WEIGHT CHANGE: 0
COLOR CHANGE: 1
HEADACHES: 0
ADENOPATHY: 0
DYSURIA: 0
WHEEZING: 0
CONFUSION: 0
DIARRHEA: 0
FATIGUE: 0
WOUND: 0
FREQUENCY: 0
SORE THROAT: 0
EYE PAIN: 0
DIZZINESS: 0
ARTHRALGIAS: 0
JOINT SWELLING: 0
SLEEP DISTURBANCE: 0
SINUS PAIN: 0
ABDOMINAL PAIN: 0
MYALGIAS: 0
FACIAL ASYMMETRY: 0
FEVER: 0
HEMATURIA: 0
COUGH: 0
TROUBLE SWALLOWING: 0

## 2025-09-04 ASSESSMENT — PAIN SCALES - GENERAL: PAINLEVEL_OUTOF10: 8

## 2025-09-04 ASSESSMENT — ANXIETY QUESTIONNAIRES
7. FEELING AFRAID AS IF SOMETHING AWFUL MIGHT HAPPEN: NOT AT ALL
6. BECOMING EASILY ANNOYED OR IRRITABLE: NOT AT ALL
4. TROUBLE RELAXING: NOT AT ALL
2. NOT BEING ABLE TO STOP OR CONTROL WORRYING: NOT AT ALL
5. BEING SO RESTLESS THAT IT IS HARD TO SIT STILL: NOT AT ALL
3. WORRYING TOO MUCH ABOUT DIFFERENT THINGS: NOT AT ALL
1. FEELING NERVOUS, ANXIOUS, OR ON EDGE: NOT AT ALL
GAD7 TOTAL SCORE: 0

## 2025-09-15 ENCOUNTER — APPOINTMENT (OUTPATIENT)
Dept: PRIMARY CARE | Facility: CLINIC | Age: 80
End: 2025-09-15
Payer: MEDICARE

## 2025-09-26 ENCOUNTER — APPOINTMENT (OUTPATIENT)
Dept: INFECTIOUS DISEASES | Facility: CLINIC | Age: 80
End: 2025-09-26
Payer: MEDICARE

## 2025-10-24 ENCOUNTER — APPOINTMENT (OUTPATIENT)
Dept: INFECTIOUS DISEASES | Facility: CLINIC | Age: 80
End: 2025-10-24
Payer: MEDICARE

## 2026-03-04 ENCOUNTER — APPOINTMENT (OUTPATIENT)
Dept: PRIMARY CARE | Facility: CLINIC | Age: 81
End: 2026-03-04
Payer: MEDICARE

## 2026-03-17 ENCOUNTER — APPOINTMENT (OUTPATIENT)
Dept: OPHTHALMOLOGY | Facility: CLINIC | Age: 81
End: 2026-03-17
Payer: MEDICARE